# Patient Record
Sex: MALE | Race: WHITE | NOT HISPANIC OR LATINO | Employment: OTHER | URBAN - METROPOLITAN AREA
[De-identification: names, ages, dates, MRNs, and addresses within clinical notes are randomized per-mention and may not be internally consistent; named-entity substitution may affect disease eponyms.]

---

## 2017-01-16 ENCOUNTER — OFFICE VISIT (OUTPATIENT)
Dept: FAMILY MEDICINE CLINIC | Facility: CLINIC | Age: 77
End: 2017-01-16

## 2017-01-16 VITALS
RESPIRATION RATE: 20 BRPM | DIASTOLIC BLOOD PRESSURE: 70 MMHG | SYSTOLIC BLOOD PRESSURE: 130 MMHG | WEIGHT: 193 LBS | HEIGHT: 72 IN | TEMPERATURE: 97.1 F | HEART RATE: 72 BPM | BODY MASS INDEX: 26.14 KG/M2

## 2017-01-16 DIAGNOSIS — G89.29 CHRONIC MIDLINE LOW BACK PAIN WITH SCIATICA, SCIATICA LATERALITY UNSPECIFIED: Primary | ICD-10-CM

## 2017-01-16 DIAGNOSIS — D64.9 NORMOCYTIC ANEMIA: ICD-10-CM

## 2017-01-16 DIAGNOSIS — D69.6 THROMBOCYTOPENIA (HCC): Chronic | ICD-10-CM

## 2017-01-16 DIAGNOSIS — M15.9 PRIMARY OSTEOARTHRITIS INVOLVING MULTIPLE JOINTS: ICD-10-CM

## 2017-01-16 DIAGNOSIS — M54.40 CHRONIC MIDLINE LOW BACK PAIN WITH SCIATICA, SCIATICA LATERALITY UNSPECIFIED: Primary | ICD-10-CM

## 2017-01-16 DIAGNOSIS — R79.89 ELEVATED LFTS: ICD-10-CM

## 2017-01-16 LAB
ALBUMIN SERPL-MCNC: 4.4 G/DL (ref 3.2–4.8)
ALBUMIN/GLOB SERPL: 1.8 G/DL (ref 1.5–2.5)
ALP SERPL-CCNC: 77 U/L (ref 25–100)
ALT SERPL-CCNC: 27 U/L (ref 7–40)
AST SERPL-CCNC: 30 U/L (ref 0–33)
BASOPHILS # BLD AUTO: 0 10*3/MM3 (ref 0–0.2)
BASOPHILS NFR BLD AUTO: 0 % (ref 0–1)
BILIRUB SERPL-MCNC: 1.3 MG/DL (ref 0.3–1.2)
BUN SERPL-MCNC: 17 MG/DL (ref 9–23)
BUN/CREAT SERPL: 21.3 (ref 7–25)
CALCIUM SERPL-MCNC: 9.8 MG/DL (ref 8.7–10.4)
CHLORIDE SERPL-SCNC: 105 MMOL/L (ref 99–109)
CO2 SERPL-SCNC: 31 MMOL/L (ref 20–31)
CREAT SERPL-MCNC: 0.8 MG/DL (ref 0.6–1.3)
EOSINOPHIL # BLD AUTO: 0.13 10*3/MM3 (ref 0.1–0.3)
EOSINOPHIL NFR BLD AUTO: 2.2 % (ref 0–3)
ERYTHROCYTE [DISTWIDTH] IN BLOOD BY AUTOMATED COUNT: 14.2 % (ref 11.3–14.5)
GLOBULIN SER CALC-MCNC: 2.5 GM/DL
GLUCOSE SERPL-MCNC: 79 MG/DL (ref 70–100)
HCT VFR BLD AUTO: 42.4 % (ref 38.9–50.9)
HGB BLD-MCNC: 14.2 G/DL (ref 13.1–17.5)
IMM GRANULOCYTES # BLD: 0.01 10*3/MM3 (ref 0–0.03)
IMM GRANULOCYTES NFR BLD: 0.2 % (ref 0–0.6)
LYMPHOCYTES # BLD AUTO: 1.09 10*3/MM3 (ref 0.6–4.8)
LYMPHOCYTES NFR BLD AUTO: 18.9 % (ref 24–44)
MCH RBC QN AUTO: 30.3 PG (ref 27–31)
MCHC RBC AUTO-ENTMCNC: 33.5 G/DL (ref 32–36)
MCV RBC AUTO: 90.4 FL (ref 80–99)
MONOCYTES # BLD AUTO: 0.53 10*3/MM3 (ref 0–1)
MONOCYTES NFR BLD AUTO: 9.2 % (ref 0–12)
NEUTROPHILS # BLD AUTO: 4.02 10*3/MM3 (ref 1.5–8.3)
NEUTROPHILS NFR BLD AUTO: 69.5 % (ref 41–71)
PLATELET # BLD AUTO: 129 10*3/MM3 (ref 150–450)
POTASSIUM SERPL-SCNC: 4.3 MMOL/L (ref 3.5–5.5)
PROT SERPL-MCNC: 6.9 G/DL (ref 5.7–8.2)
RBC # BLD AUTO: 4.69 10*6/MM3 (ref 4.2–5.76)
SODIUM SERPL-SCNC: 146 MMOL/L (ref 132–146)
WBC # BLD AUTO: 5.78 10*3/MM3 (ref 3.5–10.8)

## 2017-01-16 PROCEDURE — 99214 OFFICE O/P EST MOD 30 MIN: CPT | Performed by: FAMILY MEDICINE

## 2017-01-16 RX ORDER — CELECOXIB 200 MG/1
CAPSULE ORAL
Refills: 0 | COMMUNITY
Start: 2016-12-07 | End: 2021-10-06 | Stop reason: SDUPTHER

## 2017-01-16 NOTE — MR AVS SNAPSHOT
Albin Andino   1/16/2017 11:30 AM   Office Visit    Dept Phone:  [phone]   Encounter #:  [id number]    Provider:  Matthew Dunaway MD   Department:  [facility]                Your Full Care Plan              Your Updated Medication List          This list is accurate as of: 1/16/17 12:16 PM.  Always use your most recent med list.                alfuzosin 10 MG 24 hr tablet   Commonly known as:  UROXATRAL       aspirin 81 MG chewable tablet       atorvastatin 20 MG tablet   Commonly known as:  LIPITOR       benazepril 10 MG tablet   Commonly known as:  LOTENSIN       celecoxib 200 MG capsule   Commonly known as:  CeleBREX       cyanocobalamin 500 MCG tablet   Commonly known as:  VITAMIN B-12       finasteride 5 MG tablet   Commonly known as:  PROSCAR       folic acid 400 MCG tablet   Commonly known as:  FOLVITE       gabapentin 600 MG tablet   Commonly known as:  NEURONTIN   1 po q am and 2 po q pm       ICAPS AREDS 2 capsule       isosorbide mononitrate 30 MG 24 hr tablet   Commonly known as:  IMDUR       ondansetron 4 MG tablet   Commonly known as:  ZOFRAN   Take 2 tablets by mouth every 8 (eight) hours as needed for nausea or vomiting.       vitamin B-6 100 MG tablet   Commonly known as:  PYRIDOXINE       Vitamin C 500 MG capsule       Vitamin D 1000 UNITS tablet               We Performed the Following     CBC & Differential     Comprehensive Metabolic Panel       You Were Diagnosed With        Codes Comments    Chronic midline low back pain with sciatica, sciatica laterality unspecified    -  Primary ICD-10-CM: M54.40, G89.29  ICD-9-CM: 724.2, 724.3, 338.29     Primary osteoarthritis involving multiple joints     ICD-10-CM: M15.0  ICD-9-CM: 715.09     Normocytic anemia     ICD-10-CM: D64.9  ICD-9-CM: 285.9     Thrombocytopenia     ICD-10-CM: D69.6  ICD-9-CM: 287.5     Elevated LFTs     ICD-10-CM: R79.89  ICD-9-CM: 790.6       Instructions     None    "   Patient Instructions History      Upcoming Appointments     Visit Type Date Time Department    FOLLOW UP 2017 11:30 AM [facility]    FOLLOW UP 7/10/2017 11:30 AM [facility]      [employer] Signup     [facility] allows you to send messages to your doctor, view your test results, renew your prescriptions, schedule appointments, and more. To sign up, go to [employer] and click on the Sign Up Now link in the New User? box. Enter your [employer] [facility] Activation Code exactly as it appears below along with the last four digits of your Social Security Number and your Date of Birth () to complete the sign-up process. If you do not sign up before the expiration date, you must request a new code.    [employer] [facility] Activation Code: [email or web address]  Expires: 2017 12:14 PM    If you have questions, you can email [email or web address] or call [phone] to talk to our [employer] [facility] staff. Remember, [employer] [facility] is NOT to be used for urgent needs. For medical emergencies, dial 911.               Other Info from Your Visit           Your Appointments     Jul 10, 2017 11:30 AM EDT   Follow Up with Matthew Dunaway MD   [facility] (--)    [address]  [place] [address] [phone]           Arrive 15 minutes prior to appointment.              Allergies     Hydrocodone Intolerance     Pt reports that it \"makes him go crazy\"    Lortab [Hydrocodone-acetaminophen]      Plavix [Clopidogrel Bisulfate]      thrombocytopenia      Reason for Visit     Follow-up     Hypertension     Hyperlipidemia     thrombocytopenia     Osteoarthritis     Back Pain     Restless Legs Syndrome           Vital Signs     Blood Pressure Pulse Temperature Respirations Height Weight    130/70 72 97.1 °F (36.2 °C) 20 72\" (182.9 cm) 193 lb (87.5 kg)    Body Mass Index Smoking Status                26.18 kg/m2 Former Smoker          Problems and Diagnoses Noted     Decreased " platelet count    Elevated LFTs    High blood pressure    Low back pain    Mixed hyperlipidemia    Normocytic anemia    Osteoarthritis (arthritis due to wear and tear of joints)

## 2017-01-16 NOTE — PROGRESS NOTES
Subjective    FU HTN, HLP, thrombocytopenia, OA, RLS & low back pain.   NOTE: pt is NOT fasting today.  Pt does not need any RF's today.    Albin Andino is a 76 y.o. male.     History of Present Illness     Cardiology - Dr. Mullins gave Celebrex and has been helpful.    DJD - generally stable.  Has trouble with knees, low back, hands; of note patient had been seeing Dr. Livingston for his back and knees.  Unfortunately Dr. Livingston no longer takes his insurance and will need to find another orthopedic provider in the future.    No CP-no shortness of breath.  Cardiovascularly has been stable now for a number of years.  Blood pressure has been more regulated.  He had some hypotensive episodes over the last year but this has not been a problem for the last several months.    [facility] work done.  Last labs look like he had done in September and had an elevation in his transaminases.  There is no notation on the labs in terms of follow-up that they requested/desired.  He is unaware that his liver function tests were abnormal and was not informed of such.    He also has a history of a normocytic anemia that needs follow-up.    The following portions of the patient's history were reviewed and updated as appropriate: allergies, current medications, past medical history, past social history and problem list.    Review of Systems   Constitutional: Negative.  Negative for unexpected weight change.   HENT: Negative.    Respiratory: Negative.  Negative for cough and shortness of breath.    Cardiovascular: Negative.  Negative for chest pain and leg swelling.   Gastrointestinal: Negative.    Musculoskeletal: Positive for arthralgias (Generally stable to improved with the Celebrex) and back pain. Negative for joint swelling and myalgias.   Skin: Negative.  Negative for rash.   Neurological: Negative.  Negative for dizziness and headaches.   Psychiatric/Behavioral:  Negative.        Objective   Physical Exam   Constitutional: He is oriented to person, place, and time. He appears well-developed and well-nourished.   HENT:   Head: Normocephalic.   Eyes: Pupils are equal, round, and reactive to light.   Neck: No JVD present. Carotid bruit is not present.   Cardiovascular: Normal rate, regular rhythm and normal heart sounds.    Pulmonary/Chest: Effort normal and breath sounds normal. He has no wheezes.   Musculoskeletal: He exhibits no edema.   Lymphadenopathy:     He has no cervical adenopathy.   Neurological: He is alert and oriented to person, place, and time.   Skin: Skin is warm and dry.   Psychiatric: He has a normal mood and affect. His behavior is normal.   Nursing note and vitals reviewed.      Assessment/Plan   Albin was seen today for follow-up, hypertension, hyperlipidemia, thrombocytopenia, osteoarthritis, back pain and restless legs syndrome.    Diagnoses and all orders for this visit:    Chronic midline low back pain with sciatica, sciatica laterality unspecified    Primary osteoarthritis involving multiple joints    Normocytic anemia  -     CBC & Differential    Acute on Chronic Thrombocytopenia  -     CBC & Differential    Elevated LFTs, with hyperbilirubinemia, due to above   -     Comprehensive Metabolic Panel    Generally doing well.  Recommended repeating his CBC and CMP with particular attention to his liver function tests.  Had normal LFTs status post cholecystectomy this past summer.  Further evaluation dependent upon lab findings.    Continue current medications as before.  Keep routine follow-up with [facility] and [facility].  Call with questions in the interim.    Pneumovax/Prevnar at next visit.

## 2017-07-10 ENCOUNTER — OFFICE VISIT (OUTPATIENT)
Dept: FAMILY MEDICINE CLINIC | Facility: CLINIC | Age: 77
End: 2017-07-10

## 2017-07-10 VITALS
HEIGHT: 72 IN | SYSTOLIC BLOOD PRESSURE: 110 MMHG | TEMPERATURE: 97.7 F | HEART RATE: 58 BPM | DIASTOLIC BLOOD PRESSURE: 62 MMHG | WEIGHT: 192 LBS | RESPIRATION RATE: 20 BRPM | BODY MASS INDEX: 26.01 KG/M2

## 2017-07-10 DIAGNOSIS — I25.10 CORONARY ARTERY DISEASE INVOLVING NATIVE CORONARY ARTERY OF NATIVE HEART WITHOUT ANGINA PECTORIS: ICD-10-CM

## 2017-07-10 DIAGNOSIS — I10 ESSENTIAL HYPERTENSION: Primary | ICD-10-CM

## 2017-07-10 DIAGNOSIS — M15.9 PRIMARY OSTEOARTHRITIS INVOLVING MULTIPLE JOINTS: ICD-10-CM

## 2017-07-10 DIAGNOSIS — D69.6 THROMBOCYTOPENIA (HCC): ICD-10-CM

## 2017-07-10 DIAGNOSIS — E78.2 MIXED HYPERLIPIDEMIA: ICD-10-CM

## 2017-07-10 DIAGNOSIS — D64.9 NORMOCYTIC ANEMIA: ICD-10-CM

## 2017-07-10 DIAGNOSIS — G25.81 RESTLESS LEGS SYNDROME: ICD-10-CM

## 2017-07-10 LAB
ALBUMIN SERPL-MCNC: 4.2 G/DL (ref 3.2–4.8)
ALBUMIN/GLOB SERPL: 1.9 G/DL (ref 1.5–2.5)
ALP SERPL-CCNC: 79 U/L (ref 25–100)
ALT SERPL-CCNC: 22 U/L (ref 7–40)
AST SERPL-CCNC: 26 U/L (ref 0–33)
BASOPHILS # BLD AUTO: 0 10*3/MM3 (ref 0–0.2)
BASOPHILS NFR BLD AUTO: 0 % (ref 0–1)
BILIRUB SERPL-MCNC: 0.9 MG/DL (ref 0.3–1.2)
BUN SERPL-MCNC: 19 MG/DL (ref 9–23)
BUN/CREAT SERPL: 23.8 (ref 7–25)
CALCIUM SERPL-MCNC: 9.9 MG/DL (ref 8.7–10.4)
CHLORIDE SERPL-SCNC: 106 MMOL/L (ref 99–109)
CHOLEST SERPL-MCNC: 127 MG/DL (ref 0–200)
CO2 SERPL-SCNC: 28 MMOL/L (ref 20–31)
CREAT SERPL-MCNC: 0.8 MG/DL (ref 0.6–1.3)
EOSINOPHIL # BLD AUTO: 0.09 10*3/MM3 (ref 0–0.3)
EOSINOPHIL NFR BLD AUTO: 1.8 % (ref 0–3)
ERYTHROCYTE [DISTWIDTH] IN BLOOD BY AUTOMATED COUNT: 13.6 % (ref 11.3–14.5)
GLOBULIN SER CALC-MCNC: 2.2 GM/DL
GLUCOSE SERPL-MCNC: 84 MG/DL (ref 70–100)
HCT VFR BLD AUTO: 41.4 % (ref 38.9–50.9)
HDLC SERPL-MCNC: 34 MG/DL (ref 40–60)
HGB BLD-MCNC: 14 G/DL (ref 13.1–17.5)
IMM GRANULOCYTES # BLD: 0 10*3/MM3 (ref 0–0.03)
IMM GRANULOCYTES NFR BLD: 0 % (ref 0–0.6)
IRON SERPL-MCNC: 125 MCG/DL (ref 50–175)
LDLC SERPL CALC-MCNC: 68 MG/DL (ref 0–100)
LDLC/HDLC SERPL: 1.99 {RATIO}
LYMPHOCYTES # BLD AUTO: 0.99 10*3/MM3 (ref 0.6–4.8)
LYMPHOCYTES NFR BLD AUTO: 19.4 % (ref 24–44)
MCH RBC QN AUTO: 30.8 PG (ref 27–31)
MCHC RBC AUTO-ENTMCNC: 33.8 G/DL (ref 32–36)
MCV RBC AUTO: 91 FL (ref 80–99)
MONOCYTES # BLD AUTO: 0.42 10*3/MM3 (ref 0–1)
MONOCYTES NFR BLD AUTO: 8.3 % (ref 0–12)
NEUTROPHILS # BLD AUTO: 3.59 10*3/MM3 (ref 1.5–8.3)
NEUTROPHILS NFR BLD AUTO: 70.5 % (ref 41–71)
PLATELET # BLD AUTO: 130 10*3/MM3 (ref 150–450)
POTASSIUM SERPL-SCNC: 4.3 MMOL/L (ref 3.5–5.5)
PROT SERPL-MCNC: 6.4 G/DL (ref 5.7–8.2)
RBC # BLD AUTO: 4.55 10*6/MM3 (ref 4.2–5.76)
SODIUM SERPL-SCNC: 142 MMOL/L (ref 132–146)
TRIGL SERPL-MCNC: 126 MG/DL (ref 0–150)
VIT B12 SERPL-MCNC: 699 PG/ML (ref 211–911)
VLDLC SERPL CALC-MCNC: 25.2 MG/DL
WBC # BLD AUTO: 5.09 10*3/MM3 (ref 3.5–10.8)

## 2017-07-10 PROCEDURE — 99214 OFFICE O/P EST MOD 30 MIN: CPT | Performed by: FAMILY MEDICINE

## 2017-07-10 RX ORDER — GABAPENTIN 600 MG/1
TABLET ORAL
Qty: 270 TABLET | Refills: 3 | Status: SHIPPED | OUTPATIENT
Start: 2017-07-10 | End: 2017-07-10 | Stop reason: SDUPTHER

## 2017-07-10 RX ORDER — GABAPENTIN 600 MG/1
TABLET ORAL
Qty: 270 TABLET | Refills: 3 | Status: SHIPPED | OUTPATIENT
Start: 2017-07-10 | End: 2018-01-08 | Stop reason: SDUPTHER

## 2017-07-10 NOTE — PROGRESS NOTES
Subjective   Albin Andino is a 77 y.o. male.     History of Present Illness     Patient is here today for routine follow-up of hypertension, hyperlipidemia and CAD and chronic thrombus cytopenia.  Also has had history of some normocytic anemia and restless leg syndrome.    Dr. Mullins - is his cardiologist.  Sees in the next week or 2.    Bothered by chronic restless leg syndrome which she uses gabapentin 4 periods been quite helpful over the years.    History of chronic thrombus cytopenia.  Generally not had a platelet count of less than 100,000.    Chest pain or shortness of breath.  Primarily limited by some osteoarthritis pain and on bad days he uses Celebrex that his cardiologist gave him to use on a when necessary basis.    Due for some lab work today.    Replaced knee on the right last year.  Doing well.  No scheduled follow-up with orthopedics for this.    The following portions of the patient's history were reviewed and updated as appropriate: allergies, current medications, past medical history, past social history and problem list.    Review of Systems   Constitutional: Negative.  Negative for unexpected weight change.   HENT: Negative.    Respiratory: Negative.  Negative for cough and shortness of breath.    Cardiovascular: Negative.  Negative for chest pain and leg swelling.   Gastrointestinal: Negative.    Musculoskeletal: Positive for arthralgias (Generally stable - prn Celebrex usage) and back pain. Negative for joint swelling and myalgias.   Skin: Negative.  Negative for rash.   Neurological: Negative.  Negative for dizziness and headaches.   Psychiatric/Behavioral: Negative.        Objective   Physical Exam   Constitutional: He is oriented to person, place, and time. He appears well-developed and well-nourished.   HENT:   Head: Normocephalic.   Eyes: Pupils are equal, round, and reactive to light.   Neck: No JVD present. Carotid bruit is not present.   Cardiovascular: Normal rate, regular rhythm  and normal heart sounds.    Pulmonary/Chest: Effort normal and breath sounds normal. He has no wheezes.   Musculoskeletal: He exhibits no edema.   Lymphadenopathy:     He has no cervical adenopathy.   Neurological: He is alert and oriented to person, place, and time.   Skin: Skin is warm and dry.   Psychiatric: He has a normal mood and affect. His behavior is normal.   Nursing note and vitals reviewed.      Assessment/Plan   Albin was seen today for hypertension.    Diagnoses and all orders for this visit:    Essential hypertension  -     Comprehensive Metabolic Panel    Mixed hyperlipidemia  -     Comprehensive Metabolic Panel  -     Lipid Panel With LDL / HDL Ratio    Coronary artery disease involving native coronary artery of native heart without angina pectoris  -     Lipid Panel With LDL / HDL Ratio    Primary osteoarthritis involving multiple joints    Thrombocytopenia  -     CBC & Differential    Restless legs syndrome  Comments:  We'll check iron level along with continuing his gabapentin.  Orders:  -     Iron  -     Discontinue: gabapentin (NEURONTIN) 600 MG tablet; 1 po q am and 2 po q pm  -     gabapentin (NEURONTIN) 600 MG tablet; 1 po q am and 2 po q pm    Normocytic anemia  -     CBC & Differential  -     Iron  -     Vitamin B12    Labs to Dr. Mullins will see him next week; [facility]  I'll continue to prescribe the gabapentin and refilled today.  Recheck with me in 6 months or when necessary.

## 2017-07-11 NOTE — PROGRESS NOTES
Spoke with pt and went over results/instructions. Verbalized understanding. Labs faxed to Dr Mullins and informed pt of same.

## 2018-01-08 DIAGNOSIS — G25.81 RESTLESS LEGS SYNDROME: ICD-10-CM

## 2018-01-08 RX ORDER — GABAPENTIN 600 MG/1
TABLET ORAL
Qty: 270 TABLET | Refills: 1 | OUTPATIENT
Start: 2018-01-08 | End: 2018-08-24 | Stop reason: SDUPTHER

## 2018-01-08 RX ORDER — GABAPENTIN 600 MG/1
TABLET ORAL
Qty: 270 TABLET | Refills: 3 | Status: SHIPPED | OUTPATIENT
Start: 2018-01-08 | End: 2018-08-24

## 2018-02-04 DIAGNOSIS — K05.00 GINGIVITIS, ACUTE: Primary | ICD-10-CM

## 2018-02-04 RX ORDER — AMOXICILLIN 500 MG/1
1000 CAPSULE ORAL 2 TIMES DAILY
Qty: 28 CAPSULE | Refills: 0 | Status: SHIPPED | OUTPATIENT
Start: 2018-02-04 | End: 2018-10-01

## 2018-02-04 NOTE — PROGRESS NOTES
Patient called on call with recurrent acute gingivitis. Has had before. Called dentist on Friday - they are out until this next week. He normally starts patient on Amoxicillin until he can be seen. I sent in Amox 1000 mg bid for 7 days and urged him to follow through with seeing the dentist.

## 2018-04-16 RX ORDER — OSELTAMIVIR PHOSPHATE 75 MG/1
75 CAPSULE ORAL DAILY
Qty: 10 CAPSULE | Refills: 0 | Status: SHIPPED | OUTPATIENT
Start: 2018-04-16 | End: 2018-10-01

## 2018-08-24 DIAGNOSIS — G25.81 RESTLESS LEGS SYNDROME: ICD-10-CM

## 2018-08-24 RX ORDER — GABAPENTIN 600 MG/1
TABLET ORAL
Qty: 270 TABLET | Refills: 1 | Status: SHIPPED | OUTPATIENT
Start: 2018-08-24 | End: 2019-05-20 | Stop reason: SDUPTHER

## 2018-10-01 ENCOUNTER — OFFICE VISIT (OUTPATIENT)
Dept: FAMILY MEDICINE CLINIC | Facility: CLINIC | Age: 78
End: 2018-10-01

## 2018-10-01 VITALS
RESPIRATION RATE: 16 BRPM | BODY MASS INDEX: 25.52 KG/M2 | HEIGHT: 72 IN | DIASTOLIC BLOOD PRESSURE: 56 MMHG | HEART RATE: 56 BPM | SYSTOLIC BLOOD PRESSURE: 95 MMHG | WEIGHT: 188.4 LBS | TEMPERATURE: 97.6 F

## 2018-10-01 DIAGNOSIS — G25.81 RESTLESS LEGS SYNDROME: ICD-10-CM

## 2018-10-01 DIAGNOSIS — D64.9 NORMOCYTIC ANEMIA: ICD-10-CM

## 2018-10-01 DIAGNOSIS — D69.6 THROMBOCYTOPENIA (HCC): ICD-10-CM

## 2018-10-01 DIAGNOSIS — E78.2 MIXED HYPERLIPIDEMIA: ICD-10-CM

## 2018-10-01 DIAGNOSIS — I10 ESSENTIAL HYPERTENSION: Primary | ICD-10-CM

## 2018-10-01 DIAGNOSIS — M15.9 PRIMARY OSTEOARTHRITIS INVOLVING MULTIPLE JOINTS: ICD-10-CM

## 2018-10-01 DIAGNOSIS — I48.0 PAROXYSMAL ATRIAL FIBRILLATION (HCC): ICD-10-CM

## 2018-10-01 DIAGNOSIS — Z23 ENCOUNTER FOR IMMUNIZATION: ICD-10-CM

## 2018-10-01 PROCEDURE — 99214 OFFICE O/P EST MOD 30 MIN: CPT | Performed by: FAMILY MEDICINE

## 2018-10-01 PROCEDURE — G0009 ADMIN PNEUMOCOCCAL VACCINE: HCPCS | Performed by: FAMILY MEDICINE

## 2018-10-01 PROCEDURE — 90670 PCV13 VACCINE IM: CPT | Performed by: FAMILY MEDICINE

## 2018-10-01 RX ORDER — LORATADINE 10 MG/1
10 TABLET ORAL DAILY
Refills: 0 | COMMUNITY
Start: 2018-09-14

## 2018-10-01 RX ORDER — IPRATROPIUM BROMIDE 21 UG/1
SPRAY, METERED NASAL
Refills: 0 | COMMUNITY
Start: 2018-09-14

## 2018-10-06 ENCOUNTER — DOCUMENTATION (OUTPATIENT)
Dept: FAMILY MEDICINE CLINIC | Facility: CLINIC | Age: 78
End: 2018-10-06

## 2018-10-06 RX ORDER — BENAZEPRIL HYDROCHLORIDE 5 MG/1
5 TABLET, FILM COATED ORAL DAILY
Qty: 30 TABLET | Refills: 5 | Status: SHIPPED | OUTPATIENT
Start: 2018-10-06 | End: 2019-04-04

## 2019-04-08 DIAGNOSIS — N41.0 ACUTE PROSTATITIS: Primary | ICD-10-CM

## 2019-04-08 DIAGNOSIS — N40.0 PROSTATIC ENLARGEMENT: ICD-10-CM

## 2019-05-20 DIAGNOSIS — G25.81 RESTLESS LEGS SYNDROME: ICD-10-CM

## 2019-05-20 RX ORDER — GABAPENTIN 600 MG/1
TABLET ORAL
Qty: 270 TABLET | Refills: 1 | Status: SHIPPED | OUTPATIENT
Start: 2019-05-20 | End: 2019-10-14 | Stop reason: SDUPTHER

## 2019-10-14 DIAGNOSIS — G25.81 RESTLESS LEGS SYNDROME: ICD-10-CM

## 2019-10-14 RX ORDER — GABAPENTIN 600 MG/1
TABLET ORAL
Qty: 270 TABLET | Refills: 1 | Status: SHIPPED | OUTPATIENT
Start: 2019-10-14 | End: 2020-03-02 | Stop reason: SDUPTHER

## 2020-03-02 DIAGNOSIS — G25.81 RESTLESS LEGS SYNDROME: ICD-10-CM

## 2020-03-02 RX ORDER — GABAPENTIN 600 MG/1
TABLET ORAL
Qty: 270 TABLET | Refills: 1 | Status: SHIPPED | OUTPATIENT
Start: 2020-03-02 | End: 2020-11-29 | Stop reason: SDUPTHER

## 2020-06-05 DIAGNOSIS — M54.40 CHRONIC MIDLINE LOW BACK PAIN WITH SCIATICA, SCIATICA LATERALITY UNSPECIFIED: Primary | ICD-10-CM

## 2020-06-05 DIAGNOSIS — G89.29 CHRONIC MIDLINE LOW BACK PAIN WITH SCIATICA, SCIATICA LATERALITY UNSPECIFIED: Primary | ICD-10-CM

## 2020-06-05 RX ORDER — TRAMADOL HYDROCHLORIDE 50 MG/1
50 TABLET ORAL EVERY 6 HOURS PRN
Qty: 60 TABLET | Refills: 0 | Status: SHIPPED | OUTPATIENT
Start: 2020-06-05

## 2020-06-05 NOTE — PROGRESS NOTES
Patient with a long history of low back pain.  Acute flare over the last couple of days. Severe pain and leg symptoms.No trauma or fall. No B/B dysfunction. Has seen Ortho in the past and had surgery. Called Dr. Livingston's office today (ortho) and they will see him Monday but refused to call him in any medication until he is seen. Has done well with tramadol in the past and I sent that in to his pharmacy in the interim. Has an appt with me 6/15. Ice, rest and to the ED if worsens or B/B problems

## 2020-06-12 NOTE — PROGRESS NOTES
Subjective   Albin Andino is a 79 y.o. male.   Chief Complaint   Patient presents with   • Follow-up     NOTE: pt is NOT fasting today    • Hypertension   • Atrial Fibrillation   • Osteoarthritis   • thrombocytopenia   • Hyperlipidemia   • Restless Legs Syndrome     History of Present Illness     Patient returns today for follow-up of chronic medical conditions as noted above.      It is been about a year and a half since I last saw Albin in the office.  [facility].    He has been battling some acute on chronic back pain recently.  Actually had an MRI scheduled for today.  Has seen Dr. Livingston's office.  Hoping that they can do some kind of injection therapy to help. Appt this Thursday to recheck.    He has had right leg numbness with this.  No change in his bowel or bladder function.    [facility] as well.  Blood work that I have a copy of his from about 18 months ago.  LDL was 68 mg/dL.  Creatinine was 0.8 with a GFR estimated at greater than 60.  Normal LFTs.    He gets essentially all of [facility] now with the exception of gabapentin.  I continue to fill that for him.  I also gave him some tramadol recently with a flare of his back.  He has had trouble tolerating narcotics in the past but does okay with tramadol. Helps some.    In general, over the last several weeks besides his back he has been feeling - worse. See above.     Cardiology has been following him for his atrial fibrillation.  Next visit with them is next week.    The following portions of the patient's history were reviewed and updated as appropriate: allergies, current medications, past medical history, past social history and problem list.    Review of Systems   Constitutional: Negative for unexpected weight loss.   HENT: Negative.    Eyes: Negative for blurred vision and visual disturbance.   Respiratory: Negative.     Cardiovascular: Negative.  Negative for chest pain and leg swelling.   Gastrointestinal: Negative.    Endocrine: Negative for polyuria.   Genitourinary: Positive for nocturia. Negative for hematuria.   Musculoskeletal: Positive for arthralgias (knees) and back pain (stable).   Skin: Negative.    Neurological: Positive for numbness (right leg). Negative for dizziness and headache.   Psychiatric/Behavioral: Negative.  Negative for sleep disturbance.       Objective   Physical Exam   Constitutional: He is oriented to person, place, and time. He appears well-developed and well-nourished. No distress.   Eyes: No scleral icterus.   Neck: Neck supple. Carotid bruit is not present.   Cardiovascular: Normal rate and regular rhythm.   Pulmonary/Chest: Effort normal and breath sounds normal.   Musculoskeletal: He exhibits no edema.   Lymphadenopathy:     He has no cervical adenopathy.   Neurological: He is alert and oriented to person, place, and time.   Skin: Skin is warm and dry.   Psychiatric: He has a normal mood and affect. Thought content normal.   Nursing note and vitals reviewed.        Assessment/Plan   Albin was seen today for follow-up, hypertension, atrial fibrillation, osteoarthritis, thrombocytopenia, hyperlipidemia and restless legs syndrome.    Diagnoses and all orders for this visit:    Essential hypertension  Comments:  Able-no changes continue [facility]  Orders:  -     Comprehensive Metabolic Panel  -     CBC & Differential    Mixed hyperlipidemia  -     Lipid Panel With LDL / HDL Ratio    Paroxysmal atrial fibrillation (CMS/HCC)  Comments:  Cardiology following    Thrombocytopenia (CMS/HCC)  Comments:  Long history of.  Continue to monitor annually  Orders:  -     CBC & Differential    Restless legs syndrome  -     Iron    Chronic midline low back pain with sciatica, sciatica laterality unspecified  Comments:  In the midst of a subacute flare.  Follow Dr. Livingston's office direction and  hope the MRI will show us and potential treatment options for his pain    Advance care planning  -     Ambulatory Referral to Advance Care Planning    Abnormal finding of blood chemistry, unspecified   -     Iron    Advance Care Planning - referral made  Copy to Dr. Susanna Dunaway MD  06/15/2020

## 2020-06-15 ENCOUNTER — OFFICE VISIT (OUTPATIENT)
Dept: FAMILY MEDICINE CLINIC | Facility: CLINIC | Age: 80
End: 2020-06-15

## 2020-06-15 VITALS
DIASTOLIC BLOOD PRESSURE: 65 MMHG | SYSTOLIC BLOOD PRESSURE: 118 MMHG | BODY MASS INDEX: 24.79 KG/M2 | HEART RATE: 84 BPM | HEIGHT: 72 IN | WEIGHT: 183 LBS | RESPIRATION RATE: 20 BRPM | TEMPERATURE: 98.2 F

## 2020-06-15 DIAGNOSIS — G89.29 CHRONIC MIDLINE LOW BACK PAIN WITH SCIATICA, SCIATICA LATERALITY UNSPECIFIED: ICD-10-CM

## 2020-06-15 DIAGNOSIS — I48.0 PAROXYSMAL ATRIAL FIBRILLATION (HCC): ICD-10-CM

## 2020-06-15 DIAGNOSIS — D69.6 THROMBOCYTOPENIA (HCC): ICD-10-CM

## 2020-06-15 DIAGNOSIS — E78.2 MIXED HYPERLIPIDEMIA: ICD-10-CM

## 2020-06-15 DIAGNOSIS — Z71.89 ADVANCE CARE PLANNING: ICD-10-CM

## 2020-06-15 DIAGNOSIS — R79.9 ABNORMAL FINDING OF BLOOD CHEMISTRY, UNSPECIFIED: ICD-10-CM

## 2020-06-15 DIAGNOSIS — M54.40 CHRONIC MIDLINE LOW BACK PAIN WITH SCIATICA, SCIATICA LATERALITY UNSPECIFIED: ICD-10-CM

## 2020-06-15 DIAGNOSIS — G25.81 RESTLESS LEGS SYNDROME: ICD-10-CM

## 2020-06-15 DIAGNOSIS — I10 ESSENTIAL HYPERTENSION: Primary | ICD-10-CM

## 2020-06-15 LAB
ALBUMIN SERPL-MCNC: 4.8 G/DL (ref 3.5–5.2)
ALBUMIN/GLOB SERPL: 2.7 G/DL
ALP SERPL-CCNC: 69 U/L (ref 39–117)
ALT SERPL-CCNC: 19 U/L (ref 1–41)
AST SERPL-CCNC: 18 U/L (ref 1–40)
BASOPHILS # BLD AUTO: 0.01 10*3/MM3 (ref 0–0.2)
BASOPHILS NFR BLD AUTO: 0.2 % (ref 0–1.5)
BILIRUB SERPL-MCNC: 0.8 MG/DL (ref 0.2–1.2)
BUN SERPL-MCNC: 16 MG/DL (ref 8–23)
BUN/CREAT SERPL: 16.2 (ref 7–25)
CALCIUM SERPL-MCNC: 9.6 MG/DL (ref 8.6–10.5)
CHLORIDE SERPL-SCNC: 103 MMOL/L (ref 98–107)
CHOLEST SERPL-MCNC: 138 MG/DL (ref 0–200)
CO2 SERPL-SCNC: 29.7 MMOL/L (ref 22–29)
CREAT SERPL-MCNC: 0.99 MG/DL (ref 0.76–1.27)
EOSINOPHIL # BLD AUTO: 0.07 10*3/MM3 (ref 0–0.4)
EOSINOPHIL NFR BLD AUTO: 1.1 % (ref 0.3–6.2)
ERYTHROCYTE [DISTWIDTH] IN BLOOD BY AUTOMATED COUNT: 12.8 % (ref 12.3–15.4)
GLOBULIN SER CALC-MCNC: 1.8 GM/DL
GLUCOSE SERPL-MCNC: 124 MG/DL (ref 65–99)
HCT VFR BLD AUTO: 42.4 % (ref 37.5–51)
HDLC SERPL-MCNC: 41 MG/DL (ref 40–60)
HGB BLD-MCNC: 14.9 G/DL (ref 13–17.7)
IMM GRANULOCYTES # BLD AUTO: 0.02 10*3/MM3 (ref 0–0.05)
IMM GRANULOCYTES NFR BLD AUTO: 0.3 % (ref 0–0.5)
IRON SERPL-MCNC: 87 MCG/DL (ref 59–158)
LDLC SERPL CALC-MCNC: 73 MG/DL (ref 0–100)
LDLC/HDLC SERPL: 1.78 {RATIO}
LYMPHOCYTES # BLD AUTO: 0.8 10*3/MM3 (ref 0.7–3.1)
LYMPHOCYTES NFR BLD AUTO: 12.3 % (ref 19.6–45.3)
MCH RBC QN AUTO: 32.3 PG (ref 26.6–33)
MCHC RBC AUTO-ENTMCNC: 35.1 G/DL (ref 31.5–35.7)
MCV RBC AUTO: 92 FL (ref 79–97)
MONOCYTES # BLD AUTO: 0.4 10*3/MM3 (ref 0.1–0.9)
MONOCYTES NFR BLD AUTO: 6.2 % (ref 5–12)
NEUTROPHILS # BLD AUTO: 5.19 10*3/MM3 (ref 1.7–7)
NEUTROPHILS NFR BLD AUTO: 79.9 % (ref 42.7–76)
NRBC BLD AUTO-RTO: 0 /100 WBC (ref 0–0.2)
PLATELET # BLD AUTO: 129 10*3/MM3 (ref 140–450)
POTASSIUM SERPL-SCNC: 3.9 MMOL/L (ref 3.5–5.2)
PROT SERPL-MCNC: 6.6 G/DL (ref 6–8.5)
RBC # BLD AUTO: 4.61 10*6/MM3 (ref 4.14–5.8)
SODIUM SERPL-SCNC: 141 MMOL/L (ref 136–145)
TRIGL SERPL-MCNC: 121 MG/DL (ref 0–150)
VLDLC SERPL CALC-MCNC: 24.2 MG/DL
WBC # BLD AUTO: 6.49 10*3/MM3 (ref 3.4–10.8)

## 2020-06-15 PROCEDURE — 99214 OFFICE O/P EST MOD 30 MIN: CPT | Performed by: FAMILY MEDICINE

## 2020-06-15 NOTE — PATIENT INSTRUCTIONS
Advance Care Planning and Advance Directives     You make decisions on a daily basis - decisions about where you want to live, your career, your home, your life. Perhaps one of the most important decisions you face is your choice for future medical care. Take time to talk with your family and your healthcare team and start planning today.  Advance Care Planning is a process that can help you:  · Understand possible future healthcare decisions in light of your own experiences  · Reflect on those decision in light of your goals and values  · Discuss your decisions with those closest to you and the healthcare professionals that care for you  · Make a plan by creating a document that reflects your wishes    Surrogate Decision Maker  In the event of a medical emergency, which has left you unable to communicate or to make your own decisions, you would need someone to make decisions for you.  It is important to discuss your preferences for medical treatment with this person while you are in good health.     Qualities of a surrogate decision maker:  • Willing to take on this role and responsibility  • Knows what you want for future medical care  • Willing to follow your wishes even if they don't agree with them  • Able to make difficult medical decisions under stressful circumstances    Advance Directives  These are legal documents you can create that will guide your healthcare team and decision maker(s) when needed. These documents can be stored in the electronic medical record.    · Living Will - a legal document to guide your care if you have a terminal condition or a serious illness and are unable to communicate. States vary by statute in document names/types, but most forms may include one or more of the following:        -  Directions regarding life-prolonging treatments        -  Directions regarding artificially provided nutrition/hydration        -  Choosing a healthcare decision maker        -  Direction  regarding organ/tissue donation    · Durable Power of  for Healthcare - this document names an -in-fact to make medical decisions for you, but it may also allow this person to make personal and financial decisions for you. Please seek the advice of an  if you need this type of document.    **Advance Directives are not required and no one may discriminate against you if you do not sign one.    Medical Orders  Many states allow specific forms/orders signed by your physician to record your wishes for medical treatment in your current state of health. This form, signed in personal communication with your physician, addresses resuscitation and other medical interventions that you may or may not want.      For more information or to schedule a time with a [facility] Advance Care Planning Facilitator contact: [email or web address] or call [phone] and someone will contact you directly.

## 2020-11-29 DIAGNOSIS — G25.81 RESTLESS LEGS SYNDROME: ICD-10-CM

## 2020-11-29 RX ORDER — GABAPENTIN 600 MG/1
TABLET ORAL
Qty: 270 TABLET | Refills: 1 | Status: SHIPPED | OUTPATIENT
Start: 2020-11-29 | End: 2021-08-19 | Stop reason: SDUPTHER

## 2021-08-16 NOTE — ASSESSMENT & PLAN NOTE
[facility]ues [facility].  We will check his renal function today.  No stomach symptoms.

## 2021-08-16 NOTE — PROGRESS NOTES
Chief Complaint  Follow-up, Hypertension, Hyperlipidemia, Atrial Fibrillation, Thrombocytopenia, Osteoarthritis, and chronic low back pain  Patient returns today for follow-up of chronic medical conditions as noted above.       Subjective          History of Present Illness  Albin Andino presents to [facility] for Follow-up, Hypertension, Hyperlipidemia, Atrial Fibrillation, Thrombocytopenia, Osteoarthritis, and chronic low back pain    I last saw Albin over a year ago.  [facility] and they monitor some of his lab work.  We do not have good information exchange with the [facility].  I have received no notes or lab work over the last year from the [facility].    His last lab work with me in June 2020 showed that his kidney and liver function looked okay.  His blood counts were okay and his generally lower platelets were stable.  His iron was normal and his overall cholesterol was good.  His HDL even showed a positive change from the previous visit.    He is continued to be bothered by arthritis which limits some of his mobility particularly in his knees.  He is status post TJR in the past.    The patient reports that his benazepril has been decreased to 5 mg. He reports that he is scheduled to [facility]21. He states that he has been having difficulty remembering to take his gabapentin in the morning; however, it has been working well and he is rarely waking up during the night. He reports that he has been having some difficulty remembering things.     He denies having any dizziness with the heat last week. He states that he will become dizzy and lightheaded if he does not drink a significant amount of water. He denies having difficulty falling asleep at night or having a living will.    Objective   Vital Signs:   /60   Pulse 67   Temp 97.5 °F (36.4 °C)   Resp 20   Ht 182.9 cm  "(72\")   Wt 84.4 kg (186 lb)   SpO2 97%   BMI 25.23 kg/m²     Physical Exam  Vitals and nursing note reviewed.   Constitutional:       General: He is not in acute distress.     Appearance: He is well-developed.   Eyes:      General: No scleral icterus.  Neck:      Vascular: No carotid bruit.   Cardiovascular:      Rate and Rhythm: Normal rate and regular rhythm.   Pulmonary:      Effort: Pulmonary effort is normal.      Breath sounds: Normal breath sounds.   Musculoskeletal:      Cervical back: Neck supple.      Right lower leg: No edema.      Left lower leg: No edema.      Comments: A bit stooped in his gait but ambulates without assistance or problem.   Lymphadenopathy:      Cervical: No cervical adenopathy.   Skin:     General: Skin is warm and dry.   Neurological:      Mental Status: He is alert.      Sensory: No sensory deficit.   Psychiatric:         Mood and Affect: Mood normal.         Thought Content: Thought content normal.        Result Review :                 Assessment and Plan    Diagnoses and all orders for this visit:    1. Essential hypertension (Primary)  Assessment & Plan:  Adequate control.  No change in his benazepril 2.5 mg a day.    Orders:  -     Comprehensive Metabolic Panel    2. Mixed hyperlipidemia  Assessment & Plan:  Check an NMR and apolipoprotein B be given his previous coronary disease to further risk stratify him.  Currently on 20 mg of atorvastatin.    Orders:  -     NMR LipoProfile  -     Apolipoprotein B  -     Comprehensive Metabolic Panel    3. Paroxysmal atrial fibrillation (CMS/HCC)  Comments:  Continues to see cardiology on a regular basis.  Encouraged that follow-up.    4. Primary osteoarthritis involving multiple joints  Assessment & Plan:  Continues [facility] Cele[facility].  We will check his renal function today.  No stomach symptoms.      5. Restless legs syndrome  Assessment & Plan:  Check CMP and continue his gabapentin as before.  Has been quite " helpful.    Orders:  -     Comprehensive Metabolic Panel  -     gabapentin (NEURONTIN) 600 MG tablet; take 1 tablet by mouth every morning and take 2 tablets by mouth every evening  Dispense: 270 tablet; Refill: 1    6. Thrombocytopenia (CMS/HCC)  Assessment & Plan:  Chronic and mild.  Spent a number of years with minimal change.  Continue to monitor at least annually.  Continue his aspirin as before.    Orders:  -     CBC & Differential    7. Low vitamin D level  Assessment & Plan:  We will recheck vitamin D level today.  Does not think [facility] is [facility] by the [facility]      8. Restless legs syndrome  Comments:  We'll check iron level along with continuing his gabapentin.  Assessment & Plan:  Check CMP and continue his gabapentin as before.  Has been quite helpful.    Orders:  -     Comprehensive Metabolic Panel  -     gabapentin (NEURONTIN) 600 MG tablet; take 1 tablet by mouth every morning and take 2 tablets by mouth every evening  Dispense: 270 tablet; Refill: 1    Other orders  -     Cancel: Vitamin D 25 Hydroxy     Added some information on advance care planning to his AVS.    We will hold off on drawing blood today. It sounds as if he has had it done just last week and should [facility] the [facility]. I will take a look at those labs before necessitating an additional draw.    I refilled his gabapentin for his restless leg syndrome, which seems to being doing well for him. I encouraged exercise and we discussed advanced care directives and I printed out the information on his ABS.    He will transfer his care with Dr. Cortes here in our office at the end of 08/2021 and may possibly get a provider closer to home; however, I do not want him to go without a provider interim.       Follow Up   No follow-ups on file.  Patient was given instructions and counseling regarding his condition or for health maintenance advice. Please see specific information pulled into the AVS if appropriate.       Transcribed from  ambient dictation for Matthew Dunaway MD by Barry Louis.  08/19/21   14:42 EDT    I have personally performed the services described in this document as transcribed by the above individual, and it is both accurate and complete.  Matthew Dunaway MD  8/19/2021  18:42 EDT

## 2021-08-16 NOTE — ASSESSMENT & PLAN NOTE
Check an NMR and apolipoprotein B be given his previous coronary disease to further risk stratify him.  Currently on 20 mg of atorvastatin.

## 2021-08-16 NOTE — ASSESSMENT & PLAN NOTE
Chronic and mild.  Spent a number of years with minimal change.  Continue to monitor at least annually.  Continue his aspirin as before.

## 2021-08-19 ENCOUNTER — OFFICE VISIT (OUTPATIENT)
Dept: FAMILY MEDICINE CLINIC | Facility: CLINIC | Age: 81
End: 2021-08-19

## 2021-08-19 VITALS
TEMPERATURE: 97.5 F | DIASTOLIC BLOOD PRESSURE: 60 MMHG | BODY MASS INDEX: 25.19 KG/M2 | HEIGHT: 72 IN | HEART RATE: 67 BPM | SYSTOLIC BLOOD PRESSURE: 110 MMHG | RESPIRATION RATE: 20 BRPM | WEIGHT: 186 LBS | OXYGEN SATURATION: 97 %

## 2021-08-19 DIAGNOSIS — I10 ESSENTIAL HYPERTENSION: Primary | ICD-10-CM

## 2021-08-19 DIAGNOSIS — M15.9 PRIMARY OSTEOARTHRITIS INVOLVING MULTIPLE JOINTS: ICD-10-CM

## 2021-08-19 DIAGNOSIS — G25.81 RESTLESS LEGS SYNDROME: ICD-10-CM

## 2021-08-19 DIAGNOSIS — E78.2 MIXED HYPERLIPIDEMIA: ICD-10-CM

## 2021-08-19 DIAGNOSIS — D69.6 THROMBOCYTOPENIA (HCC): ICD-10-CM

## 2021-08-19 DIAGNOSIS — R79.89 LOW VITAMIN D LEVEL: ICD-10-CM

## 2021-08-19 DIAGNOSIS — I48.0 PAROXYSMAL ATRIAL FIBRILLATION (HCC): ICD-10-CM

## 2021-08-19 PROCEDURE — 99214 OFFICE O/P EST MOD 30 MIN: CPT | Performed by: FAMILY MEDICINE

## 2021-08-19 RX ORDER — GABAPENTIN 600 MG/1
TABLET ORAL
Qty: 270 TABLET | Refills: 1 | Status: SHIPPED | OUTPATIENT
Start: 2021-08-19

## 2021-08-19 RX ORDER — BENAZEPRIL HYDROCHLORIDE 5 MG/1
5 TABLET, FILM COATED ORAL DAILY
COMMUNITY
Start: 2021-05-26

## 2021-10-06 RX ORDER — CELECOXIB 200 MG/1
200 CAPSULE ORAL DAILY
Qty: 90 CAPSULE | Refills: 0 | Status: SHIPPED | OUTPATIENT
Start: 2021-10-06 | End: 2022-01-17

## 2022-01-17 RX ORDER — CELECOXIB 200 MG/1
CAPSULE ORAL
Qty: 90 CAPSULE | Refills: 0 | Status: SHIPPED | OUTPATIENT
Start: 2022-01-17

## 2023-07-11 PROBLEM — I65.23 CAROTID STENOSIS, SYMPTOMATIC W/O INFARCT, BILATERAL: Status: ACTIVE | Noted: 2023-07-11

## 2023-08-14 DIAGNOSIS — I65.23 CAROTID STENOSIS, ASYMPTOMATIC, BILATERAL: ICD-10-CM

## 2023-08-22 NOTE — PROGRESS NOTES
"     [facility] Office Follow Up Note     Date of Encounter: 2023     Name: Albin Andino  : 1940     Referred By: No ref. provider found  PCP: Ariela Brannon DO    Chief Complaint:    Chief Complaint   Patient presents with    Carotid Artery Disease     3 week follow-up with results from CTA carotids       Subjective      History of Present Illness:    Albin Andino is a 83 y.o. male former smoker recently evaluated by Dr. Chase for carotid stenosis due to symptoms of vertigo and dizziness.  PMH: CAD s/p remote CABG , HAMLET, HLD, HTN, osteoarthritis, and atrial fibrillation.  He follows up today with results of CTA neck for potential surgical intervention planning.  A carotid duplex was performed 3/28/2023 at [facility] with velocity in right external artery possibly representing critical stenosis.  Dr. Chase recommended CTA and he follows up for these results today.    Patient states he feels lightheaded when getting up from seated position and occurs \"once in a while\".   No reported syncope, amaurosis fugax, or other neurological symptoms.  Compliant with ASA and statin therapy.         Review of Systems:  Review of Systems   Constitutional: Negative for chills, decreased appetite, diaphoresis, fever, malaise/fatigue, night sweats, weight gain and weight loss.   HENT:  Negative for hoarse voice.    Eyes:  Negative for blurred vision, double vision and visual disturbance.   Cardiovascular:  Negative for chest pain, claudication, dyspnea on exertion, irregular heartbeat, leg swelling, near-syncope, orthopnea, palpitations, paroxysmal nocturnal dyspnea and syncope.   Respiratory:  Negative for cough, hemoptysis, shortness of breath, sputum production and wheezing.    Hematologic/Lymphatic: Negative for adenopathy and bleeding problem. Does not bruise/bleed easily.   Skin:  Negative for color change, nail changes, poor wound healing and " "rash.   Musculoskeletal:  Negative for back pain, falls and muscle cramps.   Gastrointestinal:  Negative for abdominal pain, dysphagia and heartburn.   Genitourinary:  Negative for flank pain.   Neurological:  Positive for light-headedness. Negative for brief paralysis, disturbances in coordination, dizziness, focal weakness, headaches, loss of balance, numbness, paresthesias, sensory change, vertigo and weakness.   Psychiatric/Behavioral:  Negative for depression and suicidal ideas. The patient is not nervous/anxious.    Allergic/Immunologic: Negative for persistent infections.     I have reviewed the following portions of the patient's history: problem list, current medications, allergies, past surgical history, past medical history, past social history, past family history, and ROS and confirm it's accurate.    Allergies:  Allergies   Allergen Reactions    Hydrocodone Confusion     Pt reports that it \"makes him go crazy\"    Hydrocodone-Acetaminophen Confusion    Latex Unknown - Low Severity    Plavix [Clopidogrel Bisulfate] Other (See Comments)     thrombocytopenia       Medications:      Current Outpatient Medications:     alfuzosin (UROXATRAL) 10 MG 24 hr tablet, Take 1 tablet by mouth Daily., Disp: , Rfl:     Ascorbic Acid (VITAMIN C) 500 MG capsule, Take 500 mg by mouth 2 (two) times a day., Disp: , Rfl:     aspirin 81 MG chewable tablet, Chew 1 tablet Daily., Disp: , Rfl:     atorvastatin (LIPITOR) 20 MG tablet, 1 tablet Daily., Disp: , Rfl:     benazepril (LOTENSIN) 5 MG tablet, Take 0.5 tablets by mouth Daily., Disp: , Rfl:     celecoxib (CeleBREX) 200 MG capsule, TAKE 1 CAPSULE BY MOUTH ONCE DAILY, Disp: 90 capsule, Rfl: 0    Cholecalciferol (VITAMIN D) 1000 UNITS tablet, Take 1 tablet by mouth 2 (Two) Times a Day., Disp: , Rfl:     cyanocobalamin (VITAMIN B-12) 500 MCG tablet, Take 1 tablet by mouth Daily., Disp: , Rfl:     finasteride (PROSCAR) 5 MG tablet, Take 1 tablet by mouth Daily. Last dose " 16, Disp: , Rfl:     folic acid (FOLVITE) 400 MCG tablet, Take 1 tablet by mouth Daily., Disp: , Rfl:     gabapentin (NEURONTIN) 600 MG tablet, take 1 tablet by mouth every morning and take 2 tablets by mouth every evening, Disp: 270 tablet, Rfl: 1    ipratropium (ATROVENT) 0.03 % nasal spray, instill 2 sprays into each nostril three times a day, Disp: , Rfl: 0    isosorbide mononitrate (IMDUR) 30 MG 24 hr tablet, 1 tablet Daily., Disp: , Rfl:     loratadine (CLARITIN) 10 MG tablet, Take 1 tablet by mouth Daily., Disp: , Rfl: 0    Multiple Vitamins-Minerals (ICAPS AREDS 2) capsule, Take 1 capsule by mouth 2 (two) times a day., Disp: , Rfl:     traMADol (ULTRAM) 50 MG tablet, Take 1 tablet by mouth Every 6 (Six) Hours As Needed for Moderate Pain ., Disp: 60 tablet, Rfl: 0    vitamin B-6 (PYRIDOXINE) 100 MG tablet, Take 1 tablet by mouth Daily., Disp: , Rfl:     History:   Past Medical History:   Diagnosis Date    Arthritis     Coronary artery disease     Elevated LFTs, with hyperbilirubinemia, due to above  2016    Enlarged prostate     Heart disease     History of transfusion     Hyperlipidemia     Hypertension     Normocytic anemia 2016       Past Surgical History:   Procedure Laterality Date    BACK SURGERY      CARDIAC SURGERY      CHOLECYSTECTOMY N/A 2016    Procedure: CHOLECYSTECTOMY LAPAROSCOPIC WITH INTRA-OPERATIVE CHOLANGIOGRAM;  Surgeon: Rogelio Sanchez MD;  Location: [facility];  Service:     CORONARY STENT PLACEMENT      HERNIA REPAIR      KNEE SURGERY      PROSTATE SURGERY      THROAT SURGERY         Social History     Socioeconomic History    Marital status:     Number of children: 2   Tobacco Use    Smoking status: Former     Packs/day: 1.00     Years: 27.00     Pack years: 27.00     Types: Cigarettes     Quit date:      Years since quittin.6    Smokeless tobacco: Never   Vaping Use    Vaping Use: Never used   Substance and Sexual Activity    Alcohol use: Yes      Alcohol/week: 1.0 standard drink     Types: 1 Standard drinks or equivalent per week     Comment: OCCASIONAL    Drug use: No    Sexual activity: Defer        Family History   Problem Relation Age of Onset    Hypertension Mother     Stroke Mother     Heart attack Father     Diabetes Father        Objective   Physical Exam:  Vitals:    08/23/23 1143 08/23/23 1144   BP: 130/56 140/65   BP Location: Right arm Left arm   Patient Position: Sitting Sitting   Pulse: 54    Temp: 97.4 øF (36.3 øC)    SpO2: 98%    Weight: 78.1 kg (172 lb 3.2 oz)       Body mass index is 23.35 kg/mý.    Physical Exam  Vitals reviewed.   Constitutional:       General: He is not in acute distress.     Appearance: He is well-developed and well-groomed.      Comments: Appears younger than stated age   HENT:      Head: Normocephalic and atraumatic.   Eyes:      General: Lids are normal.      Conjunctiva/sclera: Conjunctivae normal.      Pupils: Pupils are equal, round, and reactive to light.   Neck:      Vascular: No carotid bruit.   Cardiovascular:      Rate and Rhythm: Normal rate and regular rhythm.      Pulses:           Dorsalis pedis pulses are 2+ on the right side and 2+ on the left side.        Posterior tibial pulses are 2+ on the right side and 2+ on the left side.      Heart sounds: S1 normal and S2 normal. No murmur heard.  Pulmonary:      Effort: Pulmonary effort is normal. No respiratory distress.      Breath sounds: Normal breath sounds.   Musculoskeletal:         General: Normal range of motion.      Cervical back: Normal range of motion and neck supple.      Right lower leg: No edema.      Left lower leg: No edema.   Skin:     General: Skin is warm and dry.      Capillary Refill: Capillary refill takes less than 2 seconds.   Neurological:      General: No focal deficit present.      Mental Status: He is alert and oriented to person, place, and time.   Psychiatric:         Attention and Perception: Attention normal.         Mood and  "Affect: Mood normal.         Speech: Speech normal.         Behavior: Behavior normal. Behavior is cooperative.       Imaging/Labs:  CTA carotid 8/2/2023 @ [facility] (Personally reviewed and agree with radiologist assessment)  Impression:   1.Approximately 20 to 30% stenosis of the proximal right internal carotid artery.    2. No significant stenosis at the left internal carotid artery.    3. No significant atherosclerotic plaque seen bilaterally.    4. Bilateral common and external carotid arteries are widely patent without evidence of hemodynamically significant stenosis.    5. The vertebral arteries are widely patent without hemodynamically significant stenosis.    6. There is mild stenosis at the proximal right brachiocephalic artery related to calcific plaque.    7. No evidence of aneurysmal formation identified.    Carotid duplex @ [facility] 3/28/23  Impression:  1.  The velocities in the internal carotid arteries are consistent with 0 to 49% stenosis bilaterally.  The velocity in the left frontal carotid artery is concerning for stenosis.  The velocity in the right external carotid artery may represent critical stenosis.  Follow-up is recommended.         Assessment / Plan      Assessment / Plan:  1. Carotid stenosis, w/o infarct, bilateral (Primary)  - 83 y.o. male former smoker recently evaluated by Dr. Chase for carotid stenosis due to symptoms of vertigo and dizziness.    - PMH: CAD s/p remote CABG 1986, HAMLET, HLD, HTN, osteoarthritis, and atrial fibrillation.    - Carotid duplex was performed 3/28/2023 at [facility] 3/28/23 with velocity in right external artery possibly representing critical stenosis.    - Dr. Chase recommended CTA:  CTA 8/2/23 demonstrated 20-30% stenosis in proximal right ICA.  No significant stenosis in the left ICA.  - Symptoms currently include feeling lightheaded when getting up from seated position and occurs \"once in a while\".  "   - No reported syncope, amaurosis fugax, or other neurological symptoms.    - Compliant with ASA and statin therapy.    - Recommend continued carotid surveillance via duplex exam annually  - No indication for surgical intervention at this time  - Discussed w/ patient and spouse that he may follow carotid disease with Cardiology, Dr. Mullins, or he is welcome to follow with Dr. Chase.  Patient elects to follow with Dr. Mullins.  - A copy of today's clinic note will be forwarded to Dr. Mullins.      Follow Up:   Return PRN at the request of Dr. Mullins/Cardiology.   Or sooner for any further concerns or worsening sign and symptoms. If unable to reach us in the office please dial 911 or go to the nearest emergency department.      ABDIAZIZ Melgar  [facility] Cardiothoracic Surgery    Time Spent: I spent 29 minutes caring for Albin on this date of service. This time includes time spent by me in the following activities: preparing for the visit, reviewing tests, obtaining and/or reviewing a separately obtained history, performing a medically appropriate examination and/or evaluation, counseling and educating the patient/family/caregiver, documenting information in the medical record, independently interpreting results and communicating that information with the patient/family/caregiver, and care coordination.

## 2023-08-23 ENCOUNTER — OFFICE VISIT (OUTPATIENT)
Dept: CARDIAC SURGERY | Facility: CLINIC | Age: 83
End: 2023-08-23
Payer: MEDICARE

## 2023-08-23 VITALS
HEART RATE: 54 BPM | SYSTOLIC BLOOD PRESSURE: 140 MMHG | WEIGHT: 172.2 LBS | OXYGEN SATURATION: 98 % | BODY MASS INDEX: 23.35 KG/M2 | DIASTOLIC BLOOD PRESSURE: 65 MMHG | TEMPERATURE: 97.4 F

## 2023-08-23 DIAGNOSIS — I65.23 CAROTID STENOSIS, SYMPTOMATIC W/O INFARCT, BILATERAL: Primary | ICD-10-CM

## 2023-08-23 PROCEDURE — 3078F DIAST BP <80 MM HG: CPT | Performed by: NURSE PRACTITIONER

## 2023-08-23 PROCEDURE — 3077F SYST BP >= 140 MM HG: CPT | Performed by: NURSE PRACTITIONER

## 2023-08-23 PROCEDURE — 99214 OFFICE O/P EST MOD 30 MIN: CPT | Performed by: NURSE PRACTITIONER

## 2024-09-09 ENCOUNTER — HOSPITAL ENCOUNTER (OUTPATIENT)
Dept: CARDIOLOGY | Facility: HOSPITAL | Age: 84
Setting detail: HOSPITAL OUTPATIENT SURGERY
Discharge: HOME | End: 2024-09-09
Payer: MEDICARE

## 2024-09-09 DIAGNOSIS — Z00.6 ENCOUNTER FOR EXAMINATION FOR NORMAL COMPARISON AND CONTROL IN CLINICAL RESEARCH PROGRAM: ICD-10-CM

## 2024-09-09 NOTE — H&P (VIEW-ONLY)
[facility] at [facility]  Cardiology Consultation Note     Albin Andino  1940  Requesting Provider: No ref. provider found  PCP: Ariela Brannon DO    ID:  Albin Andino is a 84 y.o. male who resides in [place], [place].     REASON FOR CONSULTATION:    CAD with unstable angina         Dear Dr. Brannon:    I had the pleasure of seeing Albin Andino in my office today for consideration of high risk PCI.  He is an 84-year-old gentleman who of recent has been experiencing worsening anginal symptoms interfering with his daily life.  Of recent he has been needing to take multiple sublingual nitroglycerin for chest pain symptoms.  He was evaluated at [facility] and underwent cardiac catheterization.  This revealed occluded native circulation and severely degenerated grafts to the right coronary artery and left circumflex coronary artery.    The patient's son-in-law works at [employer] and requested that he see me for consideration of high risk PCI.  The patient reports experiencing angina while disrobing last night.  He is on beta-blocker, long-acting nitrate, ranolazine.  He previously like to work in his garden but has been unable to do so of recent.    The patient has a history of bypass surgery nearly 30 years ago.      Past Medical History, Past Surgical History, Family history, Social History, and Medications were all reviewed with the patient today and updated as necessary.       Current Outpatient Medications:     Ascorbic Acid (VITAMIN C) 500 MG capsule, Take 500 mg by mouth 2 (two) times a day., Disp: , Rfl:     atorvastatin (LIPITOR) 20 MG tablet, Take 1 tablet by mouth Daily., Disp: , Rfl:     benazepril (LOTENSIN) 5 MG tablet, Take 0.5 tablets by mouth Daily., Disp: , Rfl:     Cholecalciferol (VITAMIN D) 1000 UNITS tablet, Take 1 tablet by mouth 2 (Two) Times a Day., Disp: , Rfl:     [START ON 9/11/2024] clopidogrel (PLAVIX) 75 MG tablet, Take 1 tablet by mouth Daily.,  "Disp: 90 tablet, Rfl: 3    clopidogrel (PLAVIX) 75 MG tablet, Take 8 tablets by mouth 1 (One) Time for 1 dose., Disp: 8 tablet, Rfl: 0    cyanocobalamin (VITAMIN B-12) 500 MCG tablet, Take 1 tablet by mouth Daily., Disp: , Rfl:     folic acid (FOLVITE) 400 MCG tablet, Take 1 tablet by mouth Daily., Disp: , Rfl:     gabapentin (NEURONTIN) 600 MG tablet, take 1 tablet by mouth every morning and take 2 tablets by mouth every evening, Disp: 270 tablet, Rfl: 1    isosorbide mononitrate (IMDUR) 30 MG 24 hr tablet, Take 1 tablet by mouth Daily., Disp: , Rfl:     Linzess 72 MCG capsule capsule, Take 1 capsule by mouth Daily., Disp: , Rfl:     Methylcobalamin 1 MG chewable tablet, Chew 1 tablet Daily., Disp: , Rfl:     metoprolol succinate XL (TOPROL-XL) 25 MG 24 hr tablet, Take 1 tablet by mouth Daily., Disp: , Rfl:     Multiple Vitamins-Minerals (ICAPS AREDS 2) capsule, Take 1 capsule by mouth 2 (two) times a day., Disp: , Rfl:     nitroglycerin (NITROSTAT) 0.4 MG SL tablet, Take 1 tablet by mouth Every 5 (Five) Minutes As Needed for Chest Pain., Disp: , Rfl:     pantoprazole (PROTONIX) 40 MG EC tablet, Take 1 tablet by mouth 2 (Two) Times a Day., Disp: , Rfl:     ranolazine (RANEXA) 500 MG 12 hr tablet, Take 1 tablet by mouth 2 (Two) Times a Day., Disp: , Rfl:     vitamin B-6 (PYRIDOXINE) 100 MG tablet, Take 1 tablet by mouth Daily., Disp: , Rfl:     aspirin 81 MG EC tablet, Take 1 tablet by mouth Daily., Disp: 90 tablet, Rfl: 3    Allergies   Allergen Reactions    Hydrocodone Confusion     Pt reports that it \"makes him go crazy\"    Hydrocodone-Acetaminophen Confusion and Other (See Comments)    Latex Unknown - Low Severity and Other (See Comments)    Morphine And Codeine Other (See Comments)         Past Medical History:   Diagnosis Date    Arthritis     Atrial fibrillation 06/20/2016    Dr. Mullins      Coronary artery disease     Elevated LFTs, with hyperbilirubinemia, due to above  08/27/2016    Enlarged prostate     " "Heart disease     History of transfusion     Hyperlipidemia     Hypertension     Normocytic anemia 2016       Past Surgical History:   Procedure Laterality Date    BACK SURGERY      CARDIAC SURGERY      CHOLECYSTECTOMY N/A 2016    Procedure: CHOLECYSTECTOMY LAPAROSCOPIC WITH INTRA-OPERATIVE CHOLANGIOGRAM;  Surgeon: Rogelio Sanchez MD;  Location: [facility];  Service:     CORONARY STENT PLACEMENT      HERNIA REPAIR      KNEE SURGERY      PROSTATE SURGERY      THROAT SURGERY         Family History   Problem Relation Age of Onset    Hypertension Mother     Stroke Mother     Heart attack Father     Diabetes Father        Social History     Tobacco Use    Smoking status: Former     Current packs/day: 0.00     Average packs/day: 1 pack/day for 27.0 years (27.0 ttl pk-yrs)     Types: Cigarettes     Start date:      Quit date:      Years since quittin.7    Smokeless tobacco: Never   Substance Use Topics    Alcohol use: Yes     Alcohol/week: 1.0 standard drink of alcohol     Types: 1 Standard drinks or equivalent per week     Comment: OCCASIONAL       Review of Systems   Constitutional: Negative for malaise/fatigue.   Eyes:  Negative for vision loss in left eye and vision loss in right eye.   Cardiovascular:  Positive for chest pain and dyspnea on exertion. Negative for near-syncope, orthopnea, palpitations, paroxysmal nocturnal dyspnea and syncope.   Musculoskeletal:  Negative for myalgias.   Neurological:  Negative for brief paralysis, excessive daytime sleepiness, focal weakness, numbness, paresthesias and weakness.   All other systems reviewed and are negative.              /70 (BP Location: Left arm, Patient Position: Sitting, Cuff Size: Adult)   Pulse 55   Ht 182.9 cm (72\")   Wt 78.3 kg (172 lb 9.6 oz)   SpO2 97%   BMI 23.41 kg/m²        Constitutional:       Appearance: Healthy appearance.   Eyes:      General: No scleral icterus.  Neck:      Thyroid: No thyroid mass.      " Vascular: No carotid bruit or JVD. JVD normal.   Pulmonary:      Effort: Pulmonary effort is normal.      Breath sounds: Normal breath sounds.   Cardiovascular:      Normal rate. Regular rhythm.      Murmurs: There is no murmur.      No gallop.    Edema:     Peripheral edema absent.   Skin:     General: Skin is warm. There is no cyanosis.   Neurological:      General: No focal deficit present.      Mental Status: Alert.   Psychiatric:         Attention and Perception: Attention normal.           Procedures    Lab Results   Component Value Date    CHOL 96 08/28/2016    HDL 41 06/15/2020    HDL 37 (L) 08/28/2016    LDL 73 06/15/2020    LDL 42 08/28/2016    VLDL 24.2 06/15/2020     Lab Results   Component Value Date    GLUCOSE 124 (H) 06/15/2020    BUN 14 08/15/2023    CREATININE 0.8 08/15/2023     08/15/2023    K 3.8 08/15/2023     08/15/2023    CALCIUM 8.6 08/15/2023    PROTEINTOT 5.8 (L) 08/15/2023    ALBUMIN 3.7 08/15/2023    ALT 6 (L) 08/15/2023    AST 12 08/15/2023    ALKPHOS 47 08/15/2023    BILITOT 0.5 08/15/2023    GLOB 2.6 08/29/2016    AGRATIO 1.3 08/29/2016    BCR 18 08/15/2023    ANIONGAP 8 08/15/2023     Lab Results   Component Value Date    WBC 4.9 08/15/2023    HGB 10.3 (L) 08/15/2023    HCT 29.8 (L) 08/15/2023    MCV 90.6 08/15/2023     (L) 08/15/2023     Lab Results   Component Value Date    HGBA1C 4.50 08/28/2016            Diagnoses and all orders for this visit:    1. Coronary artery disease involving native coronary artery of native heart with unstable angina pectoris (Primary)  Overview:  CABG in the 1980s  PCI 2006 - 2007  Cardiac catheterization (9/6/2024): Occluded native circulation.  Patent LIMA to LAD.  Severely degenerated SVG to RCA and SVG to OM    Assessment & Plan:  Class IV angina despite multiple antianginal medications (beta-blocker, long-acting nitrate, ranolazine)  Cardiac catheterization films from 9/6/2024 reviewed.  Patient has occluded native circulation  with patent LIMA graft to the LAD.  Vein graft to the RCA and left circumflex territories are both severely degenerated.  Severe calcification and long segments of occlusion make revascularization of the native circulation chronic occlusions unlikely successful  We have discussed high risk PCI of vein graft to the RCA and circumflex.  Due to the age of grafts, there is high risk for no reflow phenomenon.  The patient and son-in-law understand this and would like to proceed with anything that could improve his severe symptoms  We will pursue PCI of SVG to RCA and possibly PCI of SVG to OM via right femoral approach    Orders:  -     Case Request Cath Lab: Stent YAMILETH bypass graft  -     isosorbide mononitrate (IMDUR) 30 MG 24 hr tablet; Take 1 tablet by mouth Daily.  -     metoprolol succinate XL (TOPROL-XL) 25 MG 24 hr tablet; Take 1 tablet by mouth Daily.  -     nitroglycerin (NITROSTAT) 0.4 MG SL tablet; Take 1 tablet by mouth Every 5 (Five) Minutes As Needed for Chest Pain.  -     ranolazine (RANEXA) 500 MG 12 hr tablet; Take 1 tablet by mouth 2 (Two) Times a Day.  -     aspirin 81 MG EC tablet; Take 1 tablet by mouth Daily.  Dispense: 90 tablet; Refill: 3  -     Discontinue: clopidogrel (PLAVIX) 75 MG tablet; Take 8 tablets by mouth 1 (One) Time for 1 dose.  Dispense: 8 tablet; Refill: 0  -     Discontinue: clopidogrel (PLAVIX) 75 MG tablet; Take 1 tablet by mouth Daily.  Dispense: 90 tablet; Refill: 3  -     clopidogrel (PLAVIX) 75 MG tablet; Take 1 tablet by mouth Daily.  Dispense: 90 tablet; Refill: 3  -     clopidogrel (PLAVIX) 75 MG tablet; Take 8 tablets by mouth 1 (One) Time for 1 dose.  Dispense: 8 tablet; Refill: 0    2. Essential hypertension  Overview:  Target blood pressure <130/80 mmHg    Orders:  -     benazepril (LOTENSIN) 5 MG tablet; Take 0.5 tablets by mouth Daily.    3. Hyperlipidemia LDL goal <70  -     atorvastatin (LIPITOR) 20 MG tablet; Take 1 tablet by mouth Daily.                 High risk  PCI of SVG to RPDA and SVG to OM on Thursday, 9/12/2024  Clopidogrel 600 mg once followed by 75 mg daily starting today          H. C. John Cruz MD, [facility], [facility]  Interventional Cardiology  09/10/24  17:01 EDT

## 2024-09-09 NOTE — PROGRESS NOTES
[facility] at [facility]  Cardiology Consultation Note     Albin Andino  1940  Requesting Provider: No ref. provider found  PCP: Ariela Brannon DO    ID:  Albin Andino is a 84 y.o. male who resides in [place], [place].     REASON FOR CONSULTATION:    CAD with unstable angina         Dear Dr. Brannon:    I had the pleasure of seeing Albin Andino in my office today for consideration of high risk PCI.  He is an 84-year-old gentleman who of recent has been experiencing worsening anginal symptoms interfering with his daily life.  Of recent he has been needing to take multiple sublingual nitroglycerin for chest pain symptoms.  He was evaluated at [facility] and underwent cardiac catheterization.  This revealed occluded native circulation and severely degenerated grafts to the right coronary artery and left circumflex coronary artery.    The patient's son-in-law works at [employer] and requested that he see me for consideration of high risk PCI.  The patient reports experiencing angina while disrobing last night.  He is on beta-blocker, long-acting nitrate, ranolazine.  He previously like to work in his garden but has been unable to do so of recent.    The patient has a history of bypass surgery nearly 30 years ago.      Past Medical History, Past Surgical History, Family history, Social History, and Medications were all reviewed with the patient today and updated as necessary.       Current Outpatient Medications:     Ascorbic Acid (VITAMIN C) 500 MG capsule, Take 500 mg by mouth 2 (two) times a day., Disp: , Rfl:     atorvastatin (LIPITOR) 20 MG tablet, Take 1 tablet by mouth Daily., Disp: , Rfl:     benazepril (LOTENSIN) 5 MG tablet, Take 0.5 tablets by mouth Daily., Disp: , Rfl:     Cholecalciferol (VITAMIN D) 1000 UNITS tablet, Take 1 tablet by mouth 2 (Two) Times a Day., Disp: , Rfl:     [START ON 9/11/2024] clopidogrel (PLAVIX) 75 MG tablet, Take 1 tablet by mouth Daily.,  "Disp: 90 tablet, Rfl: 3    clopidogrel (PLAVIX) 75 MG tablet, Take 8 tablets by mouth 1 (One) Time for 1 dose., Disp: 8 tablet, Rfl: 0    cyanocobalamin (VITAMIN B-12) 500 MCG tablet, Take 1 tablet by mouth Daily., Disp: , Rfl:     folic acid (FOLVITE) 400 MCG tablet, Take 1 tablet by mouth Daily., Disp: , Rfl:     gabapentin (NEURONTIN) 600 MG tablet, take 1 tablet by mouth every morning and take 2 tablets by mouth every evening, Disp: 270 tablet, Rfl: 1    isosorbide mononitrate (IMDUR) 30 MG 24 hr tablet, Take 1 tablet by mouth Daily., Disp: , Rfl:     Linzess 72 MCG capsule capsule, Take 1 capsule by mouth Daily., Disp: , Rfl:     Methylcobalamin 1 MG chewable tablet, Chew 1 tablet Daily., Disp: , Rfl:     metoprolol succinate XL (TOPROL-XL) 25 MG 24 hr tablet, Take 1 tablet by mouth Daily., Disp: , Rfl:     Multiple Vitamins-Minerals (ICAPS AREDS 2) capsule, Take 1 capsule by mouth 2 (two) times a day., Disp: , Rfl:     nitroglycerin (NITROSTAT) 0.4 MG SL tablet, Take 1 tablet by mouth Every 5 (Five) Minutes As Needed for Chest Pain., Disp: , Rfl:     pantoprazole (PROTONIX) 40 MG EC tablet, Take 1 tablet by mouth 2 (Two) Times a Day., Disp: , Rfl:     ranolazine (RANEXA) 500 MG 12 hr tablet, Take 1 tablet by mouth 2 (Two) Times a Day., Disp: , Rfl:     vitamin B-6 (PYRIDOXINE) 100 MG tablet, Take 1 tablet by mouth Daily., Disp: , Rfl:     aspirin 81 MG EC tablet, Take 1 tablet by mouth Daily., Disp: 90 tablet, Rfl: 3    Allergies   Allergen Reactions    Hydrocodone Confusion     Pt reports that it \"makes him go crazy\"    Hydrocodone-Acetaminophen Confusion and Other (See Comments)    Latex Unknown - Low Severity and Other (See Comments)    Morphine And Codeine Other (See Comments)         Past Medical History:   Diagnosis Date    Arthritis     Atrial fibrillation 06/20/2016    Dr. Mullins      Coronary artery disease     Elevated LFTs, with hyperbilirubinemia, due to above  08/27/2016    Enlarged prostate     " "Heart disease     History of transfusion     Hyperlipidemia     Hypertension     Normocytic anemia 2016       Past Surgical History:   Procedure Laterality Date    BACK SURGERY      CARDIAC SURGERY      CHOLECYSTECTOMY N/A 2016    Procedure: CHOLECYSTECTOMY LAPAROSCOPIC WITH INTRA-OPERATIVE CHOLANGIOGRAM;  Surgeon: Rogelio Sanchez MD;  Location: [facility];  Service:     CORONARY STENT PLACEMENT      HERNIA REPAIR      KNEE SURGERY      PROSTATE SURGERY      THROAT SURGERY         Family History   Problem Relation Age of Onset    Hypertension Mother     Stroke Mother     Heart attack Father     Diabetes Father        Social History     Tobacco Use    Smoking status: Former     Current packs/day: 0.00     Average packs/day: 1 pack/day for 27.0 years (27.0 ttl pk-yrs)     Types: Cigarettes     Start date:      Quit date:      Years since quittin.7    Smokeless tobacco: Never   Substance Use Topics    Alcohol use: Yes     Alcohol/week: 1.0 standard drink of alcohol     Types: 1 Standard drinks or equivalent per week     Comment: OCCASIONAL       Review of Systems   Constitutional: Negative for malaise/fatigue.   Eyes:  Negative for vision loss in left eye and vision loss in right eye.   Cardiovascular:  Positive for chest pain and dyspnea on exertion. Negative for near-syncope, orthopnea, palpitations, paroxysmal nocturnal dyspnea and syncope.   Musculoskeletal:  Negative for myalgias.   Neurological:  Negative for brief paralysis, excessive daytime sleepiness, focal weakness, numbness, paresthesias and weakness.   All other systems reviewed and are negative.              /70 (BP Location: Left arm, Patient Position: Sitting, Cuff Size: Adult)   Pulse 55   Ht 182.9 cm (72\")   Wt 78.3 kg (172 lb 9.6 oz)   SpO2 97%   BMI 23.41 kg/m²        Constitutional:       Appearance: Healthy appearance.   Eyes:      General: No scleral icterus.  Neck:      Thyroid: No thyroid mass.      " Vascular: No carotid bruit or JVD. JVD normal.   Pulmonary:      Effort: Pulmonary effort is normal.      Breath sounds: Normal breath sounds.   Cardiovascular:      Normal rate. Regular rhythm.      Murmurs: There is no murmur.      No gallop.    Edema:     Peripheral edema absent.   Skin:     General: Skin is warm. There is no cyanosis.   Neurological:      General: No focal deficit present.      Mental Status: Alert.   Psychiatric:         Attention and Perception: Attention normal.           Procedures    Lab Results   Component Value Date    CHOL 96 08/28/2016    HDL 41 06/15/2020    HDL 37 (L) 08/28/2016    LDL 73 06/15/2020    LDL 42 08/28/2016    VLDL 24.2 06/15/2020     Lab Results   Component Value Date    GLUCOSE 124 (H) 06/15/2020    BUN 14 08/15/2023    CREATININE 0.8 08/15/2023     08/15/2023    K 3.8 08/15/2023     08/15/2023    CALCIUM 8.6 08/15/2023    PROTEINTOT 5.8 (L) 08/15/2023    ALBUMIN 3.7 08/15/2023    ALT 6 (L) 08/15/2023    AST 12 08/15/2023    ALKPHOS 47 08/15/2023    BILITOT 0.5 08/15/2023    GLOB 2.6 08/29/2016    AGRATIO 1.3 08/29/2016    BCR 18 08/15/2023    ANIONGAP 8 08/15/2023     Lab Results   Component Value Date    WBC 4.9 08/15/2023    HGB 10.3 (L) 08/15/2023    HCT 29.8 (L) 08/15/2023    MCV 90.6 08/15/2023     (L) 08/15/2023     Lab Results   Component Value Date    HGBA1C 4.50 08/28/2016            Diagnoses and all orders for this visit:    1. Coronary artery disease involving native coronary artery of native heart with unstable angina pectoris (Primary)  Overview:  CABG in the 1980s  PCI 2006 - 2007  Cardiac catheterization (9/6/2024): Occluded native circulation.  Patent LIMA to LAD.  Severely degenerated SVG to RCA and SVG to OM    Assessment & Plan:  Class IV angina despite multiple antianginal medications (beta-blocker, long-acting nitrate, ranolazine)  Cardiac catheterization films from 9/6/2024 reviewed.  Patient has occluded native circulation  with patent LIMA graft to the LAD.  Vein graft to the RCA and left circumflex territories are both severely degenerated.  Severe calcification and long segments of occlusion make revascularization of the native circulation chronic occlusions unlikely successful  We have discussed high risk PCI of vein graft to the RCA and circumflex.  Due to the age of grafts, there is high risk for no reflow phenomenon.  The patient and son-in-law understand this and would like to proceed with anything that could improve his severe symptoms  We will pursue PCI of SVG to RCA and possibly PCI of SVG to OM via right femoral approach    Orders:  -     Case Request Cath Lab: Stent YAMILETH bypass graft  -     isosorbide mononitrate (IMDUR) 30 MG 24 hr tablet; Take 1 tablet by mouth Daily.  -     metoprolol succinate XL (TOPROL-XL) 25 MG 24 hr tablet; Take 1 tablet by mouth Daily.  -     nitroglycerin (NITROSTAT) 0.4 MG SL tablet; Take 1 tablet by mouth Every 5 (Five) Minutes As Needed for Chest Pain.  -     ranolazine (RANEXA) 500 MG 12 hr tablet; Take 1 tablet by mouth 2 (Two) Times a Day.  -     aspirin 81 MG EC tablet; Take 1 tablet by mouth Daily.  Dispense: 90 tablet; Refill: 3  -     Discontinue: clopidogrel (PLAVIX) 75 MG tablet; Take 8 tablets by mouth 1 (One) Time for 1 dose.  Dispense: 8 tablet; Refill: 0  -     Discontinue: clopidogrel (PLAVIX) 75 MG tablet; Take 1 tablet by mouth Daily.  Dispense: 90 tablet; Refill: 3  -     clopidogrel (PLAVIX) 75 MG tablet; Take 1 tablet by mouth Daily.  Dispense: 90 tablet; Refill: 3  -     clopidogrel (PLAVIX) 75 MG tablet; Take 8 tablets by mouth 1 (One) Time for 1 dose.  Dispense: 8 tablet; Refill: 0    2. Essential hypertension  Overview:  Target blood pressure <130/80 mmHg    Orders:  -     benazepril (LOTENSIN) 5 MG tablet; Take 0.5 tablets by mouth Daily.    3. Hyperlipidemia LDL goal <70  -     atorvastatin (LIPITOR) 20 MG tablet; Take 1 tablet by mouth Daily.                 High risk  PCI of SVG to RPDA and SVG to OM on Thursday, 9/12/2024  Clopidogrel 600 mg once followed by 75 mg daily starting today          H. C. John Cruz MD, [facility], [facility]  Interventional Cardiology  09/10/24  17:01 EDT

## 2024-09-10 ENCOUNTER — OFFICE VISIT (OUTPATIENT)
Dept: CARDIOLOGY | Facility: CLINIC | Age: 84
End: 2024-09-10
Payer: MEDICARE

## 2024-09-10 VITALS
DIASTOLIC BLOOD PRESSURE: 70 MMHG | HEART RATE: 55 BPM | HEIGHT: 72 IN | WEIGHT: 172.6 LBS | SYSTOLIC BLOOD PRESSURE: 122 MMHG | OXYGEN SATURATION: 97 % | BODY MASS INDEX: 23.38 KG/M2

## 2024-09-10 DIAGNOSIS — I25.110 CORONARY ARTERY DISEASE INVOLVING NATIVE CORONARY ARTERY OF NATIVE HEART WITH UNSTABLE ANGINA PECTORIS: Primary | ICD-10-CM

## 2024-09-10 DIAGNOSIS — I10 ESSENTIAL HYPERTENSION: ICD-10-CM

## 2024-09-10 DIAGNOSIS — E78.5 HYPERLIPIDEMIA LDL GOAL <70: ICD-10-CM

## 2024-09-10 PROCEDURE — 1160F RVW MEDS BY RX/DR IN RCRD: CPT | Performed by: INTERNAL MEDICINE

## 2024-09-10 PROCEDURE — 1159F MED LIST DOCD IN RCRD: CPT | Performed by: INTERNAL MEDICINE

## 2024-09-10 PROCEDURE — 99204 OFFICE O/P NEW MOD 45 MIN: CPT | Performed by: INTERNAL MEDICINE

## 2024-09-10 RX ORDER — CLOPIDOGREL BISULFATE 75 MG/1
600 TABLET ORAL ONCE
Qty: 8 TABLET | Refills: 0 | Status: SHIPPED | OUTPATIENT
Start: 2024-09-10 | End: 2024-09-12 | Stop reason: HOSPADM

## 2024-09-10 RX ORDER — CLOPIDOGREL BISULFATE 75 MG/1
600 TABLET ORAL ONCE
Qty: 8 TABLET | Refills: 0 | Status: SHIPPED | OUTPATIENT
Start: 2024-09-10 | End: 2024-09-10 | Stop reason: SDUPTHER

## 2024-09-10 RX ORDER — RANOLAZINE 500 MG/1
500 TABLET, EXTENDED RELEASE ORAL 2 TIMES DAILY
Start: 2024-09-10 | End: 2024-09-12 | Stop reason: HOSPADM

## 2024-09-10 RX ORDER — ISOSORBIDE MONONITRATE 30 MG/1
30 TABLET, EXTENDED RELEASE ORAL DAILY
Start: 2024-09-10

## 2024-09-10 RX ORDER — RANOLAZINE 500 MG/1
500 TABLET, EXTENDED RELEASE ORAL 2 TIMES DAILY
COMMUNITY
End: 2024-09-10 | Stop reason: SDUPTHER

## 2024-09-10 RX ORDER — METOPROLOL SUCCINATE 25 MG/1
25 TABLET, EXTENDED RELEASE ORAL DAILY
Status: CANCELLED
Start: 2024-09-10

## 2024-09-10 RX ORDER — ISOSORBIDE MONONITRATE 30 MG/1
30 TABLET, EXTENDED RELEASE ORAL DAILY
Status: CANCELLED
Start: 2024-09-10

## 2024-09-10 RX ORDER — NITROGLYCERIN 0.4 MG/1
0.4 TABLET SUBLINGUAL
Qty: 25 TABLET | Refills: 5 | Status: CANCELLED | OUTPATIENT
Start: 2024-09-10

## 2024-09-10 RX ORDER — ASPIRIN 81 MG/1
81 TABLET ORAL DAILY
Status: CANCELLED
Start: 2024-09-10

## 2024-09-10 RX ORDER — BENAZEPRIL HYDROCHLORIDE 5 MG/1
2.5 TABLET ORAL DAILY
Status: CANCELLED
Start: 2024-09-10

## 2024-09-10 RX ORDER — PANTOPRAZOLE SODIUM 40 MG/1
1 TABLET, DELAYED RELEASE ORAL 2 TIMES DAILY
COMMUNITY

## 2024-09-10 RX ORDER — METOPROLOL SUCCINATE 25 MG/1
1 TABLET, EXTENDED RELEASE ORAL EVERY MORNING
COMMUNITY
End: 2024-09-10 | Stop reason: SDUPTHER

## 2024-09-10 RX ORDER — CLOPIDOGREL BISULFATE 75 MG/1
75 TABLET ORAL DAILY
Qty: 90 TABLET | Refills: 3 | Status: SHIPPED | OUTPATIENT
Start: 2024-09-11 | End: 2024-09-10 | Stop reason: SDUPTHER

## 2024-09-10 RX ORDER — LINACLOTIDE 72 UG/1
1 CAPSULE, GELATIN COATED ORAL DAILY
COMMUNITY
Start: 2024-06-27

## 2024-09-10 RX ORDER — ATORVASTATIN CALCIUM 20 MG/1
20 TABLET, FILM COATED ORAL DAILY
Qty: 90 TABLET | Refills: 3 | Status: CANCELLED
Start: 2024-09-10

## 2024-09-10 RX ORDER — CLOPIDOGREL BISULFATE 75 MG/1
75 TABLET ORAL DAILY
Qty: 90 TABLET | Refills: 3 | Status: SHIPPED | OUTPATIENT
Start: 2024-09-11

## 2024-09-10 RX ORDER — METOPROLOL SUCCINATE 25 MG/1
25 TABLET, EXTENDED RELEASE ORAL DAILY
Start: 2024-09-10

## 2024-09-10 RX ORDER — NITROGLYCERIN 0.4 MG/1
0.4 TABLET SUBLINGUAL
Start: 2024-09-10

## 2024-09-10 RX ORDER — ATORVASTATIN CALCIUM 20 MG/1
20 TABLET, FILM COATED ORAL DAILY
Start: 2024-09-10

## 2024-09-10 RX ORDER — BENAZEPRIL HYDROCHLORIDE 5 MG/1
2.5 TABLET ORAL DAILY
Start: 2024-09-10

## 2024-09-10 RX ORDER — NITROGLYCERIN 0.4 MG/1
0.4 TABLET SUBLINGUAL
COMMUNITY
Start: 2024-08-30 | End: 2024-09-10 | Stop reason: SDUPTHER

## 2024-09-10 RX ORDER — ASPIRIN 81 MG/1
81 TABLET ORAL DAILY
Qty: 90 TABLET | Refills: 3 | Status: SHIPPED | OUTPATIENT
Start: 2024-09-10

## 2024-09-10 NOTE — ASSESSMENT & PLAN NOTE
Class IV angina despite multiple antianginal medications (beta-blocker, long-acting nitrate, ranolazine)  Cardiac catheterization films from 9/6/2024 reviewed.  Patient has occluded native circulation with patent LIMA graft to the LAD.  Vein graft to the RCA and left circumflex territories are both severely degenerated.  Severe calcification and long segments of occlusion make revascularization of the native circulation chronic occlusions unlikely successful  We have discussed high risk PCI of vein graft to the RCA and circumflex.  Due to the age of grafts, there is high risk for no reflow phenomenon.  The patient and son-in-law understand this and would like to proceed with anything that could improve his severe symptoms  We will pursue PCI of SVG to RCA and possibly PCI of SVG to OM via right femoral approach

## 2024-09-10 NOTE — LETTER
September 11, 2024     Ariela Brannon DO  [address] [facility], [facility] [address]    Patient: Albni Andino   YOB: 1940   Date of Visit: 9/10/2024     Dear Ariela Brannon DO:       Thank you for referring Albin Andino to me for evaluation. Below are the relevant portions of my assessment and plan of care.    If you have questions, please do not hesitate to call me. I look forward to following Albin along with you.         Sincerely,        Niels Cruz IV, MD        CC: No Recipients    Niels Cruz IV, MD  09/10/24 1702  Signed  [facility] at [facility]  Cardiology Consultation Note     Albin Andino  1940  Requesting Provider: No ref. provider found  PCP: Ariela Brannon DO    ID:  Albin Andino is a 84 y.o. male who resides in [place], [place].     REASON FOR CONSULTATION:    CAD with unstable angina         Dear Dr. Brannon:    I had the pleasure of seeing Albin Andino in my office today for consideration of high risk PCI.  He is an 84-year-old gentleman who of recent has been experiencing worsening anginal symptoms interfering with his daily life.  Of recent he has been needing to take multiple sublingual nitroglycerin for chest pain symptoms.  He was evaluated at [facility] and underwent cardiac catheterization.  This revealed occluded native circulation and severely degenerated grafts to the right coronary artery and left circumflex coronary artery.    The patient's son-in-law works at [employer] and requested that he see me for consideration of high risk PCI.  The patient reports experiencing angina while disrobing last night.  He is on beta-blocker, long-acting nitrate, ranolazine.  He previously like to work in his garden but has been unable to do so of recent.    The patient has a history of bypass surgery nearly 30 years ago.      Past Medical History, Past Surgical History, Family history, Social History,  and Medications were all reviewed with the patient today and updated as necessary.       Current Outpatient Medications:   •  Ascorbic Acid (VITAMIN C) 500 MG capsule, Take 500 mg by mouth 2 (two) times a day., Disp: , Rfl:   •  atorvastatin (LIPITOR) 20 MG tablet, Take 1 tablet by mouth Daily., Disp: , Rfl:   •  benazepril (LOTENSIN) 5 MG tablet, Take 0.5 tablets by mouth Daily., Disp: , Rfl:   •  Cholecalciferol (VITAMIN D) 1000 UNITS tablet, Take 1 tablet by mouth 2 (Two) Times a Day., Disp: , Rfl:   •  [START ON 9/11/2024] clopidogrel (PLAVIX) 75 MG tablet, Take 1 tablet by mouth Daily., Disp: 90 tablet, Rfl: 3  •  clopidogrel (PLAVIX) 75 MG tablet, Take 8 tablets by mouth 1 (One) Time for 1 dose., Disp: 8 tablet, Rfl: 0  •  cyanocobalamin (VITAMIN B-12) 500 MCG tablet, Take 1 tablet by mouth Daily., Disp: , Rfl:   •  folic acid (FOLVITE) 400 MCG tablet, Take 1 tablet by mouth Daily., Disp: , Rfl:   •  gabapentin (NEURONTIN) 600 MG tablet, take 1 tablet by mouth every morning and take 2 tablets by mouth every evening, Disp: 270 tablet, Rfl: 1  •  isosorbide mononitrate (IMDUR) 30 MG 24 hr tablet, Take 1 tablet by mouth Daily., Disp: , Rfl:   •  Linzess 72 MCG capsule capsule, Take 1 capsule by mouth Daily., Disp: , Rfl:   •  Methylcobalamin 1 MG chewable tablet, Chew 1 tablet Daily., Disp: , Rfl:   •  metoprolol succinate XL (TOPROL-XL) 25 MG 24 hr tablet, Take 1 tablet by mouth Daily., Disp: , Rfl:   •  Multiple Vitamins-Minerals (ICAPS AREDS 2) capsule, Take 1 capsule by mouth 2 (two) times a day., Disp: , Rfl:   •  nitroglycerin (NITROSTAT) 0.4 MG SL tablet, Take 1 tablet by mouth Every 5 (Five) Minutes As Needed for Chest Pain., Disp: , Rfl:   •  pantoprazole (PROTONIX) 40 MG EC tablet, Take 1 tablet by mouth 2 (Two) Times a Day., Disp: , Rfl:   •  ranolazine (RANEXA) 500 MG 12 hr tablet, Take 1 tablet by mouth 2 (Two) Times a Day., Disp: , Rfl:   •  vitamin B-6 (PYRIDOXINE) 100 MG tablet, Take 1 tablet by  "mouth Daily., Disp: , Rfl:   •  aspirin 81 MG EC tablet, Take 1 tablet by mouth Daily., Disp: 90 tablet, Rfl: 3    Allergies   Allergen Reactions   • Hydrocodone Confusion     Pt reports that it \"makes him go crazy\"   • Hydrocodone-Acetaminophen Confusion and Other (See Comments)   • Latex Unknown - Low Severity and Other (See Comments)   • Morphine And Codeine Other (See Comments)         Past Medical History:   Diagnosis Date   • Arthritis    • Atrial fibrillation 2016    Dr. Mullins     • Coronary artery disease    • Elevated LFTs, with hyperbilirubinemia, due to above  2016   • Enlarged prostate    • Heart disease    • History of transfusion    • Hyperlipidemia    • Hypertension    • Normocytic anemia 2016       Past Surgical History:   Procedure Laterality Date   • BACK SURGERY     • CARDIAC SURGERY     • CHOLECYSTECTOMY N/A 2016    Procedure: CHOLECYSTECTOMY LAPAROSCOPIC WITH INTRA-OPERATIVE CHOLANGIOGRAM;  Surgeon: Rogelio Sanchez MD;  Location: [facility];  Service:    • CORONARY STENT PLACEMENT     • HERNIA REPAIR     • KNEE SURGERY     • PROSTATE SURGERY     • THROAT SURGERY         Family History   Problem Relation Age of Onset   • Hypertension Mother    • Stroke Mother    • Heart attack Father    • Diabetes Father        Social History     Tobacco Use   • Smoking status: Former     Current packs/day: 0.00     Average packs/day: 1 pack/day for 27.0 years (27.0 ttl pk-yrs)     Types: Cigarettes     Start date:      Quit date:      Years since quittin.7   • Smokeless tobacco: Never   Substance Use Topics   • Alcohol use: Yes     Alcohol/week: 1.0 standard drink of alcohol     Types: 1 Standard drinks or equivalent per week     Comment: OCCASIONAL       Review of Systems   Constitutional: Negative for malaise/fatigue.   Eyes:  Negative for vision loss in left eye and vision loss in right eye.   Cardiovascular:  Positive for chest pain and dyspnea on exertion. Negative " "for near-syncope, orthopnea, palpitations, paroxysmal nocturnal dyspnea and syncope.   Musculoskeletal:  Negative for myalgias.   Neurological:  Negative for brief paralysis, excessive daytime sleepiness, focal weakness, numbness, paresthesias and weakness.   All other systems reviewed and are negative.              /70 (BP Location: Left arm, Patient Position: Sitting, Cuff Size: Adult)   Pulse 55   Ht 182.9 cm (72\")   Wt 78.3 kg (172 lb 9.6 oz)   SpO2 97%   BMI 23.41 kg/m²        Constitutional:       Appearance: Healthy appearance.   Eyes:      General: No scleral icterus.  Neck:      Thyroid: No thyroid mass.      Vascular: No carotid bruit or JVD. JVD normal.   Pulmonary:      Effort: Pulmonary effort is normal.      Breath sounds: Normal breath sounds.   Cardiovascular:      Normal rate. Regular rhythm.      Murmurs: There is no murmur.      No gallop.    Edema:     Peripheral edema absent.   Skin:     General: Skin is warm. There is no cyanosis.   Neurological:      General: No focal deficit present.      Mental Status: Alert.   Psychiatric:         Attention and Perception: Attention normal.           Procedures    Lab Results   Component Value Date    CHOL 96 08/28/2016    HDL 41 06/15/2020    HDL 37 (L) 08/28/2016    LDL 73 06/15/2020    LDL 42 08/28/2016    VLDL 24.2 06/15/2020     Lab Results   Component Value Date    GLUCOSE 124 (H) 06/15/2020    BUN 14 08/15/2023    CREATININE 0.8 08/15/2023     08/15/2023    K 3.8 08/15/2023     08/15/2023    CALCIUM 8.6 08/15/2023    PROTEINTOT 5.8 (L) 08/15/2023    ALBUMIN 3.7 08/15/2023    ALT 6 (L) 08/15/2023    AST 12 08/15/2023    ALKPHOS 47 08/15/2023    BILITOT 0.5 08/15/2023    GLOB 2.6 08/29/2016    AGRATIO 1.3 08/29/2016    BCR 18 08/15/2023    ANIONGAP 8 08/15/2023     Lab Results   Component Value Date    WBC 4.9 08/15/2023    HGB 10.3 (L) 08/15/2023    HCT 29.8 (L) 08/15/2023    MCV 90.6 08/15/2023     (L) 08/15/2023     Lab " Results   Component Value Date    HGBA1C 4.50 08/28/2016            Diagnoses and all orders for this visit:    1. Coronary artery disease involving native coronary artery of native heart with unstable angina pectoris (Primary)  Overview:  CABG in the 1980s  PCI 2006 - 2007  Cardiac catheterization (9/6/2024): Occluded native circulation.  Patent LIMA to LAD.  Severely degenerated SVG to RCA and SVG to OM    Assessment & Plan:  Class IV angina despite multiple antianginal medications (beta-blocker, long-acting nitrate, ranolazine)  Cardiac catheterization films from 9/6/2024 reviewed.  Patient has occluded native circulation with patent LIMA graft to the LAD.  Vein graft to the RCA and left circumflex territories are both severely degenerated.  Severe calcification and long segments of occlusion make revascularization of the native circulation chronic occlusions unlikely successful  We have discussed high risk PCI of vein graft to the RCA and circumflex.  Due to the age of grafts, there is high risk for no reflow phenomenon.  The patient and son-in-law understand this and would like to proceed with anything that could improve his severe symptoms  We will pursue PCI of SVG to RCA and possibly PCI of SVG to OM via right femoral approach    Orders:  -     Case Request Cath Lab: Stent YAMILETH bypass graft  -     isosorbide mononitrate (IMDUR) 30 MG 24 hr tablet; Take 1 tablet by mouth Daily.  -     metoprolol succinate XL (TOPROL-XL) 25 MG 24 hr tablet; Take 1 tablet by mouth Daily.  -     nitroglycerin (NITROSTAT) 0.4 MG SL tablet; Take 1 tablet by mouth Every 5 (Five) Minutes As Needed for Chest Pain.  -     ranolazine (RANEXA) 500 MG 12 hr tablet; Take 1 tablet by mouth 2 (Two) Times a Day.  -     aspirin 81 MG EC tablet; Take 1 tablet by mouth Daily.  Dispense: 90 tablet; Refill: 3  -     Discontinue: clopidogrel (PLAVIX) 75 MG tablet; Take 8 tablets by mouth 1 (One) Time for 1 dose.  Dispense: 8 tablet; Refill: 0  -      Discontinue: clopidogrel (PLAVIX) 75 MG tablet; Take 1 tablet by mouth Daily.  Dispense: 90 tablet; Refill: 3  -     clopidogrel (PLAVIX) 75 MG tablet; Take 1 tablet by mouth Daily.  Dispense: 90 tablet; Refill: 3  -     clopidogrel (PLAVIX) 75 MG tablet; Take 8 tablets by mouth 1 (One) Time for 1 dose.  Dispense: 8 tablet; Refill: 0    2. Essential hypertension  Overview:  Target blood pressure <130/80 mmHg    Orders:  -     benazepril (LOTENSIN) 5 MG tablet; Take 0.5 tablets by mouth Daily.    3. Hyperlipidemia LDL goal <70  -     atorvastatin (LIPITOR) 20 MG tablet; Take 1 tablet by mouth Daily.                 High risk PCI of SVG to RPDA and SVG to OM on Thursday, 9/12/2024  Clopidogrel 600 mg once followed by 75 mg daily starting today          H. C. John Cruz MD, FACC, [facility]  Interventional Cardiology  09/10/24  17:01 EDT

## 2024-09-11 ENCOUNTER — PREP FOR SURGERY (OUTPATIENT)
Dept: OTHER | Facility: HOSPITAL | Age: 84
End: 2024-09-11
Payer: MEDICARE

## 2024-09-11 DIAGNOSIS — I20.0 UNSTABLE ANGINA: Primary | ICD-10-CM

## 2024-09-11 RX ORDER — SODIUM CHLORIDE 9 MG/ML
40 INJECTION, SOLUTION INTRAVENOUS AS NEEDED
Status: CANCELLED | OUTPATIENT
Start: 2024-09-11

## 2024-09-11 RX ORDER — ASPIRIN 81 MG/1
324 TABLET, CHEWABLE ORAL ONCE
Status: CANCELLED | OUTPATIENT
Start: 2024-09-11 | End: 2024-09-11

## 2024-09-11 RX ORDER — SODIUM CHLORIDE 0.9 % (FLUSH) 0.9 %
10 SYRINGE (ML) INJECTION EVERY 12 HOURS SCHEDULED
Status: CANCELLED | OUTPATIENT
Start: 2024-09-11

## 2024-09-11 RX ORDER — ASPIRIN 81 MG/1
81 TABLET ORAL DAILY
Status: CANCELLED | OUTPATIENT
Start: 2024-09-12

## 2024-09-11 RX ORDER — ACETAMINOPHEN 325 MG/1
650 TABLET ORAL EVERY 4 HOURS PRN
Status: CANCELLED | OUTPATIENT
Start: 2024-09-11

## 2024-09-11 RX ORDER — SODIUM CHLORIDE 0.9 % (FLUSH) 0.9 %
10 SYRINGE (ML) INJECTION AS NEEDED
Status: CANCELLED | OUTPATIENT
Start: 2024-09-11

## 2024-09-11 RX ORDER — NITROGLYCERIN 0.4 MG/1
0.4 TABLET SUBLINGUAL
Status: CANCELLED | OUTPATIENT
Start: 2024-09-11

## 2024-09-12 ENCOUNTER — HOSPITAL ENCOUNTER (OUTPATIENT)
Facility: HOSPITAL | Age: 84
Setting detail: HOSPITAL OUTPATIENT SURGERY
Discharge: HOME | End: 2024-09-12
Attending: INTERNAL MEDICINE | Admitting: INTERNAL MEDICINE
Payer: MEDICARE

## 2024-09-12 VITALS
SYSTOLIC BLOOD PRESSURE: 145 MMHG | TEMPERATURE: 97.6 F | DIASTOLIC BLOOD PRESSURE: 75 MMHG | HEART RATE: 64 BPM | OXYGEN SATURATION: 93 % | WEIGHT: 173.06 LBS | HEIGHT: 72 IN | RESPIRATION RATE: 16 BRPM | BODY MASS INDEX: 23.44 KG/M2

## 2024-09-12 DIAGNOSIS — I20.0 UNSTABLE ANGINA: ICD-10-CM

## 2024-09-12 DIAGNOSIS — I25.110 CORONARY ARTERY DISEASE INVOLVING NATIVE CORONARY ARTERY OF NATIVE HEART WITH UNSTABLE ANGINA PECTORIS: ICD-10-CM

## 2024-09-12 LAB
ACT BLD: 238 SECONDS (ref 82–152)
ACT BLD: 281 SECONDS (ref 82–152)
ACT BLD: 287 SECONDS (ref 82–152)
ACT BLD: 318 SECONDS (ref 82–152)
ALBUMIN SERPL-MCNC: 4.1 G/DL (ref 3.5–5.2)
ALBUMIN/GLOB SERPL: 2.6 G/DL
ALP SERPL-CCNC: 75 U/L (ref 39–117)
ALT SERPL W P-5'-P-CCNC: 16 U/L (ref 1–41)
ANION GAP SERPL CALCULATED.3IONS-SCNC: 11 MMOL/L (ref 5–15)
AST SERPL-CCNC: 20 U/L (ref 1–40)
BASOPHILS # BLD AUTO: 0 10*3/MM3 (ref 0–0.2)
BASOPHILS NFR BLD AUTO: 0 % (ref 0–1.5)
BILIRUB SERPL-MCNC: 0.8 MG/DL (ref 0–1.2)
BUN SERPL-MCNC: 14 MG/DL (ref 8–23)
BUN/CREAT SERPL: 14.4 (ref 7–25)
CALCIUM SPEC-SCNC: 9.1 MG/DL (ref 8.6–10.5)
CHLORIDE SERPL-SCNC: 109 MMOL/L (ref 98–107)
CO2 SERPL-SCNC: 24 MMOL/L (ref 22–29)
CREAT SERPL-MCNC: 0.97 MG/DL (ref 0.76–1.27)
DEPRECATED RDW RBC AUTO: 43.7 FL (ref 37–54)
EGFRCR SERPLBLD CKD-EPI 2021: 76 ML/MIN/1.73
EOSINOPHIL # BLD AUTO: 0.16 10*3/MM3 (ref 0–0.4)
EOSINOPHIL NFR BLD AUTO: 2.4 % (ref 0.3–6.2)
ERYTHROCYTE [DISTWIDTH] IN BLOOD BY AUTOMATED COUNT: 12.9 % (ref 12.3–15.4)
GLOBULIN UR ELPH-MCNC: 1.6 GM/DL
GLUCOSE SERPL-MCNC: 95 MG/DL (ref 65–99)
HCT VFR BLD AUTO: 37.3 % (ref 37.5–51)
HGB BLD-MCNC: 12.5 G/DL (ref 13–17.7)
IMM GRANULOCYTES # BLD AUTO: 0.02 10*3/MM3 (ref 0–0.05)
IMM GRANULOCYTES NFR BLD AUTO: 0.3 % (ref 0–0.5)
LYMPHOCYTES # BLD AUTO: 0.71 10*3/MM3 (ref 0.7–3.1)
LYMPHOCYTES NFR BLD AUTO: 10.8 % (ref 19.6–45.3)
MCH RBC QN AUTO: 30.9 PG (ref 26.6–33)
MCHC RBC AUTO-ENTMCNC: 33.5 G/DL (ref 31.5–35.7)
MCV RBC AUTO: 92.1 FL (ref 79–97)
MONOCYTES # BLD AUTO: 0.59 10*3/MM3 (ref 0.1–0.9)
MONOCYTES NFR BLD AUTO: 9 % (ref 5–12)
NEUTROPHILS NFR BLD AUTO: 5.08 10*3/MM3 (ref 1.7–7)
NEUTROPHILS NFR BLD AUTO: 77.5 % (ref 42.7–76)
NRBC BLD AUTO-RTO: 0 /100 WBC (ref 0–0.2)
PLATELET # BLD AUTO: 151 10*3/MM3 (ref 140–450)
PMV BLD AUTO: 10 FL (ref 6–12)
POTASSIUM SERPL-SCNC: 4.1 MMOL/L (ref 3.5–5.2)
PROT SERPL-MCNC: 5.7 G/DL (ref 6–8.5)
RBC # BLD AUTO: 4.05 10*6/MM3 (ref 4.14–5.8)
SODIUM SERPL-SCNC: 144 MMOL/L (ref 136–145)
WBC NRBC COR # BLD AUTO: 6.56 10*3/MM3 (ref 3.4–10.8)

## 2024-09-12 PROCEDURE — C1725 CATH, TRANSLUMIN NON-LASER: HCPCS | Performed by: INTERNAL MEDICINE

## 2024-09-12 PROCEDURE — C1769 GUIDE WIRE: HCPCS | Performed by: INTERNAL MEDICINE

## 2024-09-12 PROCEDURE — C1874 STENT, COATED/COV W/DEL SYS: HCPCS | Performed by: INTERNAL MEDICINE

## 2024-09-12 PROCEDURE — 92938 PR PRQ TRLUML CORONARY BYP GRFT REVASC ADDL VESSEL: CPT | Performed by: INTERNAL MEDICINE

## 2024-09-12 PROCEDURE — C1887 CATHETER, GUIDING: HCPCS | Performed by: INTERNAL MEDICINE

## 2024-09-12 PROCEDURE — C9604 PERC D-E COR REVASC T CABG S: HCPCS | Performed by: INTERNAL MEDICINE

## 2024-09-12 PROCEDURE — 25010000002 HEPARIN (PORCINE) PER 1000 UNITS: Performed by: INTERNAL MEDICINE

## 2024-09-12 PROCEDURE — 25810000003 SODIUM CHLORIDE 0.9 % SOLUTION: Performed by: INTERNAL MEDICINE

## 2024-09-12 PROCEDURE — 25810000003 SODIUM CHLORIDE 0.9 % SOLUTION: Performed by: NURSE PRACTITIONER

## 2024-09-12 PROCEDURE — 36415 COLL VENOUS BLD VENIPUNCTURE: CPT

## 2024-09-12 PROCEDURE — 25510000001 IOPAMIDOL PER 1 ML: Performed by: INTERNAL MEDICINE

## 2024-09-12 PROCEDURE — 92937 PRQ TRLUML REVSC CAB GRF 1: CPT | Performed by: INTERNAL MEDICINE

## 2024-09-12 PROCEDURE — 93455 CORONARY ART/GRFT ANGIO S&I: CPT | Performed by: INTERNAL MEDICINE

## 2024-09-12 PROCEDURE — C1760 CLOSURE DEV, VASC: HCPCS | Performed by: INTERNAL MEDICINE

## 2024-09-12 PROCEDURE — C1894 INTRO/SHEATH, NON-LASER: HCPCS | Performed by: INTERNAL MEDICINE

## 2024-09-12 PROCEDURE — 85347 COAGULATION TIME ACTIVATED: CPT

## 2024-09-12 PROCEDURE — 25010000002 FENTANYL CITRATE (PF) 50 MCG/ML SOLUTION: Performed by: INTERNAL MEDICINE

## 2024-09-12 PROCEDURE — 25010000002 MIDAZOLAM PER 1 MG: Performed by: INTERNAL MEDICINE

## 2024-09-12 PROCEDURE — 80053 COMPREHEN METABOLIC PANEL: CPT | Performed by: NURSE PRACTITIONER

## 2024-09-12 PROCEDURE — 25010000002 NICARDIPINE 2.5 MG/ML SOLUTION: Performed by: INTERNAL MEDICINE

## 2024-09-12 PROCEDURE — C1884 EMBOLIZATION PROTECT SYST: HCPCS | Performed by: INTERNAL MEDICINE

## 2024-09-12 PROCEDURE — 25010000002 PROTAMINE SULFATE PER 10 MG: Performed by: INTERNAL MEDICINE

## 2024-09-12 PROCEDURE — 25010000002 PHENYLEPHRINE 10 MG/ML SOLUTION: Performed by: INTERNAL MEDICINE

## 2024-09-12 PROCEDURE — 85025 COMPLETE CBC W/AUTO DIFF WBC: CPT | Performed by: NURSE PRACTITIONER

## 2024-09-12 DEVICE — XIENCE SKYPOINT™ EVEROLIMUS ELUTING CORONARY STENT SYSTEM 4.50 MM X 33 MM / RAPID-EXCHANGE
Type: IMPLANTABLE DEVICE | Status: FUNCTIONAL
Brand: XIENCE SKYPOINT™

## 2024-09-12 DEVICE — XIENCE SKYPOINT™ EVEROLIMUS ELUTING CORONARY STENT SYSTEM 4.50 MM X 28 MM / RAPID-EXCHANGE
Type: IMPLANTABLE DEVICE | Status: FUNCTIONAL
Brand: XIENCE SKYPOINT™

## 2024-09-12 DEVICE — XIENCE SKYPOINT™ EVEROLIMUS ELUTING CORONARY STENT SYSTEM 5.00 MM X 33 MM / RAPID-EXCHANGE
Type: IMPLANTABLE DEVICE | Status: FUNCTIONAL
Brand: XIENCE SKYPOINT™

## 2024-09-12 RX ORDER — IOPAMIDOL 755 MG/ML
INJECTION, SOLUTION INTRAVASCULAR
Status: DISCONTINUED | OUTPATIENT
Start: 2024-09-12 | End: 2024-09-12 | Stop reason: HOSPADM

## 2024-09-12 RX ORDER — PROTAMINE SULFATE 10 MG/ML
INJECTION, SOLUTION INTRAVENOUS
Status: DISCONTINUED | OUTPATIENT
Start: 2024-09-12 | End: 2024-09-12 | Stop reason: HOSPADM

## 2024-09-12 RX ORDER — NICARDIPINE HCL-0.9% SOD CHLOR 1 MG/10 ML
SYRINGE (ML) INTRAVENOUS
Status: DISCONTINUED | OUTPATIENT
Start: 2024-09-12 | End: 2024-09-12 | Stop reason: HOSPADM

## 2024-09-12 RX ORDER — ACETAMINOPHEN 325 MG/1
650 TABLET ORAL EVERY 4 HOURS PRN
Status: DISCONTINUED | OUTPATIENT
Start: 2024-09-12 | End: 2024-09-12 | Stop reason: HOSPADM

## 2024-09-12 RX ORDER — LIDOCAINE HYDROCHLORIDE 10 MG/ML
INJECTION, SOLUTION EPIDURAL; INFILTRATION; INTRACAUDAL; PERINEURAL
Status: DISCONTINUED | OUTPATIENT
Start: 2024-09-12 | End: 2024-09-12 | Stop reason: HOSPADM

## 2024-09-12 RX ORDER — SODIUM CHLORIDE 0.9 % (FLUSH) 0.9 %
10 SYRINGE (ML) INJECTION AS NEEDED
Status: DISCONTINUED | OUTPATIENT
Start: 2024-09-12 | End: 2024-09-12 | Stop reason: HOSPADM

## 2024-09-12 RX ORDER — MIDAZOLAM HYDROCHLORIDE 1 MG/ML
INJECTION INTRAMUSCULAR; INTRAVENOUS
Status: DISCONTINUED | OUTPATIENT
Start: 2024-09-12 | End: 2024-09-12 | Stop reason: HOSPADM

## 2024-09-12 RX ORDER — SODIUM CHLORIDE 9 MG/ML
5 INJECTION, SOLUTION INTRAVENOUS CONTINUOUS
Status: ACTIVE | OUTPATIENT
Start: 2024-09-12 | End: 2024-09-12

## 2024-09-12 RX ORDER — HEPARIN SODIUM 1000 [USP'U]/ML
INJECTION, SOLUTION INTRAVENOUS; SUBCUTANEOUS
Status: DISCONTINUED | OUTPATIENT
Start: 2024-09-12 | End: 2024-09-12 | Stop reason: HOSPADM

## 2024-09-12 RX ORDER — FENTANYL CITRATE 50 UG/ML
INJECTION, SOLUTION INTRAMUSCULAR; INTRAVENOUS
Status: DISCONTINUED | OUTPATIENT
Start: 2024-09-12 | End: 2024-09-12 | Stop reason: HOSPADM

## 2024-09-12 RX ORDER — SODIUM CHLORIDE 9 MG/ML
40 INJECTION, SOLUTION INTRAVENOUS AS NEEDED
Status: DISCONTINUED | OUTPATIENT
Start: 2024-09-12 | End: 2024-09-12 | Stop reason: HOSPADM

## 2024-09-12 RX ORDER — ASPIRIN 81 MG/1
81 TABLET ORAL DAILY
Status: DISCONTINUED | OUTPATIENT
Start: 2024-09-13 | End: 2024-09-12 | Stop reason: HOSPADM

## 2024-09-12 RX ORDER — PHENYLEPHRINE HYDROCHLORIDE 10 MG/ML
INJECTION INTRAVENOUS
Status: DISCONTINUED | OUTPATIENT
Start: 2024-09-12 | End: 2024-09-12 | Stop reason: HOSPADM

## 2024-09-12 RX ORDER — SODIUM CHLORIDE 0.9 % (FLUSH) 0.9 %
10 SYRINGE (ML) INJECTION EVERY 12 HOURS SCHEDULED
Status: DISCONTINUED | OUTPATIENT
Start: 2024-09-12 | End: 2024-09-12 | Stop reason: HOSPADM

## 2024-09-12 RX ORDER — ASPIRIN 81 MG/1
324 TABLET, CHEWABLE ORAL ONCE
Status: COMPLETED | OUTPATIENT
Start: 2024-09-12 | End: 2024-09-12

## 2024-09-12 RX ORDER — SODIUM NITROPRUSSIDE 25 MG/ML
INJECTION INTRAVENOUS
Status: DISCONTINUED | OUTPATIENT
Start: 2024-09-12 | End: 2024-09-12 | Stop reason: HOSPADM

## 2024-09-12 RX ORDER — NITROGLYCERIN 0.4 MG/1
0.4 TABLET SUBLINGUAL
Status: DISCONTINUED | OUTPATIENT
Start: 2024-09-12 | End: 2024-09-12 | Stop reason: HOSPADM

## 2024-09-12 RX ADMIN — Medication 10 ML: at 09:38

## 2024-09-12 RX ADMIN — SODIUM CHLORIDE 234.9 ML: 9 INJECTION, SOLUTION INTRAVENOUS at 09:38

## 2024-09-12 RX ADMIN — ASPIRIN 81 MG 324 MG: 81 TABLET ORAL at 09:37

## 2024-09-12 NOTE — Clinical Note
Hemostasis started on the right femoral artery. Perclose was used in achieving hemostasis. Closure device deployed in the vessel. Hemostasis was not achieved successfully.

## 2024-09-12 NOTE — Clinical Note
A 5 fr sheath was successfully inserted using micropuncture technique with ultrasound guidance into the right femoral vein. Sheath insertion not delayed.

## 2024-09-12 NOTE — CONSULTS
Diabetes Education    Patient Name:  Albin Andino  YOB: 1940  MRN: [id number]  Admit Date:  9/12/2024        Consult received for diabetes education. Chart reviewed. No history of diabetes. Current A1c not available. Last A1c noted was 4.5% in 2016. Please reconsult as needed.       Electronically signed by:  Niya Kingsley RN  09/12/24 16:48 EDT

## 2024-09-12 NOTE — Clinical Note
First balloon inflation max pressure = 18 pako. First balloon inflation duration = 60 seconds. Second inflation of balloon - Max pressure = 18 pako. 2nd Inflation of balloon - Duration = 10 seconds. Third inflation of balloon - Max pressure = 18 pako. 3rd Inflation of balloon - Duration = 15 seconds.

## 2024-09-12 NOTE — Clinical Note
Hemostasis started on the right femoral vein. Vascade MVP was used in achieving hemostasis. Closure device deployed in the vessel. Hemostasis was not achieved successfully.

## 2024-09-12 NOTE — INTERVAL H&P NOTE
H&P reviewed. The patient was examined and there are no changes to the H&P.        No changes physical exam      Active Hospital Problems    Diagnosis     **Coronary artery disease involving native coronary artery of native heart with unstable angina pectoris      CABG in the 1980s  PCI 2006 - 2007  Cardiac catheterization (9/6/2024): Occluded native circulation.  Patent LIMA to LAD.  Severely degenerated SVG to RCA and SVG to OM      Essential hypertension      Target blood pressure <130/80 mmHg      Hyperlipidemia LDL goal <70    Patient presents today to undergo high risk PCI of the SVG to the RCA and possibly PCI of the SVG to an OM branch.  The risks and benefits of the procedure were discussed and the patient is agreeable to proceed.  When seen in the office patient was prescribed 600 mg of Plavix x 1 followed by 75 mg daily thereafter.  He has been compliant.  He has also been taking aspirin 81 mg daily.  He has been premedicated 324 mg aspirin today.    Plan:  Proceed with high risk PCI  Further recommendations to follow      ABDIAZIZ Stover

## 2024-09-13 ENCOUNTER — CALL CENTER PROGRAMS (OUTPATIENT)
Dept: CALL CENTER | Facility: HOSPITAL | Age: 84
End: 2024-09-13
Payer: MEDICARE

## 2024-09-13 ENCOUNTER — DOCUMENTATION (OUTPATIENT)
Dept: CARDIAC REHAB | Facility: HOSPITAL | Age: 84
End: 2024-09-13
Payer: MEDICARE

## 2024-09-13 NOTE — PROGRESS NOTES
Cardiac Rehab staff mailed referral letter to patient regarding Phase II Cardiac Rehab program. Instruction for patient to contact [facility] Cardiac Rehab Department for additional program information and to forward referral to closest Cardiac Rehab program.

## 2024-09-13 NOTE — OUTREACH NOTE
PCI/Device Survey      Flowsheet Row Responses   Facility patient discharged from? [place]   Procedure date 09/12/24   Procedure (if device, specify in description) PCI   PCI site Right, Groin   Performing MD Dr. Niels Cruz   Attempt successful? No   Unsuccessful attempts Attempt 1            Jessica KUMAR - Registered Nurse

## 2024-09-13 NOTE — OUTREACH NOTE
PCI/Device Survey      Flowsheet Row Responses   Facility patient discharged from? [place]   Procedure date 09/12/24   Procedure (if device, specify in description) PCI   PCI site Right, Groin   Performing MD Dr. Niels Cruz   Attempt successful? No   Unsuccessful attempts Attempt 2            Jessica KUMAR - Registered Nurse

## 2024-09-18 ENCOUNTER — TRANSCRIBE ORDERS (OUTPATIENT)
Dept: CARDIAC REHAB | Facility: HOSPITAL | Age: 84
End: 2024-09-18
Payer: MEDICARE

## 2024-09-18 DIAGNOSIS — Z95.5 STENTED CORONARY ARTERY: Primary | ICD-10-CM

## 2024-09-23 ENCOUNTER — TELEPHONE (OUTPATIENT)
Dept: CARDIOLOGY | Facility: CLINIC | Age: 84
End: 2024-09-23
Payer: MEDICARE

## 2024-10-03 ENCOUNTER — TELEPHONE (OUTPATIENT)
Dept: CARDIOLOGY | Facility: CLINIC | Age: 84
End: 2024-10-03
Payer: MEDICARE

## 2024-10-03 NOTE — TELEPHONE ENCOUNTER
Name: ThuEdelmiraLoraine    Relationship: Emergency Contact    Best Callback Number: [phone]    Incoming call to the Hub, requesting to  Reschedule their HFU appointment on 10.15.24.     Per Hub workflow, further review is needed before the task can be completed.    Result of Call: Please reach out to the patient to reschedule

## 2024-10-14 NOTE — PROGRESS NOTES
"    Cardiology Outpatient Visit      Identification: Albin Andino is a 84 y.o. male who resides in [place], [place].     Reason for visit:  Follow-up PCI      Subjective      Mr. Andino returns to the office today 1 month after undergoing high risk PCI of degenerated vein grafts.  The procedure went quite well and since his procedure he has had no longer had any anginal symptoms.  He is tolerating his medicines without side effect.    Review of Systems   Cardiovascular:  Negative for chest pain, claudication, cyanosis, dyspnea on exertion, irregular heartbeat, leg swelling, near-syncope, orthopnea, palpitations, paroxysmal nocturnal dyspnea and syncope.   Respiratory:  Negative for cough, hemoptysis, shortness of breath, sleep disturbances due to breathing, snoring, sputum production and wheezing.        Allergies   Allergen Reactions    Hydrocodone Confusion     Pt reports that it \"makes him go crazy\"    Hydrocodone-Acetaminophen Confusion and Other (See Comments)    Morphine And Codeine Other (See Comments)         Current Outpatient Medications   Medication Instructions    aspirin 81 mg, Oral, Daily    atorvastatin (LIPITOR) 20 mg, Oral, Nightly    benazepril (LOTENSIN) 2.5 mg, Oral, Daily    cholecalciferol (VITAMIN D3) 1,000 Units, 2 Times Daily    clopidogrel (PLAVIX) 75 mg, Oral, Daily    cyanocobalamin (VITAMIN B-12) 500 mcg, Daily    folic acid (FOLVITE) 400 mcg, Daily    gabapentin (NEURONTIN) 600 MG tablet take 1 tablet by mouth every morning and take 2 tablets by mouth every evening    Linzess 72 MCG capsule capsule 1 capsule, Daily    metoprolol succinate XL (TOPROL-XL) 25 mg, Oral, Daily    Multiple Vitamins-Minerals (ICAPS AREDS 2) capsule 1 capsule, 2 Times Daily    nitroglycerin (NITROSTAT) 0.4 mg, Oral, Every 5 Minutes PRN    pantoprazole (PROTONIX) 40 MG EC tablet 1 tablet, 2 Times Daily    vitamin B-6 (PYRIDOXINE) 100 mg, Daily    Vitamin C 500 mg, 2 Times Daily         Objective     /68 " "(BP Location: Left arm, Patient Position: Sitting, Cuff Size: Adult)   Pulse 55   Ht 182.9 cm (72\")   Wt 79.8 kg (176 lb)   SpO2 97%   BMI 23.87 kg/m²       Constitutional:       Appearance: Healthy appearance.   Eyes:      General: No scleral icterus.  Neck:      Thyroid: No thyroid mass.      Vascular: No carotid bruit or JVD. JVD normal.   Pulmonary:      Effort: Pulmonary effort is normal.      Breath sounds: Normal breath sounds.   Cardiovascular:      Normal rate. Regular rhythm.      Murmurs: There is no murmur.      No gallop.    Edema:     Peripheral edema absent.   Skin:     General: Skin is warm. There is no cyanosis.   Neurological:      General: No focal deficit present.      Mental Status: Alert.   Psychiatric:         Attention and Perception: Attention normal.         Result Review  (reviewed with patient):            Lab Results   Component Value Date    GLUCOSE 95 09/12/2024    BUN 14 09/12/2024    CREATININE 0.97 09/12/2024     09/12/2024    K 4.1 09/12/2024     (H) 09/12/2024    CALCIUM 9.1 09/12/2024    PROTEINTOT 5.7 (L) 09/12/2024    ALBUMIN 4.1 09/12/2024    ALT 16 09/12/2024    AST 20 09/12/2024    ALKPHOS 75 09/12/2024    BILITOT 0.8 09/12/2024    GLOB 1.6 09/12/2024    AGRATIO 2.6 09/12/2024    BCR 14.4 09/12/2024    ANIONGAP 11.0 09/12/2024    EGFR 76.0 09/12/2024     Lab Results   Component Value Date    WBC 6.56 09/12/2024    HGB 12.5 (L) 09/12/2024    HCT 37.3 (L) 09/12/2024    MCV 92.1 09/12/2024     09/12/2024     Lab Results   Component Value Date    CHOL 96 08/28/2016    CHLPL 138 06/15/2020    TRIG 121 06/15/2020    HDL 41 06/15/2020    LDL 73 06/15/2020     Lab Results   Component Value Date    HGBA1C 4.50 08/28/2016           Assessment     Diagnoses and all orders for this visit:    1. Coronary artery disease involving native coronary artery of native heart with angina pectoris (Primary)  Overview:  CABG in the 1980s  PCI 2006 - 2007  Cardiac " catheterization (9/6/2024): Occluded native circulation.  Patent LIMA to LAD.  Severely degenerated SVG to RCA and SVG to OM  Cardiac catheterization (9/16/2024): Successful high risk PCI of degenerated sequential SVG to RPDA/RPL--proximal and distal segments--using large-caliber drug-eluting stents.  Residual stenosis of vein graft supplying RPL not treated. Successful high risk PCI of degenerated SVG to OM with large caliber YAMILETH    Assessment & Plan:  Complete resolution of chest pain following PCI  Continue DAPT, beta-blocker, statin  Patient may try with holding isosorbide mononitrate and resume only if anginal symptoms are present    Orders:  -     aspirin 81 MG EC tablet; Take 1 tablet by mouth Daily.  Dispense: 90 tablet; Refill: 3  -     clopidogrel (PLAVIX) 75 MG tablet; Take 1 tablet by mouth Daily.  Dispense: 90 tablet; Refill: 3  -     metoprolol succinate XL (TOPROL-XL) 25 MG 24 hr tablet; Take 1 tablet by mouth Daily.  Dispense: 90 tablet; Refill: 3  -     nitroglycerin (NITROSTAT) 0.4 MG SL tablet; Take 1 tablet by mouth Every 5 (Five) Minutes As Needed for Chest Pain.    2. Carotid stenosis, symptomatic w/o infarct, bilateral    3. Hyperlipidemia LDL goal <70  Assessment & Plan:  Obtain direct LDL  Uptitrate atorvastatin if LDL >70    Orders:  -     atorvastatin (LIPITOR) 20 MG tablet; Take 1 tablet by mouth Every Night.  Dispense: 90 tablet; Refill: 3          Plan   Trial of with holding isosorbide mononitrate.  Resume if anginal symptoms recur  Otherwise continue present medical therapy      Follow-up   Return in about 6 months (around 4/15/2025) for Follow-up with [facility] only.        John Cruz MD, FACC, [facility]  10/15/2024

## 2024-10-15 ENCOUNTER — OFFICE VISIT (OUTPATIENT)
Dept: CARDIOLOGY | Facility: CLINIC | Age: 84
End: 2024-10-15
Payer: MEDICARE

## 2024-10-15 VITALS
SYSTOLIC BLOOD PRESSURE: 134 MMHG | DIASTOLIC BLOOD PRESSURE: 68 MMHG | BODY MASS INDEX: 23.84 KG/M2 | HEIGHT: 72 IN | WEIGHT: 176 LBS | HEART RATE: 55 BPM | OXYGEN SATURATION: 97 %

## 2024-10-15 DIAGNOSIS — I25.119 CORONARY ARTERY DISEASE INVOLVING NATIVE CORONARY ARTERY OF NATIVE HEART WITH ANGINA PECTORIS: Primary | ICD-10-CM

## 2024-10-15 DIAGNOSIS — I65.23 CAROTID STENOSIS, SYMPTOMATIC W/O INFARCT, BILATERAL: ICD-10-CM

## 2024-10-15 DIAGNOSIS — E78.5 HYPERLIPIDEMIA LDL GOAL <70: ICD-10-CM

## 2024-10-15 PROCEDURE — 1159F MED LIST DOCD IN RCRD: CPT | Performed by: INTERNAL MEDICINE

## 2024-10-15 PROCEDURE — 99214 OFFICE O/P EST MOD 30 MIN: CPT | Performed by: INTERNAL MEDICINE

## 2024-10-15 PROCEDURE — 1160F RVW MEDS BY RX/DR IN RCRD: CPT | Performed by: INTERNAL MEDICINE

## 2024-10-15 RX ORDER — METOPROLOL SUCCINATE 25 MG/1
25 TABLET, EXTENDED RELEASE ORAL DAILY
Qty: 90 TABLET | Refills: 3 | Status: SHIPPED | OUTPATIENT
Start: 2024-10-15

## 2024-10-15 RX ORDER — ATORVASTATIN CALCIUM 20 MG/1
20 TABLET, FILM COATED ORAL NIGHTLY
Qty: 90 TABLET | Refills: 3 | Status: SHIPPED | OUTPATIENT
Start: 2024-10-15

## 2024-10-15 RX ORDER — NITROGLYCERIN 0.4 MG/1
0.4 TABLET SUBLINGUAL
Start: 2024-10-15

## 2024-10-15 RX ORDER — ASPIRIN 81 MG/1
81 TABLET ORAL DAILY
Qty: 90 TABLET | Refills: 3 | Status: SHIPPED | OUTPATIENT
Start: 2024-10-15

## 2024-10-15 RX ORDER — CLOPIDOGREL BISULFATE 75 MG/1
75 TABLET ORAL DAILY
Qty: 90 TABLET | Refills: 3 | Status: SHIPPED | OUTPATIENT
Start: 2024-10-15

## 2024-10-15 NOTE — ASSESSMENT & PLAN NOTE
Complete resolution of chest pain following PCI  Continue DAPT, beta-blocker, statin  Patient may try with holding isosorbide mononitrate and resume only if anginal symptoms are present

## 2024-10-15 NOTE — LETTER
"October 15, 2024     Ariela Brannon DO  [address] [facility] [address]    Patient: Albin Andino   YOB: 1940   Date of Visit: 10/15/2024     Dear Ariela Brannon DO:       Thank you for referring Albin Andino to me for evaluation. Below are the relevant portions of my assessment and plan of care.    If you have questions, please do not hesitate to call me. I look forward to following Albin along with you.         Sincerely,        Niels Cruz IV, MD        CC: No Recipients    Niels Cruz IV, MD  10/15/24 1102  Signed      Cardiology Outpatient Visit      Identification: Albin Andino is a 84 y.o. male who resides in [place], [place].     Reason for visit:  Follow-up PCI      Subjective     Mr. Andino returns to the office today 1 month after undergoing high risk PCI of degenerated vein grafts.  The procedure went quite well and since his procedure he has had no longer had any anginal symptoms.  He is tolerating his medicines without side effect.    Review of Systems   Cardiovascular:  Negative for chest pain, claudication, cyanosis, dyspnea on exertion, irregular heartbeat, leg swelling, near-syncope, orthopnea, palpitations, paroxysmal nocturnal dyspnea and syncope.   Respiratory:  Negative for cough, hemoptysis, shortness of breath, sleep disturbances due to breathing, snoring, sputum production and wheezing.        Allergies   Allergen Reactions   • Hydrocodone Confusion     Pt reports that it \"makes him go crazy\"   • Hydrocodone-Acetaminophen Confusion and Other (See Comments)   • Morphine And Codeine Other (See Comments)         Current Outpatient Medications   Medication Instructions   • aspirin 81 mg, Oral, Daily   • atorvastatin (LIPITOR) 20 mg, Oral, Nightly   • benazepril (LOTENSIN) 2.5 mg, Oral, Daily   • cholecalciferol (VITAMIN D3) 1,000 Units, 2 Times Daily   • clopidogrel (PLAVIX) 75 mg, Oral, Daily   • cyanocobalamin (VITAMIN " "B-12) 500 mcg, Daily   • folic acid (FOLVITE) 400 mcg, Daily   • gabapentin (NEURONTIN) 600 MG tablet take 1 tablet by mouth every morning and take 2 tablets by mouth every evening   • Linzess 72 MCG capsule capsule 1 capsule, Daily   • metoprolol succinate XL (TOPROL-XL) 25 mg, Oral, Daily   • Multiple Vitamins-Minerals (ICAPS AREDS 2) capsule 1 capsule, 2 Times Daily   • nitroglycerin (NITROSTAT) 0.4 mg, Oral, Every 5 Minutes PRN   • pantoprazole (PROTONIX) 40 MG EC tablet 1 tablet, 2 Times Daily   • vitamin B-6 (PYRIDOXINE) 100 mg, Daily   • Vitamin C 500 mg, 2 Times Daily         Objective    /68 (BP Location: Left arm, Patient Position: Sitting, Cuff Size: Adult)   Pulse 55   Ht 182.9 cm (72\")   Wt 79.8 kg (176 lb)   SpO2 97%   BMI 23.87 kg/m²       Constitutional:       Appearance: Healthy appearance.   Eyes:      General: No scleral icterus.  Neck:      Thyroid: No thyroid mass.      Vascular: No carotid bruit or JVD. JVD normal.   Pulmonary:      Effort: Pulmonary effort is normal.      Breath sounds: Normal breath sounds.   Cardiovascular:      Normal rate. Regular rhythm.      Murmurs: There is no murmur.      No gallop.    Edema:     Peripheral edema absent.   Skin:     General: Skin is warm. There is no cyanosis.   Neurological:      General: No focal deficit present.      Mental Status: Alert.   Psychiatric:         Attention and Perception: Attention normal.         Result Review (reviewed with patient):            Lab Results   Component Value Date    GLUCOSE 95 09/12/2024    BUN 14 09/12/2024    CREATININE 0.97 09/12/2024     09/12/2024    K 4.1 09/12/2024     (H) 09/12/2024    CALCIUM 9.1 09/12/2024    PROTEINTOT 5.7 (L) 09/12/2024    ALBUMIN 4.1 09/12/2024    ALT 16 09/12/2024    AST 20 09/12/2024    ALKPHOS 75 09/12/2024    BILITOT 0.8 09/12/2024    GLOB 1.6 09/12/2024    AGRATIO 2.6 09/12/2024    BCR 14.4 09/12/2024    ANIONGAP 11.0 09/12/2024    EGFR 76.0 09/12/2024 "     Lab Results   Component Value Date    WBC 6.56 09/12/2024    HGB 12.5 (L) 09/12/2024    HCT 37.3 (L) 09/12/2024    MCV 92.1 09/12/2024     09/12/2024     Lab Results   Component Value Date    CHOL 96 08/28/2016    CHLPL 138 06/15/2020    TRIG 121 06/15/2020    HDL 41 06/15/2020    LDL 73 06/15/2020     Lab Results   Component Value Date    HGBA1C 4.50 08/28/2016           Assessment    Diagnoses and all orders for this visit:    1. Coronary artery disease involving native coronary artery of native heart with angina pectoris (Primary)  Overview:  CABG in the 1980s  PCI 2006 - 2007  Cardiac catheterization (9/6/2024): Occluded native circulation.  Patent LIMA to LAD.  Severely degenerated SVG to RCA and SVG to OM  Cardiac catheterization (9/16/2024): Successful high risk PCI of degenerated sequential SVG to RPDA/RPL--proximal and distal segments--using large-caliber drug-eluting stents.  Residual stenosis of vein graft supplying RPL not treated. Successful high risk PCI of degenerated SVG to OM with large caliber YAMILETH    Assessment & Plan:  Complete resolution of chest pain following PCI  Continue DAPT, beta-blocker, statin  Patient may try with holding isosorbide mononitrate and resume only if anginal symptoms are present    Orders:  -     aspirin 81 MG EC tablet; Take 1 tablet by mouth Daily.  Dispense: 90 tablet; Refill: 3  -     clopidogrel (PLAVIX) 75 MG tablet; Take 1 tablet by mouth Daily.  Dispense: 90 tablet; Refill: 3  -     metoprolol succinate XL (TOPROL-XL) 25 MG 24 hr tablet; Take 1 tablet by mouth Daily.  Dispense: 90 tablet; Refill: 3  -     nitroglycerin (NITROSTAT) 0.4 MG SL tablet; Take 1 tablet by mouth Every 5 (Five) Minutes As Needed for Chest Pain.    2. Carotid stenosis, symptomatic w/o infarct, bilateral    3. Hyperlipidemia LDL goal <70  Assessment & Plan:  Obtain direct LDL  Uptitrate atorvastatin if LDL >70    Orders:  -     atorvastatin (LIPITOR) 20 MG tablet; Take 1 tablet by  mouth Every Night.  Dispense: 90 tablet; Refill: 3          Plan  Trial of with holding isosorbide mononitrate.  Resume if anginal symptoms recur  Otherwise continue present medical therapy      Follow-up   Return in about 6 months (around 4/15/2025) for Follow-up with [facility] only.        John Cruz MD, FACC, [facility]  10/15/2024

## 2024-12-21 ENCOUNTER — APPOINTMENT (OUTPATIENT)
Dept: OTHER | Facility: HOSPITAL | Age: 84
End: 2024-12-21
Payer: MEDICARE

## 2024-12-21 ENCOUNTER — HOSPITAL ENCOUNTER (INPATIENT)
Facility: HOSPITAL | Age: 84
LOS: 2 days | Discharge: HOME | End: 2024-12-23
Attending: STUDENT IN AN ORGANIZED HEALTH CARE EDUCATION/TRAINING PROGRAM | Admitting: INTERNAL MEDICINE
Payer: MEDICARE

## 2024-12-21 DIAGNOSIS — I50.22 CHRONIC HFREF (HEART FAILURE WITH REDUCED EJECTION FRACTION): Primary | ICD-10-CM

## 2024-12-21 DIAGNOSIS — I21.4 NSTEMI (NON-ST ELEVATED MYOCARDIAL INFARCTION): ICD-10-CM

## 2024-12-21 DIAGNOSIS — D69.6 THROMBOCYTOPENIA: ICD-10-CM

## 2024-12-21 DIAGNOSIS — K22.2 ESOPHAGEAL STRICTURE: ICD-10-CM

## 2024-12-21 DIAGNOSIS — J10.00 PNEUMONIA DUE TO INFLUENZA A VIRUS: ICD-10-CM

## 2024-12-21 PROBLEM — R79.89 TROPONIN LEVEL ELEVATED: Status: ACTIVE | Noted: 2024-12-21

## 2024-12-21 PROBLEM — J18.9 PNEUMONIA: Status: ACTIVE | Noted: 2024-12-21

## 2024-12-21 PROCEDURE — 84484 ASSAY OF TROPONIN QUANT: CPT | Performed by: INTERNAL MEDICINE

## 2024-12-21 PROCEDURE — 84145 PROCALCITONIN (PCT): CPT | Performed by: INTERNAL MEDICINE

## 2024-12-21 PROCEDURE — 85520 HEPARIN ASSAY: CPT | Performed by: INTERNAL MEDICINE

## 2024-12-21 PROCEDURE — 85025 COMPLETE CBC W/AUTO DIFF WBC: CPT | Performed by: INTERNAL MEDICINE

## 2024-12-21 PROCEDURE — 25010000002 CEFTRIAXONE PER 250 MG: Performed by: INTERNAL MEDICINE

## 2024-12-21 PROCEDURE — 82533 TOTAL CORTISOL: CPT | Performed by: INTERNAL MEDICINE

## 2024-12-21 PROCEDURE — 80053 COMPREHEN METABOLIC PANEL: CPT | Performed by: INTERNAL MEDICINE

## 2024-12-21 PROCEDURE — 99223 1ST HOSP IP/OBS HIGH 75: CPT | Performed by: INTERNAL MEDICINE

## 2024-12-21 PROCEDURE — 83605 ASSAY OF LACTIC ACID: CPT | Performed by: INTERNAL MEDICINE

## 2024-12-21 RX ORDER — ASCORBIC ACID 500 MG
500 TABLET ORAL DAILY
Status: DISCONTINUED | OUTPATIENT
Start: 2024-12-22 | End: 2024-12-23 | Stop reason: HOSPADM

## 2024-12-21 RX ORDER — CHOLECALCIFEROL (VITAMIN D3) 25 MCG
1000 TABLET ORAL DAILY
Status: DISCONTINUED | OUTPATIENT
Start: 2024-12-22 | End: 2024-12-23 | Stop reason: HOSPADM

## 2024-12-21 RX ORDER — CLOPIDOGREL BISULFATE 75 MG/1
75 TABLET ORAL DAILY
Status: DISCONTINUED | OUTPATIENT
Start: 2024-12-22 | End: 2024-12-23 | Stop reason: HOSPADM

## 2024-12-21 RX ORDER — PANTOPRAZOLE SODIUM 40 MG/1
40 TABLET, DELAYED RELEASE ORAL 2 TIMES DAILY
Status: DISCONTINUED | OUTPATIENT
Start: 2024-12-22 | End: 2024-12-23 | Stop reason: HOSPADM

## 2024-12-21 RX ORDER — LISINOPRIL 2.5 MG/1
2.5 TABLET ORAL
Status: DISCONTINUED | OUTPATIENT
Start: 2024-12-22 | End: 2024-12-23 | Stop reason: HOSPADM

## 2024-12-21 RX ORDER — MULTIVIT AND MINERALS-FERROUS GLUCONATE 9 MG IRON/15 ML ORAL LIQUID 9 MG/15 ML
15 LIQUID (ML) ORAL DAILY
Status: DISCONTINUED | OUTPATIENT
Start: 2024-12-22 | End: 2024-12-23 | Stop reason: HOSPADM

## 2024-12-21 RX ORDER — IPRATROPIUM BROMIDE AND ALBUTEROL SULFATE 2.5; .5 MG/3ML; MG/3ML
3 SOLUTION RESPIRATORY (INHALATION)
Status: DISCONTINUED | OUTPATIENT
Start: 2024-12-22 | End: 2024-12-23 | Stop reason: HOSPADM

## 2024-12-21 RX ORDER — METHYLPREDNISOLONE SODIUM SUCCINATE 40 MG/ML
40 INJECTION, POWDER, LYOPHILIZED, FOR SOLUTION INTRAMUSCULAR; INTRAVENOUS ONCE
Status: COMPLETED | OUTPATIENT
Start: 2024-12-22 | End: 2024-12-22

## 2024-12-21 RX ORDER — LANOLIN ALCOHOL/MO/W.PET/CERES
500 CREAM (GRAM) TOPICAL DAILY
Status: DISCONTINUED | OUTPATIENT
Start: 2024-12-22 | End: 2024-12-23 | Stop reason: HOSPADM

## 2024-12-21 RX ORDER — ASPIRIN 81 MG/1
81 TABLET ORAL DAILY
Status: DISCONTINUED | OUTPATIENT
Start: 2024-12-22 | End: 2024-12-23 | Stop reason: HOSPADM

## 2024-12-21 RX ORDER — ATORVASTATIN CALCIUM 20 MG/1
20 TABLET, FILM COATED ORAL NIGHTLY
Status: DISCONTINUED | OUTPATIENT
Start: 2024-12-22 | End: 2024-12-23 | Stop reason: HOSPADM

## 2024-12-21 RX ORDER — LANOLIN ALCOHOL/MO/W.PET/CERES
100 CREAM (GRAM) TOPICAL DAILY
Status: DISCONTINUED | OUTPATIENT
Start: 2024-12-22 | End: 2024-12-23 | Stop reason: HOSPADM

## 2024-12-21 RX ORDER — METOPROLOL SUCCINATE 25 MG/1
25 TABLET, EXTENDED RELEASE ORAL DAILY
Status: DISCONTINUED | OUTPATIENT
Start: 2024-12-22 | End: 2024-12-23 | Stop reason: HOSPADM

## 2024-12-21 RX ORDER — LUBIPROSTONE 24 UG/1
24 CAPSULE ORAL 2 TIMES DAILY
Status: DISCONTINUED | OUTPATIENT
Start: 2024-12-22 | End: 2024-12-23 | Stop reason: HOSPADM

## 2024-12-21 RX ORDER — ACETAMINOPHEN 325 MG/1
650 TABLET ORAL EVERY 6 HOURS PRN
Status: DISCONTINUED | OUTPATIENT
Start: 2024-12-21 | End: 2024-12-23 | Stop reason: HOSPADM

## 2024-12-21 RX ORDER — FOLIC ACID 0.4 MG
400 TABLET ORAL DAILY
Status: DISCONTINUED | OUTPATIENT
Start: 2024-12-22 | End: 2024-12-23 | Stop reason: HOSPADM

## 2024-12-21 RX ORDER — GABAPENTIN 400 MG/1
400 CAPSULE ORAL EVERY 8 HOURS SCHEDULED
Status: DISCONTINUED | OUTPATIENT
Start: 2024-12-22 | End: 2024-12-22

## 2024-12-21 RX ORDER — NITROGLYCERIN 0.4 MG/1
0.4 TABLET SUBLINGUAL
Status: DISCONTINUED | OUTPATIENT
Start: 2024-12-21 | End: 2024-12-23 | Stop reason: HOSPADM

## 2024-12-21 RX ORDER — GUAIFENESIN 200 MG/10ML
200 LIQUID ORAL EVERY 4 HOURS PRN
Status: DISCONTINUED | OUTPATIENT
Start: 2024-12-21 | End: 2024-12-23 | Stop reason: HOSPADM

## 2024-12-21 RX ADMIN — PANTOPRAZOLE SODIUM 40 MG: 40 TABLET, DELAYED RELEASE ORAL at 23:46

## 2024-12-21 RX ADMIN — SODIUM CHLORIDE 1000 MG: 900 INJECTION INTRAVENOUS at 23:37

## 2024-12-21 RX ADMIN — ATORVASTATIN CALCIUM 20 MG: 20 TABLET, FILM COATED ORAL at 23:46

## 2024-12-21 RX ADMIN — GABAPENTIN 400 MG: 400 CAPSULE ORAL at 23:46

## 2024-12-22 ENCOUNTER — APPOINTMENT (OUTPATIENT)
Dept: CARDIOLOGY | Facility: HOSPITAL | Age: 84
End: 2024-12-22
Payer: MEDICARE

## 2024-12-22 ENCOUNTER — APPOINTMENT (OUTPATIENT)
Dept: CT IMAGING | Facility: HOSPITAL | Age: 84
End: 2024-12-22
Payer: MEDICARE

## 2024-12-22 ENCOUNTER — APPOINTMENT (OUTPATIENT)
Dept: GENERAL RADIOLOGY | Facility: HOSPITAL | Age: 84
End: 2024-12-22
Payer: MEDICARE

## 2024-12-22 LAB
ALBUMIN SERPL-MCNC: 4.1 G/DL (ref 3.5–5.2)
ALBUMIN/GLOB SERPL: 1.6 G/DL
ALP SERPL-CCNC: 96 U/L (ref 39–117)
ALT SERPL W P-5'-P-CCNC: 21 U/L (ref 1–41)
ANION GAP SERPL CALCULATED.3IONS-SCNC: 11 MMOL/L (ref 5–15)
APTT PPP: 33 SECONDS (ref 60–90)
AST SERPL-CCNC: 42 U/L (ref 1–40)
AV MEAN PRESS GRAD SYS DOP V1V2: 5 MMHG
AV VMAX SYS DOP: 160 CM/SEC
BASOPHILS # BLD AUTO: 0 10*3/MM3 (ref 0–0.2)
BASOPHILS # BLD AUTO: 0 10*3/MM3 (ref 0–0.2)
BASOPHILS NFR BLD AUTO: 0 % (ref 0–1.5)
BASOPHILS NFR BLD AUTO: 0 % (ref 0–1.5)
BH CV ECHO MEAS - AO MAX PG: 10.2 MMHG
BH CV ECHO MEAS - AO ROOT DIAM: 2.9 CM
BH CV ECHO MEAS - AO V2 VTI: 32.5 CM
BH CV ECHO MEAS - AVA(I,D): 1.85 CM2
BH CV ECHO MEAS - EDV(CUBED): 117.6 ML
BH CV ECHO MEAS - EDV(MOD-SP2): 77.6 ML
BH CV ECHO MEAS - EDV(MOD-SP4): 137 ML
BH CV ECHO MEAS - EF(MOD-SP2): 50 %
BH CV ECHO MEAS - EF(MOD-SP4): 49.5 %
BH CV ECHO MEAS - ESV(CUBED): 54.9 ML
BH CV ECHO MEAS - ESV(MOD-SP2): 38.8 ML
BH CV ECHO MEAS - ESV(MOD-SP4): 69.2 ML
BH CV ECHO MEAS - FS: 22.4 %
BH CV ECHO MEAS - IVS/LVPW: 1 CM
BH CV ECHO MEAS - IVSD: 1.2 CM
BH CV ECHO MEAS - LA DIMENSION: 4.8 CM
BH CV ECHO MEAS - LAT PEAK E' VEL: 9.3 CM/SEC
BH CV ECHO MEAS - LV DIASTOLIC VOL/BSA (35-75): 56.4 CM2
BH CV ECHO MEAS - LV MASS(C)D: 226.4 GRAMS
BH CV ECHO MEAS - LV MAX PG: 3.1 MMHG
BH CV ECHO MEAS - LV MEAN PG: 2 MMHG
BH CV ECHO MEAS - LV SYSTOLIC VOL/BSA (12-30): 28.5 CM2
BH CV ECHO MEAS - LV V1 MAX: 88 CM/SEC
BH CV ECHO MEAS - LV V1 VTI: 19.1 CM
BH CV ECHO MEAS - LVIDD: 4.9 CM
BH CV ECHO MEAS - LVIDS: 3.8 CM
BH CV ECHO MEAS - LVOT AREA: 3.1 CM2
BH CV ECHO MEAS - LVOT DIAM: 2 CM
BH CV ECHO MEAS - LVPWD: 1.2 CM
BH CV ECHO MEAS - MED PEAK E' VEL: 6 CM/SEC
BH CV ECHO MEAS - MV A MAX VEL: 67.1 CM/SEC
BH CV ECHO MEAS - MV DEC SLOPE: 384 CM/SEC2
BH CV ECHO MEAS - MV DEC TIME: 0.21 SEC
BH CV ECHO MEAS - MV E MAX VEL: 76.5 CM/SEC
BH CV ECHO MEAS - MV E/A: 1.14
BH CV ECHO MEAS - MV MAX PG: 4 MMHG
BH CV ECHO MEAS - MV MEAN PG: 2 MMHG
BH CV ECHO MEAS - MV P1/2T: 77 MSEC
BH CV ECHO MEAS - MV V2 VTI: 32 CM
BH CV ECHO MEAS - MVA(P1/2T): 2.9 CM2
BH CV ECHO MEAS - MVA(VTI): 1.88 CM2
BH CV ECHO MEAS - RAP SYSTOLE: 3 MMHG
BH CV ECHO MEAS - RVSP: 8.3 MMHG
BH CV ECHO MEAS - SV(LVOT): 60 ML
BH CV ECHO MEAS - SV(MOD-SP2): 38.8 ML
BH CV ECHO MEAS - SV(MOD-SP4): 67.8 ML
BH CV ECHO MEAS - SVI(LVOT): 24.7 ML/M2
BH CV ECHO MEAS - SVI(MOD-SP2): 16 ML/M2
BH CV ECHO MEAS - SVI(MOD-SP4): 27.9 ML/M2
BH CV ECHO MEAS - TAPSE (>1.6): 2.02 CM
BH CV ECHO MEAS - TR MAX PG: 5.3 MMHG
BH CV ECHO MEAS - TR MAX VEL: 115 CM/SEC
BH CV ECHO MEASUREMENTS AVERAGE E/E' RATIO: 10
BH CV XLRA - RV BASE: 4.3 CM
BH CV XLRA - RV LENGTH: 6.5 CM
BH CV XLRA - RV MID: 3.6 CM
BH CV XLRA - TDI S': 7.8 CM/SEC
BILIRUB SERPL-MCNC: 1 MG/DL (ref 0–1.2)
BUN SERPL-MCNC: 12 MG/DL (ref 8–23)
BUN/CREAT SERPL: 16.2 (ref 7–25)
CALCIUM SPEC-SCNC: 8.9 MG/DL (ref 8.6–10.5)
CHLORIDE SERPL-SCNC: 101 MMOL/L (ref 98–107)
CO2 SERPL-SCNC: 28 MMOL/L (ref 22–29)
CORTIS SERPL-MCNC: 10.41 MCG/DL
CREAT SERPL-MCNC: 0.74 MG/DL (ref 0.76–1.27)
D-LACTATE SERPL-SCNC: 1.5 MMOL/L (ref 0.5–2)
DEPRECATED RDW RBC AUTO: 43.3 FL (ref 37–54)
DEPRECATED RDW RBC AUTO: 44.1 FL (ref 37–54)
EGFRCR SERPLBLD CKD-EPI 2021: 88.2 ML/MIN/1.73
EOSINOPHIL # BLD AUTO: 0.01 10*3/MM3 (ref 0–0.4)
EOSINOPHIL # BLD AUTO: 0.01 10*3/MM3 (ref 0–0.4)
EOSINOPHIL NFR BLD AUTO: 0.2 % (ref 0.3–6.2)
EOSINOPHIL NFR BLD AUTO: 0.3 % (ref 0.3–6.2)
ERYTHROCYTE [DISTWIDTH] IN BLOOD BY AUTOMATED COUNT: 13.7 % (ref 12.3–15.4)
ERYTHROCYTE [DISTWIDTH] IN BLOOD BY AUTOMATED COUNT: 13.7 % (ref 12.3–15.4)
FLUAV RNA RESP QL NAA+PROBE: DETECTED
FLUBV RNA RESP QL NAA+PROBE: NOT DETECTED
GEN 5 1HR TROPONIN T REFLEX: 219 NG/L
GLOBULIN UR ELPH-MCNC: 2.5 GM/DL
GLUCOSE BLDC GLUCOMTR-MCNC: 131 MG/DL (ref 70–130)
GLUCOSE SERPL-MCNC: 100 MG/DL (ref 65–99)
HCT VFR BLD AUTO: 38.6 % (ref 37.5–51)
HCT VFR BLD AUTO: 41.3 % (ref 37.5–51)
HGB BLD-MCNC: 13.1 G/DL (ref 13–17.7)
HGB BLD-MCNC: 13.9 G/DL (ref 13–17.7)
IMM GRANULOCYTES # BLD AUTO: 0.01 10*3/MM3 (ref 0–0.05)
IMM GRANULOCYTES # BLD AUTO: 0.01 10*3/MM3 (ref 0–0.05)
IMM GRANULOCYTES NFR BLD AUTO: 0.2 % (ref 0–0.5)
IMM GRANULOCYTES NFR BLD AUTO: 0.3 % (ref 0–0.5)
INR PPP: 1.09 (ref 0.89–1.12)
L PNEUMO1 AG UR QL IA: NEGATIVE
LEFT ATRIUM VOLUME INDEX: 19.8 ML/M2
LV EF BIPLANE MOD: 50.5 %
LYMPHOCYTES # BLD AUTO: 0.57 10*3/MM3 (ref 0.7–3.1)
LYMPHOCYTES # BLD AUTO: 0.64 10*3/MM3 (ref 0.7–3.1)
LYMPHOCYTES NFR BLD AUTO: 14.3 % (ref 19.6–45.3)
LYMPHOCYTES NFR BLD AUTO: 14.9 % (ref 19.6–45.3)
MCH RBC QN AUTO: 29.4 PG (ref 26.6–33)
MCH RBC QN AUTO: 29.6 PG (ref 26.6–33)
MCHC RBC AUTO-ENTMCNC: 33.7 G/DL (ref 31.5–35.7)
MCHC RBC AUTO-ENTMCNC: 33.9 G/DL (ref 31.5–35.7)
MCV RBC AUTO: 86.7 FL (ref 79–97)
MCV RBC AUTO: 87.9 FL (ref 79–97)
MONOCYTES # BLD AUTO: 0.52 10*3/MM3 (ref 0.1–0.9)
MONOCYTES # BLD AUTO: 0.54 10*3/MM3 (ref 0.1–0.9)
MONOCYTES NFR BLD AUTO: 12.1 % (ref 5–12)
MONOCYTES NFR BLD AUTO: 13.5 % (ref 5–12)
NEUTROPHILS NFR BLD AUTO: 2.86 10*3/MM3 (ref 1.7–7)
NEUTROPHILS NFR BLD AUTO: 3.12 10*3/MM3 (ref 1.7–7)
NEUTROPHILS NFR BLD AUTO: 71.6 % (ref 42.7–76)
NEUTROPHILS NFR BLD AUTO: 72.6 % (ref 42.7–76)
NRBC BLD AUTO-RTO: 0 /100 WBC (ref 0–0.2)
NRBC BLD AUTO-RTO: 0 /100 WBC (ref 0–0.2)
PLATELET # BLD AUTO: 115 10*3/MM3 (ref 140–450)
PLATELET # BLD AUTO: 98 10*3/MM3 (ref 140–450)
PMV BLD AUTO: 10.3 FL (ref 6–12)
PMV BLD AUTO: 10.5 FL (ref 6–12)
POTASSIUM SERPL-SCNC: 4.4 MMOL/L (ref 3.5–5.2)
PROCALCITONIN SERPL-MCNC: 0.09 NG/ML (ref 0–0.25)
PROT SERPL-MCNC: 6.6 G/DL (ref 6–8.5)
PROTHROMBIN TIME: 14.2 SECONDS (ref 12.2–14.5)
RBC # BLD AUTO: 4.45 10*6/MM3 (ref 4.14–5.8)
RBC # BLD AUTO: 4.7 10*6/MM3 (ref 4.14–5.8)
RSV RNA RESP QL NAA+PROBE: NOT DETECTED
S PNEUM AG SPEC QL LA: NEGATIVE
SARS-COV-2 RNA RESP QL NAA+PROBE: NOT DETECTED
SODIUM SERPL-SCNC: 140 MMOL/L (ref 136–145)
TROPONIN T % DELTA: -25 %
TROPONIN T NUMERIC DELTA: -74 NG/L
TROPONIN T SERPL HS-MCNC: 175 NG/L
TROPONIN T SERPL HS-MCNC: 293 NG/L
UFH PPP CHRO-ACNC: 0.1 IU/ML (ref 0.3–0.7)
UFH PPP CHRO-ACNC: 0.12 IU/ML (ref 0.3–0.7)
UFH PPP CHRO-ACNC: 0.33 IU/ML (ref 0.3–0.7)
WBC NRBC COR # BLD AUTO: 3.99 10*3/MM3 (ref 3.4–10.8)
WBC NRBC COR # BLD AUTO: 4.3 10*3/MM3 (ref 3.4–10.8)

## 2024-12-22 PROCEDURE — 87637 SARSCOV2&INF A&B&RSV AMP PRB: CPT | Performed by: INTERNAL MEDICINE

## 2024-12-22 PROCEDURE — 85025 COMPLETE CBC W/AUTO DIFF WBC: CPT | Performed by: INTERNAL MEDICINE

## 2024-12-22 PROCEDURE — 87070 CULTURE OTHR SPECIMN AEROBIC: CPT | Performed by: INTERNAL MEDICINE

## 2024-12-22 PROCEDURE — 94761 N-INVAS EAR/PLS OXIMETRY MLT: CPT

## 2024-12-22 PROCEDURE — 97162 PT EVAL MOD COMPLEX 30 MIN: CPT

## 2024-12-22 PROCEDURE — 82948 REAGENT STRIP/BLOOD GLUCOSE: CPT

## 2024-12-22 PROCEDURE — 25810000003 SODIUM CHLORIDE 0.9 % SOLUTION 250 ML FLEX CONT: Performed by: INTERNAL MEDICINE

## 2024-12-22 PROCEDURE — 87449 NOS EACH ORGANISM AG IA: CPT | Performed by: INTERNAL MEDICINE

## 2024-12-22 PROCEDURE — 25010000002 METHYLPREDNISOLONE PER 40 MG: Performed by: INTERNAL MEDICINE

## 2024-12-22 PROCEDURE — 87205 SMEAR GRAM STAIN: CPT | Performed by: INTERNAL MEDICINE

## 2024-12-22 PROCEDURE — 97110 THERAPEUTIC EXERCISES: CPT

## 2024-12-22 PROCEDURE — 25010000002 AZITHROMYCIN PER 500 MG: Performed by: INTERNAL MEDICINE

## 2024-12-22 PROCEDURE — 25010000002 CEFTRIAXONE PER 250 MG: Performed by: INTERNAL MEDICINE

## 2024-12-22 PROCEDURE — 94664 DEMO&/EVAL PT USE INHALER: CPT

## 2024-12-22 PROCEDURE — 85520 HEPARIN ASSAY: CPT

## 2024-12-22 PROCEDURE — 94799 UNLISTED PULMONARY SVC/PX: CPT

## 2024-12-22 PROCEDURE — 25010000002 FUROSEMIDE PER 20 MG: Performed by: INTERNAL MEDICINE

## 2024-12-22 PROCEDURE — 97530 THERAPEUTIC ACTIVITIES: CPT

## 2024-12-22 PROCEDURE — 85610 PROTHROMBIN TIME: CPT | Performed by: INTERNAL MEDICINE

## 2024-12-22 PROCEDURE — 84484 ASSAY OF TROPONIN QUANT: CPT | Performed by: INTERNAL MEDICINE

## 2024-12-22 PROCEDURE — 99221 1ST HOSP IP/OBS SF/LOW 40: CPT | Performed by: INTERNAL MEDICINE

## 2024-12-22 PROCEDURE — 94640 AIRWAY INHALATION TREATMENT: CPT

## 2024-12-22 PROCEDURE — 97116 GAIT TRAINING THERAPY: CPT

## 2024-12-22 PROCEDURE — 71260 CT THORAX DX C+: CPT

## 2024-12-22 PROCEDURE — 93306 TTE W/DOPPLER COMPLETE: CPT

## 2024-12-22 PROCEDURE — 87899 AGENT NOS ASSAY W/OPTIC: CPT | Performed by: INTERNAL MEDICINE

## 2024-12-22 PROCEDURE — 71045 X-RAY EXAM CHEST 1 VIEW: CPT

## 2024-12-22 PROCEDURE — 97165 OT EVAL LOW COMPLEX 30 MIN: CPT

## 2024-12-22 PROCEDURE — 85730 THROMBOPLASTIN TIME PARTIAL: CPT | Performed by: INTERNAL MEDICINE

## 2024-12-22 PROCEDURE — 25010000002 HEPARIN (PORCINE) 25000-0.45 UT/250ML-% SOLUTION: Performed by: INTERNAL MEDICINE

## 2024-12-22 PROCEDURE — 25010000002 HEPARIN (PORCINE) PER 1000 UNITS: Performed by: INTERNAL MEDICINE

## 2024-12-22 PROCEDURE — 25510000001 IOPAMIDOL 61 % SOLUTION: Performed by: INTERNAL MEDICINE

## 2024-12-22 PROCEDURE — 99232 SBSQ HOSP IP/OBS MODERATE 35: CPT | Performed by: INTERNAL MEDICINE

## 2024-12-22 RX ORDER — HEPARIN SODIUM 1000 [USP'U]/ML
2000 INJECTION, SOLUTION INTRAVENOUS; SUBCUTANEOUS AS NEEDED
Status: DISCONTINUED | OUTPATIENT
Start: 2024-12-22 | End: 2024-12-22

## 2024-12-22 RX ORDER — IOPAMIDOL 612 MG/ML
100 INJECTION, SOLUTION INTRAVASCULAR
Status: COMPLETED | OUTPATIENT
Start: 2024-12-22 | End: 2024-12-22

## 2024-12-22 RX ORDER — GABAPENTIN 300 MG/1
600 CAPSULE ORAL NIGHTLY
Status: DISCONTINUED | OUTPATIENT
Start: 2024-12-22 | End: 2024-12-22

## 2024-12-22 RX ORDER — GABAPENTIN 400 MG/1
1200 CAPSULE ORAL NIGHTLY
Status: DISCONTINUED | OUTPATIENT
Start: 2024-12-22 | End: 2024-12-23 | Stop reason: HOSPADM

## 2024-12-22 RX ORDER — HEPARIN SODIUM 1000 [USP'U]/ML
4000 INJECTION, SOLUTION INTRAVENOUS; SUBCUTANEOUS AS NEEDED
Status: DISCONTINUED | OUTPATIENT
Start: 2024-12-22 | End: 2024-12-22

## 2024-12-22 RX ORDER — HEPARIN SODIUM 10000 [USP'U]/100ML
12 INJECTION, SOLUTION INTRAVENOUS
Status: DISCONTINUED | OUTPATIENT
Start: 2024-12-22 | End: 2024-12-23

## 2024-12-22 RX ORDER — FUROSEMIDE 10 MG/ML
20 INJECTION INTRAMUSCULAR; INTRAVENOUS DAILY
Status: DISCONTINUED | OUTPATIENT
Start: 2024-12-22 | End: 2024-12-23

## 2024-12-22 RX ORDER — HEPARIN SODIUM 5000 [USP'U]/ML
5000 INJECTION, SOLUTION INTRAVENOUS; SUBCUTANEOUS EVERY 12 HOURS SCHEDULED
Status: DISCONTINUED | OUTPATIENT
Start: 2024-12-22 | End: 2024-12-22

## 2024-12-22 RX ORDER — HEPARIN SODIUM 1000 [USP'U]/ML
4000 INJECTION, SOLUTION INTRAVENOUS; SUBCUTANEOUS ONCE
Status: COMPLETED | OUTPATIENT
Start: 2024-12-22 | End: 2024-12-22

## 2024-12-22 RX ORDER — AZITHROMYCIN 250 MG/1
250 TABLET, FILM COATED ORAL
Status: DISCONTINUED | OUTPATIENT
Start: 2024-12-22 | End: 2024-12-23 | Stop reason: HOSPADM

## 2024-12-22 RX ADMIN — SODIUM CHLORIDE 1000 MG: 900 INJECTION INTRAVENOUS at 20:48

## 2024-12-22 RX ADMIN — PANTOPRAZOLE SODIUM 40 MG: 40 TABLET, DELAYED RELEASE ORAL at 10:20

## 2024-12-22 RX ADMIN — Medication 1000 UNITS: at 09:56

## 2024-12-22 RX ADMIN — AZITHROMYCIN 250 MG: 250 TABLET, FILM COATED ORAL at 14:25

## 2024-12-22 RX ADMIN — IPRATROPIUM BROMIDE AND ALBUTEROL SULFATE 3 ML: 2.5; .5 SOLUTION RESPIRATORY (INHALATION) at 07:54

## 2024-12-22 RX ADMIN — LISINOPRIL 2.5 MG: 2.5 TABLET ORAL at 09:56

## 2024-12-22 RX ADMIN — ASPIRIN 81 MG: 81 TABLET, COATED ORAL at 09:58

## 2024-12-22 RX ADMIN — FUROSEMIDE 20 MG: 10 INJECTION, SOLUTION INTRAMUSCULAR; INTRAVENOUS at 15:49

## 2024-12-22 RX ADMIN — GABAPENTIN 400 MG: 400 CAPSULE ORAL at 05:10

## 2024-12-22 RX ADMIN — PYRIDOXINE HCL TAB 50 MG 100 MG: 50 TAB at 09:56

## 2024-12-22 RX ADMIN — ATORVASTATIN CALCIUM 20 MG: 20 TABLET, FILM COATED ORAL at 21:04

## 2024-12-22 RX ADMIN — GUAIFENESIN 200 MG: 200 SOLUTION ORAL at 00:11

## 2024-12-22 RX ADMIN — METOPROLOL SUCCINATE 25 MG: 25 TABLET, EXTENDED RELEASE ORAL at 09:56

## 2024-12-22 RX ADMIN — LUBIPROSTONE 24 MCG: 24 CAPSULE, GELATIN COATED ORAL at 20:48

## 2024-12-22 RX ADMIN — FOLIC ACID TAB 400 MCG 400 MCG: 400 TAB at 09:56

## 2024-12-22 RX ADMIN — HEPARIN SODIUM 12 UNITS/KG/HR: 10000 INJECTION, SOLUTION INTRAVENOUS at 05:30

## 2024-12-22 RX ADMIN — LUBIPROSTONE 24 MCG: 24 CAPSULE, GELATIN COATED ORAL at 09:56

## 2024-12-22 RX ADMIN — AZITHROMYCIN DIHYDRATE 500 MG: 500 INJECTION, POWDER, LYOPHILIZED, FOR SOLUTION INTRAVENOUS at 00:18

## 2024-12-22 RX ADMIN — IPRATROPIUM BROMIDE AND ALBUTEROL SULFATE 3 ML: 2.5; .5 SOLUTION RESPIRATORY (INHALATION) at 12:12

## 2024-12-22 RX ADMIN — IPRATROPIUM BROMIDE AND ALBUTEROL SULFATE 3 ML: 2.5; .5 SOLUTION RESPIRATORY (INHALATION) at 16:08

## 2024-12-22 RX ADMIN — IOPAMIDOL 100 ML: 612 INJECTION, SOLUTION INTRAVENOUS at 17:05

## 2024-12-22 RX ADMIN — ACETAMINOPHEN 650 MG: 325 TABLET ORAL at 00:11

## 2024-12-22 RX ADMIN — METHYLPREDNISOLONE SODIUM SUCCINATE 40 MG: 40 INJECTION, POWDER, FOR SOLUTION INTRAMUSCULAR; INTRAVENOUS at 00:11

## 2024-12-22 RX ADMIN — ASCORBIC ACID, THIAMINE, RIBOFLAVIN, NIACINAMIDE, PYRIDOXINE, FOLIC ACID, COBALAMIN, BIOTIN, PANTOTHENIC ACID 15 ML: 100; 1.5; 1.7; 20; 10; 1; 6; 300; 1 TABLET, COATED ORAL at 09:56

## 2024-12-22 RX ADMIN — PANTOPRAZOLE SODIUM 40 MG: 40 TABLET, DELAYED RELEASE ORAL at 20:48

## 2024-12-22 RX ADMIN — OXYCODONE HYDROCHLORIDE AND ACETAMINOPHEN 500 MG: 500 TABLET ORAL at 09:56

## 2024-12-22 RX ADMIN — CYANOCOBALAMIN TAB 1000 MCG 500 MCG: 1000 TAB at 09:57

## 2024-12-22 RX ADMIN — ACETAMINOPHEN 650 MG: 325 TABLET ORAL at 20:47

## 2024-12-22 RX ADMIN — IPRATROPIUM BROMIDE AND ALBUTEROL SULFATE 3 ML: 2.5; .5 SOLUTION RESPIRATORY (INHALATION) at 19:22

## 2024-12-22 RX ADMIN — NITROGLYCERIN 0.4 MG: 0.4 TABLET SUBLINGUAL at 05:17

## 2024-12-22 RX ADMIN — HEPARIN SODIUM 4000 UNITS: 1000 INJECTION INTRAVENOUS; SUBCUTANEOUS at 05:29

## 2024-12-22 RX ADMIN — GABAPENTIN 1200 MG: 400 CAPSULE ORAL at 20:47

## 2024-12-22 NOTE — PLAN OF CARE
Problem: Adult Inpatient Plan of Care  Goal: Plan of Care Review  Outcome: Progressing  Flowsheets (Taken 12/22/2024 1829)  Progress: improving  Outcome Evaluation: Pt AO x4/VSS/ 95% on RA during the day, drops to 86% when asleep, 2L NC applied. Pt had ECHO and CT with contrast today per orders. Lasix given as ordered. Pt denies pain/n/v/soa. Continue monitoring.  Plan of Care Reviewed With:   patient   family  Goal: Patient-Specific Goal (Individualized)  Outcome: Progressing  Goal: Absence of Hospital-Acquired Illness or Injury  Outcome: Progressing  Intervention: Identify and Manage Fall Risk  Description: Perform standard risk assessment on admission using a validated tool or comprehensive approach appropriate to the patient; reassess fall risk frequently, with change in status or transfer to another level of care.  Communicate risk to interprofessional healthcare team; ensure fall risk visible cue.  Determine need for increased observation, equipment and environmental modification, as well as use of supportive, nonskid footwear.  Adjust safety measures to individual needs and identified risk factors.  Reinforce the importance of active participation with fall risk prevention, safety, and physical activity with the patient and family.  Perform regular intentional rounding to assess need for position change, pain assessment and personal needs, including assistance with toileting.  Recent Flowsheet Documentation  Taken 12/22/2024 1821 by Suzy Mendoza, RN  Safety Promotion/Fall Prevention:   activity supervised   assistive device/personal items within reach   clutter free environment maintained   fall prevention program maintained   nonskid shoes/slippers when out of bed   room organization consistent   safety round/check completed   toileting scheduled  Taken 12/22/2024 1600 by Suzy Mendoza, RN  Safety Promotion/Fall Prevention:   activity supervised   assistive device/personal items within reach   clutter  free environment maintained   fall prevention program maintained   nonskid shoes/slippers when out of bed   room organization consistent   safety round/check completed   toileting scheduled  Taken 12/22/2024 1425 by Suzy Mendoza RN  Safety Promotion/Fall Prevention:   activity supervised   assistive device/personal items within reach   clutter free environment maintained   fall prevention program maintained   nonskid shoes/slippers when out of bed   room organization consistent   safety round/check completed   toileting scheduled  Taken 12/22/2024 1220 by Suzy Mendoza RN  Safety Promotion/Fall Prevention:   activity supervised   assistive device/personal items within reach   clutter free environment maintained   fall prevention program maintained   nonskid shoes/slippers when out of bed   room organization consistent   safety round/check completed   toileting scheduled  Taken 12/22/2024 1000 by Suzy Mendoza RN  Safety Promotion/Fall Prevention:   activity supervised   assistive device/personal items within reach   clutter free environment maintained   fall prevention program maintained   nonskid shoes/slippers when out of bed   room organization consistent   safety round/check completed   toileting scheduled  Taken 12/22/2024 0800 by Suzy Mendoza RN  Safety Promotion/Fall Prevention:   activity supervised   assistive device/personal items within reach   clutter free environment maintained   fall prevention program maintained   nonskid shoes/slippers when out of bed   room organization consistent   safety round/check completed   toileting scheduled  Intervention: Prevent Skin Injury  Description: Perform a screening for skin injury risk, such as pressure or moisture-associated skin damage on admission and at regular intervals throughout hospital stay.  Keep all areas of skin (especially folds) clean and dry.  Maintain adequate skin hydration.  Relieve and redistribute pressure and protect bony prominences  and skin at risk for injury; implement measures based on patient-specific risk factors.  Match turning and repositioning schedule to clinical condition.  Encourage weight shift frequently; assist with reposition if unable to complete independently.  Float heels off bed; avoid pressure on the Achilles tendon.  Keep skin free from extended contact with medical devices.  Optimize nutrition and hydration.  Encourage functional activity and mobility, as early as tolerated.  Use aids (e.g., slide boards, mechanical lift) during transfer.  Recent Flowsheet Documentation  Taken 12/22/2024 1821 by Suzy Mendoza RN  Body Position: position changed independently  Skin Protection:   incontinence pads utilized   silicone foam dressing in place  Taken 12/22/2024 1600 by Suzy Mendoza RN  Body Position: position changed independently  Skin Protection:   incontinence pads utilized   silicone foam dressing in place  Taken 12/22/2024 1425 by Suzy Mendoza RN  Body Position: position changed independently  Skin Protection:   incontinence pads utilized   silicone foam dressing in place  Taken 12/22/2024 1220 by Suzy Mendoza RN  Body Position: position changed independently  Skin Protection:   incontinence pads utilized   silicone foam dressing in place  Taken 12/22/2024 1000 by Suzy Mendoza RN  Body Position: position changed independently  Skin Protection:   incontinence pads utilized   silicone foam dressing in place  Taken 12/22/2024 0800 by Suzy Mendoza RN  Body Position: position changed independently  Skin Protection:   incontinence pads utilized   silicone foam dressing in place  Intervention: Prevent and Manage VTE (Venous Thromboembolism) Risk  Description: Assess for VTE (venous thromboembolism) risk.  Promote early mobilization; encourage both active and passive leg exercises, if unable to ambulate.  Initiate and maintain compression or other therapy, as indicated, based on identified risk in accordance with  organizational protocol and provider order.  Recognize the patient's individual risk for bleeding before initiating pharmacologic thromboprophylaxis.  Recent Flowsheet Documentation  Taken 12/22/2024 1000 by Suzy Mendoza RN  VTE Prevention/Management: (see MAR)   bilateral   compression stockings off   other (see comments)  Intervention: Prevent Infection  Description: Maintain skin and mucous membrane integrity; promote hand, oral and pulmonary hygiene.  Optimize fluid balance, nutrition, sleep and glycemic control to maximize infection resistance.  Identify potential sources of infection early to prevent or mitigate progression of infection (e.g., wound, lines, devices).  Evaluate ongoing need for invasive devices; remove promptly when no longer indicated.  Review vaccination status.  Recent Flowsheet Documentation  Taken 12/22/2024 1821 by Suzy Mendoza RN  Infection Prevention:   environmental surveillance performed   rest/sleep promoted   single patient room provided  Taken 12/22/2024 1600 by Suzy Mendoza RN  Infection Prevention:   environmental surveillance performed   rest/sleep promoted   single patient room provided  Taken 12/22/2024 1425 by Suzy Mendoza RN  Infection Prevention:   environmental surveillance performed   rest/sleep promoted   single patient room provided  Taken 12/22/2024 1220 by Suzy Mendoza RN  Infection Prevention:   environmental surveillance performed   rest/sleep promoted   single patient room provided  Taken 12/22/2024 1000 by Suzy Mendoza RN  Infection Prevention:   environmental surveillance performed   rest/sleep promoted   single patient room provided  Taken 12/22/2024 0800 by Suzy Mendoza RN  Infection Prevention:   environmental surveillance performed   rest/sleep promoted   single patient room provided  Goal: Optimal Comfort and Wellbeing  Outcome: Progressing  Intervention: Monitor Pain and Promote Comfort  Description: Assess pain level, treatment  efficacy and patient response at regular intervals using a consistent pain scale.  Consider the presence and impact of preexisting chronic pain.  Encourage patient and caregiver involvement in pain assessment, interventions and safety measures.  Promote activity; balance with sleep and rest to enhance healing.  Recent Flowsheet Documentation  Taken 12/22/2024 1821 by Suzy Mendoza RN  Pain Management Interventions:   care clustered   pillow support provided  Taken 12/22/2024 1600 by uSzy Mendoza RN  Pain Management Interventions:   care clustered   pillow support provided  Taken 12/22/2024 1425 by Suzy Mendoza RN  Pain Management Interventions:   care clustered   pillow support provided  Taken 12/22/2024 1220 by Suzy Mendoza RN  Pain Management Interventions:   care clustered   pillow support provided  Taken 12/22/2024 1000 by Suzy Mendoza RN  Pain Management Interventions:   care clustered   pillow support provided  Taken 12/22/2024 0800 by Suzy Mendoza RN  Pain Management Interventions:   care clustered   pillow support provided  Intervention: Provide Person-Centered Care  Description: Use a family-focused approach to care; encourage support system presence and participation.  Develop trust and rapport by proactively providing information, encouraging questions, addressing concerns and offering reassurance.  Acknowledge emotional response to hospitalization.  Recognize and utilize personal coping strategies and strengths; develop goals via shared decision-making.  Honor spiritual and cultural preferences.  Recent Flowsheet Documentation  Taken 12/22/2024 1821 by Suzy Mendoza RN  Trust Relationship/Rapport:   care explained   choices provided   emotional support provided   empathic listening provided   questions answered   questions encouraged   reassurance provided   thoughts/feelings acknowledged  Taken 12/22/2024 1600 by Suzy Mendoza RN  Trust Relationship/Rapport:   care explained    choices provided   emotional support provided   empathic listening provided   questions answered   questions encouraged   reassurance provided   thoughts/feelings acknowledged  Taken 12/22/2024 1425 by Suzy Mendoza RN  Trust Relationship/Rapport:   care explained   choices provided   emotional support provided   empathic listening provided   questions answered   questions encouraged   reassurance provided   thoughts/feelings acknowledged  Taken 12/22/2024 1220 by Suzy Mendoza RN  Trust Relationship/Rapport:   care explained   choices provided   emotional support provided   empathic listening provided   questions answered   questions encouraged   reassurance provided   thoughts/feelings acknowledged  Taken 12/22/2024 1000 by Suzy Mendoza RN  Trust Relationship/Rapport:   care explained   choices provided   emotional support provided   empathic listening provided   questions answered   questions encouraged   reassurance provided   thoughts/feelings acknowledged  Taken 12/22/2024 0800 by Suzy Mendoza RN  Trust Relationship/Rapport:   care explained   choices provided   emotional support provided   empathic listening provided   questions answered   questions encouraged   reassurance provided   thoughts/feelings acknowledged  Goal: Readiness for Transition of Care  Outcome: Progressing   Goal Outcome Evaluation:  Plan of Care Reviewed With: patient, family        Progress: improving  Outcome Evaluation: Pt AO x4/VSS/ 95% on RA during the day, drops to 86% when asleep, 2L NC applied. Pt had ECHO and CT with contrast today per orders. Lasix given as ordered. Pt denies pain/n/v/soa. Continue monitoring.

## 2024-12-22 NOTE — THERAPY EVALUATION
Patient Name: Albin Andino  : 1940    MRN: [id number]                              Today's Date: 2024       Admit Date: 2024    Visit Dx: No diagnosis found.  Patient Active Problem List   Diagnosis    Osteoarthritis    Enlarged prostate    Essential hypertension    Hyperlipidemia LDL goal <70    Low back pain    Restless legs syndrome    Thrombocytopenia    Low vitamin D level    Coronary artery disease involving native coronary artery of native heart with angina pectoris    Carotid stenosis, symptomatic w/o infarct, bilateral    Pneumonia    Esophageal stricture    Troponin level elevated    Chronic HFrEF     Past Medical History:   Diagnosis Date    Arthritis     Atrial fibrillation 2016    Dr. Mullins      Chronic HFrEF (heart failure with reduced ejection fraction)     Coronary artery disease     Elevated LFTs, with hyperbilirubinemia, due to above  2016    Enlarged prostate     Heart disease     History of transfusion     Hyperlipidemia     Hypertension     Normocytic anemia 2016     Past Surgical History:   Procedure Laterality Date    BACK SURGERY      CARDIAC CATHETERIZATION Right 2024    Procedure: Stent YAMILETH bypass graft;  Surgeon: Niels Cruz IV, MD;  Location:  [facility];  Service: Cardiovascular;  Laterality: Right;    CARDIAC SURGERY      CHOLECYSTECTOMY N/A 2016    Procedure: CHOLECYSTECTOMY LAPAROSCOPIC WITH INTRA-OPERATIVE CHOLANGIOGRAM;  Surgeon: Rogelio Sanchez MD;  Location: [facility] OR;  Service:     CORONARY STENT PLACEMENT      HERNIA REPAIR      KNEE SURGERY      PROSTATE SURGERY      THROAT SURGERY        General Information       Row Name 24 1109          OT Time and Intention    Document Type evaluation  -KL     Mode of Treatment occupational therapy  -KL     Patient Effort good  -KL     Symptoms Noted During/After Treatment fatigue  -KL       Row Name 24 1106          General Information     Patient Profile Reviewed yes  -     Prior Level of Function independent:;all household mobility;community mobility;gait;transfer;bed mobility;ADL's  FWW; 1 recent fall  -     Existing Precautions/Restrictions fall  [place]; flu +  -     Barriers to Rehab none identified  -       Row Name 12/22/24 1103          Living Environment    People in Home spouse  -       Row Name 12/22/24 1103          Home Main Entrance    Number of Stairs, Main Entrance one  -       Row Name 12/22/24 1103          Stairs Within Home, Primary    Stairs, Within Home, Primary Can reside on the main level  -       Row Name 12/22/24 1103          Cognition    Orientation Status (Cognition) oriented x 3  -       Row Name 12/22/24 1103          Safety Issues/Impairments Affecting Functional Mobility    Safety Issues Affecting Function (Mobility) insight into deficits/self-awareness;safety precaution awareness;safety precautions follow-through/compliance;awareness of need for assistance  -     Impairments Affecting Function (Mobility) balance;endurance/activity tolerance;shortness of breath;strength  -               User Key  (r) = Recorded By, (t) = Taken By, (c) = Cosigned By      Initials Name Provider Type     Briana Joe OT Occupational Therapist                     Mobility/ADL's       Row Name 12/22/24 1105          Bed Mobility    Bed Mobility supine-sit  -     Supine-Sit [place] (Bed Mobility) supervision  -     Assistive Device (Bed Mobility) head of bed elevated  -       Row Name 12/22/24 1105          Transfers    Transfers sit-stand transfer;bed-chair transfer  -       Row Name 12/22/24 1105          Bed-Chair Transfer    Bed-Chair [place] (Transfers) contact guard;1 person assist;verbal cues  -     Assistive Device (Bed-Chair Transfers) walker, front-wheeled  -       Row Name 12/22/24 1105          Sit-Stand Transfer    Sit-Stand [place] (Transfers) contact guard;1 person assist  -      Assistive Device (Sit-Stand Transfers) walker, front-wheeled  -       Row Name 12/22/24 1105          Functional Mobility    Functional Mobility- Ind. Level contact guard assist;1 person;verbal cues required  -     Functional Mobility- Device walker, front-wheeled  -     Functional Mobility-Distance (Feet) --  < HH distances  -[facility] Name 12/22/24 1105          Activities of Daily Living    BADL Assessment/Intervention lower body dressing  -KL       Row Name 12/22/24 1105          Lower Body Dressing Assessment/Training    [place] Level (Lower Body Dressing) supervision  -     Position (Lower Body Dressing) edge of bed sitting  -     Comment, (Lower Body Dressing) Pt able to demo figure 4 to adjust B footwear  -               User Key  (r) = Recorded By, (t) = Taken By, (c) = Cosigned By      Initials Name Provider Type    Briana Garza OT Occupational Therapist                   Obj/Interventions       Row Name 12/22/24 1109          Sensory Assessment (Somatosensory)    Sensory Assessment (Somatosensory) UE sensation intact  -KL       Row Name 12/22/24 1109          Range of Motion Comprehensive    General Range of Motion bilateral upper extremity ROM WFL  -KL       Row Name 12/22/24 1109          Strength Comprehensive (MMT)    Comment, General Manual Muscle Testing (MMT) Assessment BUE MMT grossly 4/5  -[facility] Name 12/22/24 1109          Balance    Balance Assessment sitting static balance;sitting dynamic balance;sit to stand dynamic balance;standing static balance;standing dynamic balance  -     Static Sitting Balance supervision  -     Dynamic Sitting Balance contact guard  -     Position, Sitting Balance sitting edge of bed  -     Static Standing Balance contact guard  -     Dynamic Standing Balance contact guard  -     Position/Device Used, Standing Balance supported  -     Balance Interventions sitting;standing;sit to stand;supported;static;dynamic;occupation  based/functional task  -               User Key  (r) = Recorded By, (t) = Taken By, (c) = Cosigned By      Initials Name Provider Type    Briana Garza OT Occupational Therapist                   Goals/Plan       Row Name 12/22/24 1112          Transfer Goal 1 (OT)    Activity/Assistive Device (Transfer Goal 1, OT) sit-to-stand/stand-to-sit;toilet  -     [place] Level/Cues Needed (Transfer Goal 1, OT) supervision required  -KL     Time Frame (Transfer Goal 1, OT) short term goal (STG);3 days  -KL     Progress/Outcome (Transfer Goal 1, OT) new goal  -       Row Name 12/22/24 1112          Toileting Goal 1 (OT)    Activity/Device (Toileting Goal 1, OT) adjust/manage clothing;perform perineal hygiene  -KL     [place] Level/Cues Needed (Toileting Goal 1, OT) modified independence  -KL     Time Frame (Toileting Goal 1, OT) long term goal (LTG);5 days  -KL     Progress/Outcome (Toileting Goal 1, OT) new goal  -       Row Name 12/22/24 1112          Grooming Goal 1 (OT)    Activity/Device (Grooming Goal 1, OT) hair care;oral care;wash face, hands  -KL     [place] (Grooming Goal 1, OT) modified independence  -KL     Time Frame (Grooming Goal 1, OT) long term goal (LTG);5 days  -KL     Strategies/Barriers (Grooming Goal 1, OT) sink side  -KL     Progress/Outcome (Grooming Goal 1, OT) new goal  -       Row Name 12/22/24 1112          Therapy Assessment/Plan (OT)    Planned Therapy Interventions (OT) activity tolerance training;functional balance retraining;occupation/activity based interventions;patient/caregiver education/training;ROM/therapeutic exercise;strengthening exercise;transfer/mobility retraining  -               User Key  (r) = Recorded By, (t) = Taken By, (c) = Cosigned By      Initials Name Provider Type    Briana Garza OT Occupational Therapist                   Clinical Impression       Row Name 12/22/24 1110          Pain Assessment    Pretreatment Pain Rating 0/10 - no pain   -     Posttreatment Pain Rating 0/10 - no pain  -       Row Name 12/22/24 1110          Plan of Care Review    Plan of Care Reviewed With patient;daughter  -     Progress no change  -     Outcome Evaluation Pt presents to OT evaluation w/ minor deficits in functional activity tolerance, generalized weakness and self-care. Pt would benefit from IPOT services to address deficits in order to progress towards goals. d/c rec is home w/ assist and HH.  -       Row Name 12/22/24 1110          Therapy Assessment/Plan (OT)    Patient/Family Therapy Goal Statement (OT) Return to PLOF  -     Rehab Potential (OT) good  -     Criteria for Skilled Therapeutic Interventions Met (OT) yes  -     Therapy Frequency (OT) daily  -     Predicted Duration of Therapy Intervention (OT) 5 days  -       Row Name 12/22/24 1110          Therapy Plan Review/Discharge Plan (OT)    Anticipated Discharge Disposition (OT) home with assist;home with home health  -       Row Name 12/22/24 1110          Vital Signs    Pretreatment Heart Rate (beats/min) 66  -KL     Posttreatment Heart Rate (beats/min) 67  -     Pre SpO2 (%) 95  -KL     O2 Delivery Pre Treatment nasal cannula  -     Intra SpO2 (%) 96  -KL     O2 Delivery Intra Treatment room air  -     Post SpO2 (%) 96  -     O2 Delivery Post Treatment room air  -     Pre Patient Position Supine  -     Intra Patient Position Standing  -     Post Patient Position Sitting  -       Row Name 12/22/24 1110          Positioning and Restraints    Pre-Treatment Position in bed  -     Post Treatment Position chair  -     In Chair notified nsg;reclined;sitting;call light within reach;encouraged to call for assist;exit alarm on;with family/caregiver;waffle cushion;on mechanical lift sling  -               User Key  (r) = Recorded By, (t) = Taken By, (c) = Cosigned By      Initials Name Provider Type    Briana Garza, OT Occupational Therapist                   Outcome  Measures       Row Name 12/22/24 1113          How much help from another is currently needed...    Putting on and taking off regular lower body clothing? 3  -KL     Bathing (including washing, rinsing, and drying) 3  -KL     Toileting (which includes using toilet bed pan or urinal) 3  -KL     Putting on and taking off regular upper body clothing 4  -KL     Taking care of personal grooming (such as brushing teeth) 4  -KL     Eating meals 4  -KL     AM-PAC 6 Clicks Score (OT) 21  -KL       Row Name 12/22/24 1113          Functional Assessment    Outcome Measure Options AM-PAC 6 Clicks Daily Activity (OT)  -               User Key  (r) = Recorded By, (t) = Taken By, (c) = Cosigned By      Initials Name Provider Type    Briana Garza OT Occupational Therapist                    Occupational Therapy Education       Title: PT OT SLP Therapies (In Progress)       Topic: Occupational Therapy (In Progress)       Point: ADL training (In Progress)       Description:   Instruct learner(s) on proper safety adaptation and remediation techniques during self care or transfers.   Instruct in proper use of assistive devices.                  Learning Progress Summary            Patient Acceptance, E, NR by RACHAEL at 12/22/2024 1113   Family Acceptance, E, NR by  at 12/22/2024 1113                      Point: Home exercise program (Not Started)       Description:   Instruct learner(s) on appropriate technique for monitoring, assisting and/or progressing therapeutic exercises/activities.                  Learner Progress:  Not documented in this visit.              Point: Precautions (In Progress)       Description:   Instruct learner(s) on prescribed precautions during self-care and functional transfers.                  Learning Progress Summary            Patient Acceptance, E, NR by RACHAEL at 12/22/2024 1113   Family Acceptance, E, NR by  at 12/22/2024 1113                      Point: Body mechanics (In Progress)        Description:   Instruct learner(s) on proper positioning and spine alignment during self-care, functional mobility activities and/or exercises.                  Learning Progress Summary            Patient Acceptance, E, NR by  at 12/22/2024 1113   Family Acceptance, E, NR by  at 12/22/2024 1113                                      User Key       Initials Effective Dates Name Provider Type Discipline     02/05/24 -  Briana Joe OT Occupational Therapist OT                  OT Recommendation and Plan  Planned Therapy Interventions (OT): activity tolerance training, functional balance retraining, occupation/activity based interventions, patient/caregiver education/training, ROM/therapeutic exercise, strengthening exercise, transfer/mobility retraining  Therapy Frequency (OT): daily  Plan of Care Review  Plan of Care Reviewed With: patient, daughter  Progress: no change  Outcome Evaluation: Pt presents to OT evaluation w/ minor deficits in functional activity tolerance, generalized weakness and self-care. Pt would benefit from IPOT services to address deficits in order to progress towards goals. d/c rec is home w/ assist and HH.     Time Calculation:   Evaluation Complexity (OT)  Review Occupational Profile/Medical/Therapy History Complexity: brief/low complexity  Assessment, Occupational Performance/Identification of Deficit Complexity: 1-3 performance deficits  Clinical Decision Making Complexity (OT): problem focused assessment/low complexity  Overall Complexity of Evaluation (OT): low complexity     Time Calculation- OT       Row Name 12/22/24 1113             Time Calculation- OT    OT Start Time 0845  -KL      OT Received On 12/22/24  -      OT Goal Re-Cert Due Date 01/01/25  -         Timed Charges    67086 - OT Therapeutic Activity Minutes 10  -KL         Untimed Charges    OT Eval/Re-eval Minutes 47  -KL         Total Minutes    Timed Charges Total Minutes 10  -KL      Untimed Charges Total Minutes  47  -       Total Minutes 57  -KL                User Key  (r) = Recorded By, (t) = Taken By, (c) = Cosigned By      Initials Name Provider Type    Briana Garza OT Occupational Therapist                  Therapy Charges for Today       Code Description Service Date Service Provider Modifiers Qty    [id number]  OT THERAPEUTIC ACT EA 15 MIN 12/22/2024 Briana Joe OT GO 1    [id number]  OT EVAL LOW COMPLEXITY 4 12/22/2024 Briana Joe OT GO 1                 Briana Joe OT  12/22/2024

## 2024-12-22 NOTE — PROGRESS NOTES
Pharmacy to Dose Heparin Infusion Note    Albin Andino is a 84 y.o. male receiving heparin infusion.     Therapy for (VTE/Cardiac): cardiac  Patient Weight: 81.5 kg  Initial Bolus (Y/N): Yes  Any Bolus (Y/N): Yes     Signs or Symptoms of Bleeding: none noted    Cardiac or Other (Not VTE)   Initial Bolus: 60 units/kg (Max 4,000 units)  Initial rate: 12 units/kg/hr (Max 1,000 units/hr)   Anti-Xa Bolus   Dose Infusion Hold   Time Infusion Rate Change (units/kg/hr) Repeat  Anti-Xa    < 0.11 50 units/kg  (4000 units Max) None Increase by  3 units/kg/hr 6 hours   0.11- 0.19 25 units/kg  (2000 units Max) None Increase by  2 units/kg/hr 6 hours   0.2 - 0.29 0 None Increase by  1 units/kg/hr 6 hours   0.3 - 0.5 0 None No Change 6 hours (after 2 consecutive levels in range check qAM)   0.51 - 0.6 0 None Decrease by  1 units/kg/hr 6 hours   0.61 - 0.8 0 30 minutes Decrease by  2 units/kg/hr 6 hours   0.81 - 1 0 60 minutes Decrease by  3 units/kg/hr 6 hours   >1 0 Hold  After Anti-Xa less than 0.5 decrease previous rate by  4 units/kg/hr  Every 2 hours until Anti-Xa  less than 0.5 then when infusion restarts in 6 hours       Results from last 7 days   Lab Units 12/22/24  0531 12/21/24  2329   INR  1.09  --    HEMOGLOBIN g/dL  --  13.9   HEMATOCRIT %  --  41.3   PLATELETS 10*3/mm3  --  98*       Date   Time   Anti-Xa Current Rate (units/kg/hr) Bolus   (units) Rate Change   (units/kg/hr) New Rate (units/kg/hr) Repeat  Anti-Xa Comments /  Pump Check    12/22 0530 0.1 new 4000 12 12 1200 D/w RN, on heparin briefly at [facility], has been off since he arrived here last night.     12/22 1139 0.33 12 -- -- 12 1800 D/w SHANNAN Hernandez, Calvin  12/22/2024  12:21 EST

## 2024-12-22 NOTE — PLAN OF CARE
Called and spoke with pt, updated that Dr Willis Riojas sent refill request for zoloft  Goal Outcome Evaluation:  Plan of Care Reviewed With: patient, daughter        Progress: no change  Outcome Evaluation: Pt presents to OT evaluation w/ minor deficits in functional activity tolerance, generalized weakness and self-care. Pt would benefit from IPOT services to address deficits in order to progress towards goals. d/c rec is home w/ assist and HH.    Anticipated Discharge Disposition (OT): home with assist, home with home health

## 2024-12-22 NOTE — H&P
[facility] Medicine Services  HISTORY AND PHYSICAL    Patient Name: Albin Andino  : 1940  MRN: [id number]  Primary Care Physician: Ariela Brannon DO    Subjective   Subjective     Chief Complaint:weakness    HPI:  Albin Andino is a 84 y.o. male who  has a past medical history of Arthritis, Atrial fibrillation (2016), Coronary artery disease with HO , Enlarged prostate, History of transfusion, Hyperlipidemia, Hypertension, and Normocytic anemia (2016). who presented to [facility] with 3 days of weakness, a few falls, cough, low grade fever. He was found to have pneumonia with a considerable bump in his troponin and  transferred to [facility] for cardiology evaluation.  Patient denies current chest pain and hasn't had any for days however upon presentation to the OSH his BP was less than 80 and he responded to IVFs. He is now requiring 2 LPM of oxygen. HE reports some increasing difficulty with food getting stuck and he has to force it up- even cornbread in milk.    Otherwise complete ROS is negative except as mentioned in the HPI.    Personal History     PMH: He  has a past medical history of Arthritis, Atrial fibrillation (2016), Chronic HFrEF (heart failure with reduced ejection fraction), Coronary artery disease, Elevated LFTs, with hyperbilirubinemia, due to above  (2016), Enlarged prostate, Heart disease, History of transfusion, Hyperlipidemia, Hypertension, and Normocytic anemia (2016).   PSxH: He  has a past surgical history that includes Cardiac surgery; Back surgery; Hernia repair; Knee surgery; Prostate surgery; Throat surgery; Cholecystectomy (N/A, 2016); Coronary stent placement; and Cardiac catheterization (Right, 2024).         FH: His family history includes Diabetes in his father; Heart attack in his father; Hypertension in his mother; Stroke in his mother.   SH: He  reports that he quit smoking about  "40 years ago. His smoking use included cigarettes. He started smoking about 67 years ago. He has a 27 pack-year smoking history. He has been exposed to tobacco smoke. He has never used smokeless tobacco. He reports current alcohol use of about 1.0 standard drink of alcohol per week. He reports that he does not use drugs.     Medications:  ICaps Areds 2, Vitamin C, aspirin, atorvastatin, benazepril, cholecalciferol, clopidogrel, cyanocobalamin, folic acid, gabapentin, linaclotide, metoprolol succinate XL, nitroglycerin, pantoprazole, and vitamin B-6    Allergies   Allergen Reactions    Hydrocodone Confusion     Pt reports that it \"makes him go crazy\"    Hydrocodone-Acetaminophen Confusion and Other (See Comments)    Morphine And Codeine Other (See Comments)       Objective   Objective     Vital Signs:   Temp:  [99 °F (37.2 °C)] 99 °F (37.2 °C)  Heart Rate:  [81] 81  Resp:  [20] 20  BP: (159)/(97) 159/97  Flow (L/min) (Oxygen Therapy):  [2] 2    Constitutional: Awake, alert, interactive and pleasant, warm to touch  Eyes: clear sclerae, no conjunctival injection  HENT: NCAT, mucous membranes dry  Neck: no masses or lymphadenopathy, trachea midline  Respiratory: coarse breath sounds bilaterally, respirations unlabored  Cardiovascular: RRR, diastolic murmur appreciated, palpable peripheral pulses, no edema  Abdomen:  soft, no HSM or masses palpable, not tender or distended  Musculoskeletal: No peripheral edema, clubbing or cyanosis  Neurologic: Oriented x 3,                       Strength symmetric in all extremities                     Cranial Nerves grossly intact, speech clear  Skin: No rashes or jaundice  Psychiatric: Appropriate mood, insight      Result Review:  I have personally reviewed the results from the time of this admission   to 12/22/2024 03:35 EST and agree with these findings:  [x]  Laboratory  [x]  Microbiology  []  Radiology  [x]  EKG/Telemetry   [x]  Cardiology/Vascular   []  Pathology  []  Old " records  []  Other:  Most notable findings include:   CXR: with LLL infiltrate   [facility] 9/12/24     Successful high risk PCI of degenerated sequential SVG to RPDA/RPL--proximal and distal segments--using large-caliber drug-eluting stents.  Residual stenosis of vein graft supplying RPL not treated    Successful high risk PCI of degenerated SVG to OM with large caliber YAMILETH  Left Main   Ost LM to Prox LAD lesion is 100% stenosed. YU flow is 0.      Left Circumflex   Ost Cx lesion is 100% stenosed. YU flow is 0.      Right Coronary Artery   Prox RCA to Dist RCA lesion is 100% stenosed. YU flow is 0.      Sequential Graft To RPDA, 2nd RPL   Origin to Prox Graft lesion before RPDA is 95% stenosed. Culprit lesion. YU flow is 3. The lesion is type C. The lesion is severely calcified.   Dist Graft lesion before RPDA is 95% stenosed. Culprit lesion. YU flow is 3. The lesion is type C. The lesion is severely calcified.   Prox Graft to Mid Graft lesion between RPDA and 2nd RPL is 90% stenosed.      Graft To 1st Mrg   Origin to Prox Graft lesion is 95% stenosed. YU flow is 3. The lesion is type C. The lesion is severely calcified.   Previously placed Prox Graft to Mid Graft stent (unknown type) is widely patent.      LIMA Graft To Mid LAD        LAB RESULTS:      Lab 12/21/24 2329   WBC 3.99   HEMOGLOBIN 13.9   HEMATOCRIT 41.3   PLATELETS 98*   NEUTROS ABS 2.86   IMMATURE GRANS (ABS) 0.01   LYMPHS ABS 0.57*   MONOS ABS 0.54   EOS ABS 0.01   MCV 87.9   PROCALCITONIN 0.09   LACTATE 1.5   HEPARIN ANTI-XA 0.10*         Lab 12/21/24 2329   SODIUM 140   POTASSIUM 4.4   CHLORIDE 101   CO2 28.0   ANION GAP 11.0   BUN 12   CREATININE 0.74*   EGFR 88.2   GLUCOSE 100*   CALCIUM 8.9         Lab 12/21/24 2329   TOTAL PROTEIN 6.6   ALBUMIN 4.1   GLOBULIN 2.5   ALT (SGPT) 21   AST (SGOT) 42*   BILIRUBIN 1.0   ALK PHOS 96         Lab 12/21/24 2329   HSTROP T 293*                 Brief Urine Lab Results       None          No  "results found for: \"COVID19\"    XR Outside Films    Result Date: 12/21/2024  This procedure was auto-finalized with no dictation required.   Echo 9/6/24    1. Left ventricle: The cavity size is mildly dilated. Wall      thickness is increased. Systolic function is mildly reduced. The      estimated ejection fraction is 40-45%. Moderate diffuse      hypokinesis. Doppler parameters are consistent with abnormal      left ventricular relaxation (grade 1 diastolic dysfunction).   2. Left atrium: The atrium is mildly dilated.   3. Atrial septum: No defect or patent foramen ovale is identified.   4. Mitral valve: There is moderate to severe regurgitation.   5. Aortic valve: Thickening, consistent with sclerosis. The      findings are consistent with mild to moderate stenosis. There is      mild to moderate regurgitation.   6. Tricuspid valve: There is mild-moderate regurgitation.       The patient has started, but not completed, their COVID-19 vaccination series.    Assessment & Plan   Assessment / Plan       Pneumonia    Esophageal stricture    Troponin level elevated    Essential hypertension    Hyperlipidemia LDL goal <70    Restless legs syndrome    Coronary artery disease involving native coronary artery of native heart with angina pectoris    Chronic HFrEF      Assessment & Plan:  Mr Andino is an 85 yo with PMH of HTN, HLP, RLS, esophageal dilation, complicated CAD and recent stent by Dr Cruz who [facility]  who weakness and found to have pneumonia and quite elevated troponin    Pneumonia possible aspiration  Acute hypoxic respiratory failure  -- cont rocephin and zithromax  -- oxygen  - one dose of steroids  -- duonebs  --check urine antigens  -increase pulmonary toilet  -ask GI to see for possible esophageal dilation (plavix on hold)    CAD with recent stents 9/12/24  Chronic HFrEF  Mitral regurgitation/Aortic Stenosis  -cont aspirin, hold plavix -- for ?EGD?-- restart asap  --hold aspirin as " no angina and no ischemia on EKG    HLP  -cont statin    VTE Prophylaxis:HEP SQ    CODE STATUS:  Code Status (Patient has no pulse and is not breathing): CPR (Attempt to Resuscitate)  Medical Interventions (Patient has pulse or is breathing): Full Support    Expected Discharge  Expected Discharge Date: 12/24/2024; Expected Discharge Time:     Electronically signed by Julianne Acevedo MD 12/22/24 03:35 EST

## 2024-12-22 NOTE — THERAPY EVALUATION
Patient Name: Albin Andino  : 1940    MRN: [id number]                              Today's Date: 2024       Admit Date: 2024    Visit Dx: No diagnosis found.  Patient Active Problem List   Diagnosis    Osteoarthritis    Enlarged prostate    Essential hypertension    Hyperlipidemia LDL goal <70    Low back pain    Restless legs syndrome    Thrombocytopenia    Low vitamin D level    Coronary artery disease involving native coronary artery of native heart with angina pectoris    Carotid stenosis, symptomatic w/o infarct, bilateral    Pneumonia    Esophageal stricture    Troponin level elevated    Chronic HFrEF     Past Medical History:   Diagnosis Date    Arthritis     Atrial fibrillation 2016    Dr. Mullins      Chronic HFrEF (heart failure with reduced ejection fraction)     Coronary artery disease     Elevated LFTs, with hyperbilirubinemia, due to above  2016    Enlarged prostate     Heart disease     History of transfusion     Hyperlipidemia     Hypertension     Normocytic anemia 2016     Past Surgical History:   Procedure Laterality Date    BACK SURGERY      CARDIAC CATHETERIZATION Right 2024    Procedure: Stent YAMILETH bypass graft;  Surgeon: Niels Cruz IV, MD;  Location:  [facility];  Service: Cardiovascular;  Laterality: Right;    CARDIAC SURGERY      CHOLECYSTECTOMY N/A 2016    Procedure: CHOLECYSTECTOMY LAPAROSCOPIC WITH INTRA-OPERATIVE CHOLANGIOGRAM;  Surgeon: Rogelio Sanchez MD;  Location: [facility] OR;  Service:     CORONARY STENT PLACEMENT      HERNIA REPAIR      KNEE SURGERY      PROSTATE SURGERY      THROAT SURGERY        General Information       Row Name 24 1057          Physical Therapy Time and Intention    Document Type evaluation  -DM     Mode of Treatment physical therapy  -DM       Row Name 24 105          General Information    Patient Profile Reviewed yes  -DM     Prior Level of Function  independent:;all household mobility;community mobility;gait;transfer;bed mobility;ADL's  indep gt w/ FWW;1 Recent fall;uses recumb bike daily  -DM     Existing Precautions/Restrictions fall;other (see comments)  influenza (droplet), NSTEMI; PNA;AF;elev trop.;Recent YAMILETH stent 9-12-24;MR/Ao.sten.; ? Covid r/o'd; recent fall;OA  -DM     Barriers to Rehab medically complex;visual deficit;hearing deficit  Wears reading glasses;[place] (needs h.aid loraine.recharge)  -DM       Row Name 12/22/24 1059          Living Environment    People in Home spouse  dtr & Grandson avail PRN  -DM       Row Name 12/22/24 1059          Home Main Entrance    Number of Stairs, Main Entrance one  -DM       Row Name 12/22/24 1059          Stairs Within Home, Primary    Stairs, Within Home, Primary 2-st;can use gr fl B/B  -DM     Number of Stairs, Within Home, Primary other (see comments)  doesn't need to access  -DM       Row Name 12/22/24 1059          Cognition    Orientation Status (Cognition) oriented to;time;disoriented to;place;verbal cues/prompts needed for orientation  stated he was in a hosp but not recalled [facility];said Dec. 23, then 22  -DM       Row Name 12/22/24 1059          Safety Issues/Impairments Affecting Functional Mobility    Safety Issues Affecting Function (Mobility) awareness of need for assistance;insight into deficits/self-awareness;safety precautions follow-through/compliance;sequencing abilities  -DM     Impairments Affecting Function (Mobility) balance;endurance/activity tolerance;postural/trunk control;shortness of breath;strength  -DM               User Key  (r) = Recorded By, (t) = Taken By, (c) = Cosigned By      Initials Name Provider Type    DM Dinora Wilkerson, PT Physical Therapist                   Mobility       Row Name 12/22/24 1059          Bed Mobility    Comment, (Bed Mobility) UIC per OT (had amb short dist. w/AD),then nsg gave gabapentin & onset drowsiness,which has resolved;Cardiol. in to assess & said  cardiac echo pending;Mask issued d/t droplet prec.;instructed in PLB; lab in for blood draw; dtr left & pt's spouse arrived (coughing & stating she felt bad;issued mask to her);pt stating he needs h.aid loraine recharged when dtr ret.(unable to hear PT unless cues repeated loudly mult x's);did LAQ, marches,rocking h.to toe;noted ramu. (per dtr,d/t beta blocker);pure wick disconn for mobil.  -DM       Row Name 12/22/24 1059          Transfers    Comment, (Transfers) impulsively attempted pulling up w/ AD vs using armrests as cued for HP/seq;2 STS trials; init. flexed posture,but corrected w/ counter-pressure to sacrum to shift forw over C of G  -DM       Row Name 12/22/24 1059          Sit-Stand Transfer    Sit-Stand [place] (Transfers) verbal cues;minimum assist (75% patient effort)  -DM     Assistive Device (Sit-Stand Transfers) walker, front-wheeled  -DM       Row Name 12/22/24 1059          Gait/Stairs (Locomotion)    [place] Level (Gait) verbal cues;contact guard  PT propelled IV; 3 Laps in room, w/ 3 brief stand.rests; HR 65; desat 92%;onset min.fatigue & incr SOB  -DM     Distance in Feet (Gait) 90  -DM     Deviations/Abnormal Patterns (Gait) bilateral deviations;liz decreased;stride length decreased;base of support, wide  Decr step length; init stepped wide on 1st turn,then corrected  -DM     Bilateral Gait Deviations forward flexed posture;heel strike decreased  gazes @ floor;incr flexed posture  -DM     Comment, (Gait/Stairs) cues for incr step length, avoiding wide turns outside RAFAEL,trunk ext/focusing ahead, & PLB exer;sat. recovered to 93% upon sitting, & HR 59  -DM               User Key  (r) = Recorded By, (t) = Taken By, (c) = Cosigned By      Initials Name Provider Type    DM Dinora Wilkerson, PT Physical Therapist                   Obj/Interventions       Row Name 12/22/24 1059          Range of Motion Comprehensive    General Range of Motion lower extremity range of motion deficits  identified  -DM     Comment, General Range of Motion B Hips stringer. 15-20% d/t OA  -DM       Row Name 12/22/24 1059          Strength Comprehensive (MMT)    General Manual Muscle Testing (MMT) Assessment lower extremity strength deficits identified  -DM     Comment, General Manual Muscle Testing (MMT) Assessment donavan LE grossly 3- to 4/5  -DM       Row Name 12/22/24 1059          Motor Skills    Therapeutic Exercise shoulder;hip;knee;ankle  issued HEP w/ illustrations, & instructed w/ family present, who will perform w/ pt 3x/day  -DM       Row Name 12/22/24 1059          Shoulder (Therapeutic Exercise)    Shoulder (Therapeutic Exercise) AROM (active range of motion)  -DM     Shoulder AROM (Therapeutic Exercise) bilateral;flexion;extension;aBduction;aDduction;horizontal aBduction/aDduction;sitting;10 repetitions;other (see comments)  biceps curls; deep inspir. w/ shldr exer;min.A w/ abd.initially  -DM       Row Name 12/22/24 1059          Hip (Therapeutic Exercise)    Hip (Therapeutic Exercise) AROM (active range of motion);isometric exercises  -DM     Hip AROM (Therapeutic Exercise) bilateral;flexion;extension;aBduction;aDduction;external rotation;internal rotation;sitting;10 repetitions  -DM     Hip Isometrics (Therapeutic Exercise) bilateral;gluteal sets;sitting;10 repetitions  -DM       Row Name 12/22/24 1059          Knee (Therapeutic Exercise)    Knee (Therapeutic Exercise) AROM (active range of motion);isometric exercises  -DM     Knee AROM (Therapeutic Exercise) bilateral;flexion;extension;SAQ (short arc quad);LAQ (long arc quad);heel slides;sitting;10 repetitions  -DM     Knee Isometrics (Therapeutic Exercise) bilateral;quad sets;sitting;10 repetitions  def. HS d/t fatigue  -DM       Row Name 12/22/24 1059          Ankle (Therapeutic Exercise)    Ankle (Therapeutic Exercise) AROM (active range of motion);AAROM (active assistive range of motion)  -DM     Ankle AROM (Therapeutic Exercise)  bilateral;dorsiflexion;plantarflexion;sitting;10 repetitions;2 sets  -DM     Ankle AAROM (Therapeutic Exercise) bilateral;sitting;10 repetitions;other (see comments)  AC  -DM       Row Name 12/22/24 1059          Balance    Static Sitting Balance supervision  -DM     Dynamic Sitting Balance standby assist;verbal cues  recip scooting  -DM     Position, Sitting Balance unsupported;sitting in chair  -DM     Static Standing Balance contact guard  -DM     Dynamic Standing Balance contact guard  -DM     Position/Device Used, Standing Balance walker, rolling  -DM     Balance Interventions sitting;standing;static;dynamic;weight shifting activity  -DM               User Key  (r) = Recorded By, (t) = Taken By, (c) = Cosigned By      Initials Name Provider Type    DM Dinora Wilkerson, PT Physical Therapist                   Goals/Plan       Row Name 12/22/24 1059          Bed Mobility Goal 1 (PT)    Activity/Assistive Device (Bed Mobility Goal 1, PT) bed mobility activities, all  -DM     [place] Level/Cues Needed (Bed Mobility Goal 1, PT) supervision required  -DM     Time Frame (Bed Mobility Goal 1, PT) short term goal (STG);3 days  -DM       Row Name 12/22/24 1059          Transfer Goal 1 (PT)    Activity/Assistive Device (Transfer Goal 1, PT) sit-to-stand/stand-to-sit;bed-to-chair/chair-to-bed;walker, rolling  -DM     [place] Level/Cues Needed (Transfer Goal 1, PT) supervision required  -DM     Time Frame (Transfer Goal 1, PT) long term goal (LTG);1 week  -DM       Row Name 12/22/24 1059          Gait Training Goal 1 (PT)    Activity/Assistive Device (Gait Training Goal 1, PT) gait (walking locomotion);assistive device use;walker, rolling  Stable VS; O2 sat > 92%  -DM     [place] Level (Gait Training Goal 1, PT) contact guard required  -DM     Distance (Gait Training Goal 1, PT) 150  -DM     Time Frame (Gait Training Goal 1, PT) long term goal (LTG);1 week  -DM       Row Name 12/22/24 1059          Stairs  Goal 1 (PT)    Activity/Assistive Device (Stairs Goal 1, PT) ascending stairs;descending stairs;assistive device use  -DM     [place] Level/Cues Needed (Stairs Goal 1, PT) contact guard required  -DM     Number of Stairs (Stairs Goal 1, PT) 1  -DM     Time Frame (Stairs Goal 1, PT) long term goal (LTG);1 week  -DM       Row Name 12/22/24 1059          Patient Education Goal (PT)    Activity (Patient Education Goal, PT) HEP exer  -DM     [place]/Cues/Accuracy (Memory Goal 2, PT) demonstrates adequately;verbalizes understanding  -DM     Time Frame (Patient Education Goal, PT) long term goal (LTG);1 week  -DM       Row Name 12/22/24 1059          Therapy Assessment/Plan (PT)    Planned Therapy Interventions (PT) bed mobility training;gait training;home exercise program;postural re-education;patient/family education;stair training;strengthening;transfer training  -DM               User Key  (r) = Recorded By, (t) = Taken By, (c) = Cosigned By      Initials Name Provider Type    DM Dinora Wilkerson, PT Physical Therapist                   Clinical Impression       Row Name 12/22/24 1059          Pain    Pretreatment Pain Rating 0/10 - no pain  -DM     Posttreatment Pain Rating 0/10 - no pain  -DM       Row Name 12/22/24 1059          Plan of Care Review    Plan of Care Reviewed With patient;spouse;child;grandchild(mili)  -DM     Progress improving  -DM     Outcome Evaluation PT eval completed. Presents w/ elev trop, NSTEMI, PNA, Influenza (droplet prec.), recent fall, prev. cor.stent (YAMILETH) 9-12-24, decr LE strength/endurance & impaired funct mobil below baseline. STS x 2 trials w/ FWW & min A, amb 90 ft w/ FWW & CGA w/ 3 brief stand.rests, & performed TE per issued HEP w/ family participating. Noted min.SOB/desat 92% on RA, ramu at rest (HR 65 w/ gt), & incr.. Recommend home w/ family's assist & HHPT f/u at d/c.  -DM       Row Name 12/22/24 1059          Therapy Assessment/Plan (PT)    Patient/Family  Therapy Goals Statement (PT) improved funct mobil  -DM     Rehab Potential (PT) good  -DM     Criteria for Skilled Interventions Met (PT) yes;meets criteria;skilled treatment is necessary  -DM     Therapy Frequency (PT) daily  -DM     Predicted Duration of Therapy Intervention (PT) 7 days  -DM       Row Name 12/22/24 1059          Vital Signs    Pre Systolic BP Rehab 119  -DM     Pre Treatment Diastolic BP 72  -DM     Post Systolic BP Rehab 129  -DM     Post Treatment Diastolic BP 69  -DM     Pretreatment Heart Rate (beats/min) 52  -DM     Intratreatment Heart Rate (beats/min) 65  -DM     Posttreatment Heart Rate (beats/min) 59  -DM     Pre SpO2 (%) 97  -DM     O2 Delivery Pre Treatment room air  -DM     Intra SpO2 (%) 92  -DM     O2 Delivery Intra Treatment room air  -DM     Post SpO2 (%) 93  -DM     O2 Delivery Post Treatment room air  -DM     Pre Patient Position Sitting  -DM     Intra Patient Position Standing  -DM     Post Patient Position Sitting  -DM     Rest Breaks  3  -DM       Row Name 12/22/24 1059          Positioning and Restraints    Pre-Treatment Position sitting in chair/recliner  -DM     Post Treatment Position chair  -DM     In Chair notified nsg;call light within reach;reclined;encouraged to call for assist;exit alarm on;with family/caregiver;waffle cushion;legs elevated  -DM               User Key  (r) = Recorded By, (t) = Taken By, (c) = Cosigned By      Initials Name Provider Type    Dinora Merlos, PT Physical Therapist                   Outcome Measures       Row Name 12/22/24 1059 12/22/24 1000       How much help from another person do you currently need...    Turning from your back to your side while in flat bed without using bedrails? 3  -DM 3  -AH    Moving from lying on back to sitting on the side of a flat bed without bedrails? 3  -DM 3  -AH    Moving to and from a bed to a chair (including a wheelchair)? 3  -DM 2  -AH    Standing up from a chair using your arms (e.g., wheelchair,  bedside chair)? 3  -DM 3  -AH    Climbing 3-5 steps with a railing? 2  -DM 3  -AH    To walk in hospital room? 3  -DM 3  -AH    AM-PAC 6 Clicks Score (PT) 17  -DM 17  -AH    Highest Level of Mobility Goal 5 --> Static standing  -DM 5 --> Static standing  -AH      Row Name 12/22/24 1113 12/22/24 1059       Functional Assessment    Outcome Measure Options AM-PAC 6 Clicks Daily Activity (OT)  -KL AM-PAC 6 Clicks Basic Mobility (PT)  -DM              User Key  (r) = Recorded By, (t) = Taken By, (c) = Cosigned By      Initials Name Provider Type    DM Dinora Wilkerson, PT Physical Therapist    Suzy Rey, RN Registered Nurse    Briana Garza, OT Occupational Therapist                                 Physical Therapy Education       Title: PT OT SLP Therapies (In Progress)       Topic: Physical Therapy (In Progress)       Point: Mobility training (In Progress)       Learning Progress Summary            Patient Eager, E,D,H, NR by DM at 12/22/2024 1245   Family Eager, E,D,H, NR by DM at 12/22/2024 1245   Significant Other Eager, E,D,H, NR by DM at 12/22/2024 1245                      Point: Home exercise program (In Progress)       Learning Progress Summary            Patient Eager, E,D,H, NR by DM at 12/22/2024 1245   Family Eager, E,D,H, NR by DM at 12/22/2024 1245   Significant Other Eager, E,D,H, NR by DM at 12/22/2024 1245                      Point: Body mechanics (In Progress)       Learning Progress Summary            Patient Eager, E,D,H, NR by DM at 12/22/2024 1245   Family Eager, E,D,H, NR by DM at 12/22/2024 1245   Significant Other Eager, E,D,H, NR by DM at 12/22/2024 1245                      Point: Precautions (In Progress)       Learning Progress Summary            Patient Eager, E,D,H, NR by DM at 12/22/2024 1245   Family Eager, E,D,H, NR by DM at 12/22/2024 1245   Significant Other Eager, E,D,H, NR by DM at 12/22/2024 3158                                      User Key       Initials Effective  Dates Name Provider Type Discipline    DM 02/03/23 -  Dinora Wilkerson, PT Physical Therapist PT                  PT Recommendation and Plan  Planned Therapy Interventions (PT): bed mobility training, gait training, home exercise program, postural re-education, patient/family education, stair training, strengthening, transfer training  Progress: improving  Outcome Evaluation: PT eval completed. Presents w/ elev trop, NSTEMI, PNA, Influenza (droplet prec.), recent fall, prev. cor.stent (YAMILETH) 9-12-24, decr LE strength/endurance & impaired funct mobil below baseline. STS x 2 trials w/ FWW & min A, amb 90 ft w/ FWW & CGA w/ 3 brief stand.rests, & performed TE per issued HEP w/ family participating. Noted min.SOB/desat 92% on RA, ramu at rest (HR 65 w/ gt), & incr.. Recommend home w/ family's assist & HHPT f/u at d/c.     Time Calculation:   PT Evaluation Complexity  History, PT Evaluation Complexity: 3 or more personal factors and/or comorbidities  Examination of Body Systems (PT Eval Complexity): total of 3 or more elements  Clinical Presentation (PT Evaluation Complexity): evolving  Clinical Decision Making (PT Evaluation Complexity): moderate complexity  Overall Complexity (PT Evaluation Complexity): moderate complexity     PT Charges       Row Name 12/22/24 1246             Time Calculation    Start Time 1059  -DM      PT Received On 12/22/24  -DM      PT Goal Re-Cert Due Date 01/01/25  -DM         Timed Charges    08911 - PT Therapeutic Exercise Minutes 18  -DM      51567 - Gait Training Minutes  16  -DM      19197 - PT Therapeutic Activity Minutes 12  -DM         Untimed Charges    PT Eval/Re-eval Minutes 60  -DM         Total Minutes    Timed Charges Total Minutes 46  -DM      Untimed Charges Total Minutes 60  -DM       Total Minutes 106  -DM                User Key  (r) = Recorded By, (t) = Taken By, (c) = Cosigned By      Initials Name Provider Type    DM Dinora Wilkerson, PT Physical Therapist                   Therapy Charges for Today       Code Description Service Date Service Provider Modifiers Qty    40662393911  PT THER PROC EA 15 MIN 12/22/2024 Dinora Wilkerson, PT GP 1    73981994883 HC GAIT TRAINING EA 15 MIN 12/22/2024 Dinora Wilkerson, PT GP 1    30570260199 HC PT THERAPEUTIC ACT EA 15 MIN 12/22/2024 Dinora Wilkerson, PT GP 1    25376239373 HC PT EVAL MOD COMPLEXITY 4 12/22/2024 Dinora Wilkerson, PT GP 1            PT G-Codes  Outcome Measure Options: AM-PAC 6 Clicks Daily Activity (OT)  AM-PAC 6 Clicks Score (PT): 17  AM-PAC 6 Clicks Score (OT): 21  PT Discharge Summary  Anticipated Discharge Disposition (PT): home with assist, home with home health    Dinora Wilkerson, PT  12/22/2024

## 2024-12-22 NOTE — PLAN OF CARE
Goal Outcome Evaluation:  Plan of Care Reviewed With: patient, spouse, child, grandchild(mili)        Progress: improving  Outcome Evaluation: PT eval completed. Presents w/ elev trop, NSTEMI, PNA, Influenza (droplet prec.), recent fall, prev. cor.stent (YAMILETH) 9-12-24, decr LE strength/endurance & impaired funct mobil below baseline. STS x 2 trials w/ FWW & min A, amb 90 ft w/ FWW & CGA w/ 3 brief stand.rests, & performed TE per issued HEP w/ family participating. Noted min.SOB/desat 92% on RA, ramu at rest (HR 65 w/ gt), & incr.. Recommend home w/ family's assist & HHPT f/u at d/c.    Anticipated Discharge Disposition (PT): home with assist, home with home health

## 2024-12-22 NOTE — PROGRESS NOTES
[facility] Medicine Services  PROGRESS NOTE    Patient Name: Albin Andino  : 1940  MRN: [id number]    Date of Admission: 2024  Primary Care Physician: Ariela Brannon,     Subjective   Subjective     CC:  Weakness, chest pain    HPI:  Resting in a chair in no acute distress and tells me he feels okay.  Patient's daughter is also present at the bedside.  Denies any fever or chills.  No chest pain or palpitation at this point although earlier today patient had very mild left chest discomfort.  No nausea vomiting or diarrhea.      Objective   Objective     Vital Signs:   Temp:  [97.4 °F (36.3 °C)-99 °F (37.2 °C)] 98 °F (36.7 °C)  Heart Rate:  [52-81] 52  Resp:  [18-20] 18  BP: (119-170)/() 119/72  Flow (L/min) (Oxygen Therapy):  [2] 2     Physical Exam:  Constitutional: No acute distress, looks comfortable  HENT: NCAT, mucous membranes moist  Respiratory: Clear to auscultation bilaterally, respiratory effort normal   Cardiovascular: RRR, no murmurs, rubs, or gallops  Gastrointestinal: Positive bowel sounds, soft, nontender, nondistended  Musculoskeletal: No bilateral ankle edema  Psychiatric: Appropriate affect, cooperative  Neurologic: Oriented x 3,  speech clear  Skin: No rashes     Results Reviewed:  LAB RESULTS:      Lab 24  1139 24  0531 24  0234 24  2329   WBC  --   --   --  3.99   HEMOGLOBIN  --   --   --  13.9   HEMATOCRIT  --   --   --  41.3   PLATELETS  --   --   --  98*   NEUTROS ABS  --   --   --  2.86   IMMATURE GRANS (ABS)  --   --   --  0.01   LYMPHS ABS  --   --   --  0.57*   MONOS ABS  --   --   --  0.54   EOS ABS  --   --   --  0.01   MCV  --   --   --  87.9   PROCALCITONIN  --   --   --  0.09   LACTATE  --   --   --  1.5   PROTIME  --  14.2  --   --    APTT  --  33.0*  --   --    HEPARIN ANTI-XA 0.33  --   --  0.10*   HSTROP T  --  175* 219* 293*         Lab 24  0854   SODIUM 140   POTASSIUM 4.4   CHLORIDE 101    CO2 28.0   ANION GAP 11.0   BUN 12   CREATININE 0.74*   EGFR 88.2   GLUCOSE 100*   CALCIUM 8.9         Lab 12/21/24  2329   TOTAL PROTEIN 6.6   ALBUMIN 4.1   GLOBULIN 2.5   ALT (SGPT) 21   AST (SGOT) 42*   BILIRUBIN 1.0   ALK PHOS 96         Lab 12/22/24  0531 12/22/24  0234 12/21/24  2329   HSTROP T 175* 219* 293*   PROTIME 14.2  --   --    INR 1.09  --   --                  Brief Urine Lab Results       None            Microbiology Results Abnormal       Procedure Component Value - Date/Time    COVID-19, FLU A/B, RSV PCR 1 HR TAT - Swab, Nasopharynx [[id number]]  (Abnormal) Collected: 12/22/24 0716    Lab Status: Final result Specimen: Swab from Nasopharynx Updated: 12/22/24 0848     COVID19 Not Detected     Influenza A PCR Detected     Influenza B PCR Not Detected     RSV, PCR Not Detected    Narrative:      Fact sheet for providers: https://www.fda.gov/media/997250/download    Fact sheet for patients: https://www.fda.gov/media/675925/download    Test performed by PCR.            XR Chest 1 View    Result Date: 12/22/2024  XR CHEST 1 VW Date of Exam: 12/22/2024 4:25 AM EST Indication: DYSPNEA, CARDIAC ORIGIN SUSPECTED hypoxia and cough Comparison: None available. Findings: Patient is status post median sternotomy. Heart size is at the upper limits of normal. Pulmonary vessels are somewhat indistinct compatible pulmonary vascular congestion. Basilar interstitial markings are also prominent suggesting mild interstitial edema. No focal airspace consolidation. No pleural effusion or pneumothorax.     Impression: Impression: 1. Borderline heart size with pulmonary vascular congestion and probable mild interstitial edema. No focal airspace consolidation. Electronically Signed: Teddy Jones MD  12/22/2024 9:17 AM EST  Workstation ID: [id number]    XR Outside Films    Result Date: 12/21/2024  This procedure was auto-finalized with no dictation required.         Current medications:  Scheduled Meds:ascorbic acid, 500 mg,  Oral, Daily  aspirin, 81 mg, Oral, Daily  atorvastatin, 20 mg, Oral, Nightly  azithromycin, 250 mg, Oral, Q24H  cefTRIAXone, 1,000 mg, Intravenous, Q24H  cholecalciferol, 1,000 Units, Oral, Daily  clopidogrel, 75 mg, Oral, Daily  folic acid, 400 mcg, Oral, Daily  gabapentin, 1,200 mg, Oral, Nightly  ipratropium-albuterol, 3 mL, Nebulization, 4x Daily - RT  lisinopril, 2.5 mg, Oral, Q24H  lubiprostone, 24 mcg, Oral, BID  metoprolol succinate XL, 25 mg, Oral, Daily  multivitamin and minerals, 15 mL, Oral, Daily  pantoprazole, 40 mg, Oral, BID  cyanocobalamin, 500 mcg, Oral, Daily  vitamin B-6, 100 mg, Oral, Daily      Continuous Infusions:heparin, 12 Units/kg/hr, Last Rate: 12 Units/kg/hr (12/22/24 0530)  Pharmacy to Dose Heparin,       PRN Meds:.  acetaminophen    guaifenesin    nitroglycerin    Pharmacy to Dose Heparin    Assessment & Plan   Assessment & Plan     Active Hospital Problems    Diagnosis  POA    **Pneumonia [J18.9]  Yes    Esophageal stricture [K22.2]  Yes    Troponin level elevated [R79.89]  Yes    Chronic HFrEF [I50.22]  Yes    Coronary artery disease involving native coronary artery of native heart with angina pectoris [I25.119]  Yes    Essential hypertension [I10]  Yes    Hyperlipidemia LDL goal <70 [E78.5]  Yes    Restless legs syndrome [G25.81]  Yes      Resolved Hospital Problems   No resolved problems to display.        Brief Hospital Course to date:  Albin Andino is a 84 y.o. male with past medical history significant for hypertension, hyperlipidemia, arthritis, atrial fibrillation, coronary artery disease with recent stent placement by Dr. Cruz, BPH, esophageal stricture s/p esophageal dilatation about 2 to 3 years ago.  Patient presented to the local hospital for complaints of generalized weakness, cough, low-grade fever, falls.  Patient was transferred to [facility] for higher level of care.      Acute hypoxic respiratory failure  Influenza A  --Patient gives a history  of recent fever and cough.  Chest x-ray does not show any evidence of consolidation.  However, it is suggestive of some vascular congestion.  As mentioned above patient also is positive for influenza A and that explains the respiratory symptoms.  However, patient tells me that the respiratory symptoms have improved significantly and he is coughing a lot less.  On physical exam patient did not have any cough.  Currently patient is saturating well    Elevated troponin and chest discomfort  Recent stent placement  -NSTEMI, probably type II and currently also on heparin.  Patient is currently chest pain-free.  Cardiology has seen and evaluated patient.     CAD with recent stents 9/12/24  Chronic HFrEF  Mitral regurgitation/Aortic Stenosis  -Cardiology has seen and evaluated patient.  They recommend continuation of dual anti-platelet treatment.       HLP  -cont statin    Esophageal stricture  -Patient has had esophageal stretching about couple of years ago.  Tells me that after the procedure still has episodes of solid food getting stuck in his throat but it resolves after chasing the food with liquid.  There has been no new changes.         Expected Discharge Location and Transportation: Home  Expected Discharge in a day or 2  Expected Discharge Date: 12/24/2024; Expected Discharge Time:      VTE Prophylaxis:  Pharmacologic VTE prophylaxis orders are present.         AM-PAC 6 Clicks Score (PT): 17 (12/22/24 7401)    CODE STATUS:   Code Status and Medical Interventions: CPR (Attempt to Resuscitate); Full Support   Ordered at: 12/21/24 4378     Code Status (Patient has no pulse and is not breathing):    CPR (Attempt to Resuscitate)     Medical Interventions (Patient has pulse or is breathing):    Full Support       Jeramie Priest MD  12/22/24

## 2024-12-22 NOTE — CONSULTS
"[facility] Cardiology  Consultation H&P    Patient: Albin Andino  1940    [address] [place] [facility] [address]    PCP:  Ariela Brannon DO   Treatment Team:   Attending Provider: Jeramie Priest MD  Consulting Physician: Fidel Peñaloza MD  Admitting Provider: Jeramie Priest MD   12/21/2024    Primary cardiologist: Susanna    DATE OF CONSULTATION: 12/22/2024 11:30 EST     IDENTIFICATION: A 84 y.o. male     REASON FOR CONSULTATION: Chest pain, Recent cath, CHF    PROBLEM LIST:    Pneumonia    Essential hypertension    Hyperlipidemia LDL goal <70    Restless legs syndrome    Coronary artery disease involving native coronary artery of native heart with angina pectoris    Esophageal stricture    Troponin level elevated    Chronic HFrEF      Past Medical History:   Diagnosis Date    Arthritis     Atrial fibrillation 06/20/2016    Dr. Mullins      Chronic HFrEF (heart failure with reduced ejection fraction)     Coronary artery disease     Elevated LFTs, with hyperbilirubinemia, due to above  08/27/2016    Enlarged prostate     Heart disease     History of transfusion     Hyperlipidemia     Hypertension     Normocytic anemia 08/28/2016     Past Surgical History:   Procedure Laterality Date    BACK SURGERY      CARDIAC CATHETERIZATION Right 9/12/2024    Procedure: Stent YAMILETH bypass graft;  Surgeon: Niels Cruz IV, MD;  Location:  [facility];  Service: Cardiovascular;  Laterality: Right;    CARDIAC SURGERY      CHOLECYSTECTOMY N/A 08/28/2016    Procedure: CHOLECYSTECTOMY LAPAROSCOPIC WITH INTRA-OPERATIVE CHOLANGIOGRAM;  Surgeon: Rogelio Sanchez MD;  Location:  [facility];  Service:     CORONARY STENT PLACEMENT      HERNIA REPAIR      KNEE SURGERY      PROSTATE SURGERY      THROAT SURGERY         Allergies  Allergies   Allergen Reactions    Hydrocodone Confusion     Pt reports that it \"makes him go crazy\"    Hydrocodone-Acetaminophen Confusion and " Other (See Comments)    Morphine And Codeine Other (See Comments)       Home Medications:  Current Outpatient Medications   Medication Instructions    aspirin 81 mg, Oral, Daily    atorvastatin (LIPITOR) 20 mg, Oral, Nightly    benazepril (LOTENSIN) 2.5 mg, Oral, Daily    cholecalciferol (VITAMIN D3) 1,000 Units, 2 Times Daily    clopidogrel (PLAVIX) 75 mg, Oral, Daily    cyanocobalamin (VITAMIN B-12) 500 mcg, Daily    folic acid (FOLVITE) 400 mcg, Daily    gabapentin (NEURONTIN) 600 MG tablet take 1 tablet by mouth every morning and take 2 tablets by mouth every evening    Linzess 72 MCG capsule capsule 1 capsule, Daily    metoprolol succinate XL (TOPROL-XL) 25 mg, Oral, Daily    Multiple Vitamins-Minerals (ICAPS AREDS 2) capsule 1 capsule, 2 Times Daily    nitroglycerin (NITROSTAT) 0.4 mg, Oral, Every 5 Minutes PRN    pantoprazole (PROTONIX) 40 MG EC tablet 1 tablet, 2 Times Daily    vitamin B-6 (PYRIDOXINE) 100 mg, Daily    Vitamin C 500 mg, 2 Times Daily        Current Medications  Scheduled Meds:  ascorbic acid, 500 mg, Oral, Daily  aspirin, 81 mg, Oral, Daily  atorvastatin, 20 mg, Oral, Nightly  azithromycin, 250 mg, Oral, Q24H  cefTRIAXone, 1,000 mg, Intravenous, Q24H  cholecalciferol, 1,000 Units, Oral, Daily  [Held by provider] clopidogrel, 75 mg, Oral, Daily  folic acid, 400 mcg, Oral, Daily  gabapentin, 1,200 mg, Oral, Nightly  ipratropium-albuterol, 3 mL, Nebulization, 4x Daily - RT  lisinopril, 2.5 mg, Oral, Q24H  lubiprostone, 24 mcg, Oral, BID  metoprolol succinate XL, 25 mg, Oral, Daily  multivitamin and minerals, 15 mL, Oral, Daily  pantoprazole, 40 mg, Oral, BID  cyanocobalamin, 500 mcg, Oral, Daily  vitamin B-6, 100 mg, Oral, Daily      Continuous Infusions:  heparin, 12 Units/kg/hr, Last Rate: 12 Units/kg/hr (12/22/24 0530)  Pharmacy to Dose Heparin,       PRN Meds:    acetaminophen    guaifenesin    nitroglycerin    Pharmacy to Dose Heparin      History of Present Illness   Albin Andino is a  84 y.o. year old male with a past medical history significant for high risk to saphenous vein coronary bypass intervention   Began feeling weak on Thursday and commented to family that he was having some twinges of chest symptoms in the morning.  He had been doing his recumbent bike every day without issue however.  Presented to the emergency department yesterday and was transferred given elevated troponin  Patient states that he did take his dual antiplatelet yesterday      ROS  Review of Systems   Constitutional: Positive for malaise/fatigue.   Cardiovascular:  Positive for chest pain.       SOCIAL HX  Social History     Socioeconomic History    Marital status:     Number of children: 2   Tobacco Use    Smoking status: Former     Current packs/day: 0.00     Average packs/day: 1 pack/day for 27.0 years (27.0 ttl pk-yrs)     Types: Cigarettes     Start date:      Quit date:      Years since quittin.0     Passive exposure: Past    Smokeless tobacco: Never   Vaping Use    Vaping status: Never Used   Substance and Sexual Activity    Alcohol use: Yes     Alcohol/week: 1.0 standard drink of alcohol     Types: 1 Standard drinks or equivalent per week     Comment: OCCASIONAL    Drug use: No    Sexual activity: Defer       FAMILY HX  Family History   Problem Relation Age of Onset    Hypertension Mother     Stroke Mother     Heart attack Father     Diabetes Father        OBJECTIVE:  Vitals:    24 0645 24 0700 24 0755 24 1051   BP: 139/77 141/81  119/72   BP Location: Left arm   Left arm   Patient Position: Lying   Sitting   Pulse: 68 65  52   Resp: 18  18 18   Temp: 97.8 °F (36.6 °C)   98 °F (36.7 °C)   TempSrc: Oral   Oral   SpO2: 97% 97%  95%   Weight:       Height:         I/O last 3 completed shifts:  In: -   Out: 600 [Urine:600]  I/O this shift:  In: 450 [P.O.:450]  Out: -   Intake & Output (last 3 days)          07  0701  12/22 0700 12/22 0701  12/23 0700    P.O.    450    Total Intake(mL/kg)    450 (5.5)    Urine (mL/kg/hr)   600     Total Output   600     Net   -600 +450                     PHYSICAL EXAMINATION:  Constitutional:       Appearance: Not in distress.   Neck:      Vascular: No JVR. JVD normal.   Pulmonary:      Effort: Pulmonary effort is normal.      Breath sounds: No wheezing. Rhonchi present. No rales.   Chest:      Chest wall: Not tender to palpatation.   Cardiovascular:      PMI at left midclavicular line. Normal rate. Regular rhythm. Normal S1. Normal S2.       Murmurs: There is a systolic murmur.      No gallop.  No click. No rub.   Pulses:     Intact distal pulses.   Edema:     Peripheral edema absent.   Abdominal:      General: Bowel sounds are normal.      Palpations: Abdomen is soft.      Tenderness: There is no abdominal tenderness.   Musculoskeletal: Normal range of motion.         General: No tenderness. Skin:     General: Skin is warm and dry.   Neurological:      General: No focal deficit present.      Mental Status: Alert and oriented to person, place and time.         Diagnostic Data:  Lab Results (last 24 hours)       Procedure Component Value Units Date/Time    COVID-19, FLU A/B, RSV PCR 1 HR TAT - Swab, Nasopharynx [[id number]]  (Abnormal) Collected: 12/22/24 0716    Specimen: Swab from Nasopharynx Updated: 12/22/24 0848     COVID19 Not Detected     Influenza A PCR Detected     Influenza B PCR Not Detected     RSV, PCR Not Detected    Narrative:      Fact sheet for providers: https://www.fda.gov/media/780672/download    Fact sheet for patients: https://www.fda.gov/media/701495/download    Test performed by PCR.    S. Pneumo Ag Urine or CSF - Urine, Urine, Clean Catch [[id number]] Collected: 12/22/24 0025    Specimen: Urine, Clean Catch Updated: 12/22/24 0630    Legionella Antigen, Urine - Urine, Urine, Clean Catch [[id number]] Collected: 12/22/24 0025    Specimen: Urine, Clean Catch Updated:  12/22/24 0630    High Sensitivity Troponin T [[id number]]  (Abnormal) Collected: 12/22/24 0531    Specimen: Blood Updated: 12/22/24 0626     HS Troponin T 175 ng/L     Narrative:      High Sensitive Troponin T Reference Range:  <14.0 ng/L- Negative Female for AMI  <22.0 ng/L- Negative Male for AMI  >=14 - Abnormal Female indicating possible myocardial injury.  >=22 - Abnormal Male indicating possible myocardial injury.   Clinicians would have to utilize clinical acumen, EKG, Troponin, and serial changes to determine if it is an Acute Myocardial Infarction or myocardial injury due to an underlying chronic condition.         Protime-INR [[id number]]  (Normal) Collected: 12/22/24 0531    Specimen: Blood Updated: 12/22/24 0624     Protime 14.2 Seconds      INR 1.09    aPTT [[id number]]  (Abnormal) Collected: 12/22/24 0531    Specimen: Blood Updated: 12/22/24 0624     PTT 33.0 seconds     Narrative:      PTT = The equivalent PTT values for the therapeutic range of heparin levels at 0.3 to 0.5 U/ml are 60 to 70 seconds.    POC Glucose Once [[id number]]  (Abnormal) Collected: 12/22/24 0517    Specimen: Blood Updated: 12/22/24 0522     Glucose 131 mg/dL     High Sensitivity Troponin T 1Hr [[id number]]  (Abnormal) Collected: 12/22/24 0234    Specimen: Blood Updated: 12/22/24 0349     HS Troponin T 219 ng/L      Troponin T Numeric Delta -74 ng/L      Troponin T % Delta -25 %     Narrative:      High Sensitive Troponin T Reference Range:  <14.0 ng/L- Negative Female for AMI  <22.0 ng/L- Negative Male for AMI  >=14 - Abnormal Female indicating possible myocardial injury.  >=22 - Abnormal Male indicating possible myocardial injury.   Clinicians would have to utilize clinical acumen, EKG, Troponin, and serial changes to determine if it is an Acute Myocardial Infarction or myocardial injury due to an underlying chronic condition.         Lactic Acid, Plasma [[id number]]  (Normal) Collected: 12/21/24 8914    Specimen: Blood Updated:  "12/22/24 0219     Lactate 1.5 mmol/L      Comment: Falsely depressed results may occur on samples drawn from patients receiving N-Acetylcysteine (NAC) or Metamizole.       Cortisol [[id number]] Collected: 12/21/24 2329    Specimen: Blood Updated: 12/22/24 0148     Cortisol 10.41 mcg/dL     Narrative:      Cortisol Reference Ranges:    Cortisol 6AM - 10AM Range: 6.02-18.40 mcg/dl  Cortisol 4PM - 8PM Range: 2.68-10.50 mcg/dl      Results may be falsely increased if patient taking Biotin.      High Sensitivity Troponin T [[id number]]  (Abnormal) Collected: 12/21/24 2329    Specimen: Blood Updated: 12/22/24 0123     HS Troponin T 293 ng/L     Narrative:      High Sensitive Troponin T Reference Range:  <14.0 ng/L- Negative Female for AMI  <22.0 ng/L- Negative Male for AMI  >=14 - Abnormal Female indicating possible myocardial injury.  >=22 - Abnormal Male indicating possible myocardial injury.   Clinicians would have to utilize clinical acumen, EKG, Troponin, and serial changes to determine if it is an Acute Myocardial Infarction or myocardial injury due to an underlying chronic condition.         Procalcitonin [[id number]]  (Normal) Collected: 12/21/24 2329    Specimen: Blood Updated: 12/22/24 0116     Procalcitonin 0.09 ng/mL     Narrative:      As a Marker for Sepsis (Non-Neonates):    1. <0.5 ng/mL represents a low risk of severe sepsis and/or septic shock.  2. >2 ng/mL represents a high risk of severe sepsis and/or septic shock.    As a Marker for Lower Respiratory Tract Infections that require antibiotic therapy:    PCT on Admission    Antibiotic Therapy       6-12 Hrs later    >0.5                Strongly Recommended  >0.25 - <0.5        Recommended   0.1 - 0.25          Discouraged              Remeasure/reassess PCT  <0.1                Strongly Discouraged     Remeasure/reassess PCT    As 28 day mortality risk marker: \"Change in Procalcitonin Result\" (>80% or <=80%) if Day 0 (or Day 1) and Day 4 values are " available. Refer to http://www.[facility]-pct-calculator.com    Change in PCT <=80%  A decrease of PCT levels below or equal to 80% defines a positive change in PCT test result representing a higher risk for 28-day all-cause mortality of patients diagnosed with severe sepsis for septic shock.    Change in PCT >80%  A decrease of PCT levels of more than 80% defines a negative change in PCT result representing a lower risk for 28-day all-cause mortality of patients diagnosed with severe sepsis or septic shock.       CBC & Differential [[id number]]  (Abnormal) Collected: 12/21/24 2329    Specimen: Blood Updated: 12/22/24 0113    Narrative:      The following orders were created for panel order CBC & Differential.  Procedure                               Abnormality         Status                     ---------                               -----------         ------                     CBC Auto Differential[[id number]]        Abnormal            Final result                 Please view results for these tests on the individual orders.    CBC Auto Differential [[id number]]  (Abnormal) Collected: 12/21/24 2329    Specimen: Blood Updated: 12/22/24 0113     WBC 3.99 10*3/mm3      RBC 4.70 10*6/mm3      Hemoglobin 13.9 g/dL      Hematocrit 41.3 %      MCV 87.9 fL      MCH 29.6 pg      MCHC 33.7 g/dL      RDW 13.7 %      RDW-SD 44.1 fl      MPV 10.3 fL      Platelets 98 10*3/mm3      Neutrophil % 71.6 %      Lymphocyte % 14.3 %      Monocyte % 13.5 %      Eosinophil % 0.3 %      Basophil % 0.0 %      Immature Grans % 0.3 %      Neutrophils, Absolute 2.86 10*3/mm3      Lymphocytes, Absolute 0.57 10*3/mm3      Monocytes, Absolute 0.54 10*3/mm3      Eosinophils, Absolute 0.01 10*3/mm3      Basophils, Absolute 0.00 10*3/mm3      Immature Grans, Absolute 0.01 10*3/mm3      nRBC 0.0 /100 WBC     Comprehensive Metabolic Panel [[id number]]  (Abnormal) Collected: 12/21/24 2329    Specimen: Blood Updated: 12/22/24 0112     Glucose 100 mg/dL       BUN 12 mg/dL      Creatinine 0.74 mg/dL      Sodium 140 mmol/L      Potassium 4.4 mmol/L      Chloride 101 mmol/L      CO2 28.0 mmol/L      Calcium 8.9 mg/dL      Total Protein 6.6 g/dL      Albumin 4.1 g/dL      ALT (SGPT) 21 U/L      AST (SGOT) 42 U/L      Alkaline Phosphatase 96 U/L      Total Bilirubin 1.0 mg/dL      Globulin 2.5 gm/dL      Comment: Calculated Result        A/G Ratio 1.6 g/dL      BUN/Creatinine Ratio 16.2     Anion Gap 11.0 mmol/L      eGFR 88.2 mL/min/1.73     Narrative:      GFR Categories in Chronic Kidney Disease (CKD)      GFR Category          GFR (mL/min/1.73)    Interpretation  G1                     90 or greater         Normal or high (1)  G2                      60-89                Mild decrease (1)  G3a                   45-59                Mild to moderate decrease  G3b                   30-44                Moderate to severe decrease  G4                    15-29                Severe decrease  G5                    14 or less           Kidney failure          (1)In the absence of evidence of kidney disease, neither GFR category G1 or G2 fulfill the criteria for CKD.    eGFR calculation 2021 CKD-EPI creatinine equation, which does not include race as a factor    Heparin Anti-Xa [[id number]]  (Abnormal) Collected: 12/21/24 2538    Specimen: Blood Updated: 12/22/24 0056     Heparin Anti-Xa (UFH) 0.10 IU/ml           ECG/EMG Results (last 24 hours)       ** No results found for the last 24 hours. **         Results for orders placed during the hospital encounter of 09/12/24    Cardiac Catheterization/Vascular Study    Conclusion    Successful high risk PCI of degenerated sequential SVG to RPDA/RPL--proximal and distal segments--using large-caliber drug-eluting stents.  Residual stenosis of vein graft supplying RPL not treated    Successful high risk PCI of degenerated SVG to OM with large caliber YAMILETH       ASSESSMENT/PLAN:    Pneumonia    Essential hypertension    Hyperlipidemia  LDL goal <70    Restless legs syndrome    Coronary artery disease involving native coronary artery of native heart with angina pectoris    Esophageal stricture    Troponin level elevated    Chronic HFrEF    - CONTINUE dual antiplatelet therapy post 2 vessel high risk PCI  In setting of non-STEMI, albeit likely type II            Scribed for Fidel Peñaloza MD by Mayuri Charlton, APRN. 12/22/2024  11:30 EST

## 2024-12-23 ENCOUNTER — READMISSION MANAGEMENT (OUTPATIENT)
Dept: CALL CENTER | Facility: HOSPITAL | Age: 84
End: 2024-12-23
Payer: MEDICARE

## 2024-12-23 VITALS
BODY MASS INDEX: 24.33 KG/M2 | OXYGEN SATURATION: 95 % | WEIGHT: 179.6 LBS | TEMPERATURE: 98 F | HEIGHT: 72 IN | HEART RATE: 56 BPM | SYSTOLIC BLOOD PRESSURE: 138 MMHG | RESPIRATION RATE: 16 BRPM | DIASTOLIC BLOOD PRESSURE: 71 MMHG

## 2024-12-23 PROBLEM — I50.30 HEART FAILURE WITH PRESERVED LEFT VENTRICULAR FUNCTION (HFPEF): Status: ACTIVE | Noted: 2024-12-21

## 2024-12-23 PROBLEM — I21.4 NSTEMI (NON-ST ELEVATED MYOCARDIAL INFARCTION): Status: ACTIVE | Noted: 2024-12-21

## 2024-12-23 LAB
BASOPHILS # BLD AUTO: 0 10*3/MM3 (ref 0–0.2)
BASOPHILS NFR BLD AUTO: 0 % (ref 0–1.5)
DEPRECATED RDW RBC AUTO: 44.1 FL (ref 37–54)
EOSINOPHIL # BLD AUTO: 0.05 10*3/MM3 (ref 0–0.4)
EOSINOPHIL NFR BLD AUTO: 1.3 % (ref 0.3–6.2)
ERYTHROCYTE [DISTWIDTH] IN BLOOD BY AUTOMATED COUNT: 13.8 % (ref 12.3–15.4)
GEN 5 1HR TROPONIN T REFLEX: 202 NG/L
HCT VFR BLD AUTO: 37.9 % (ref 37.5–51)
HGB BLD-MCNC: 12.6 G/DL (ref 13–17.7)
IMM GRANULOCYTES # BLD AUTO: 0.01 10*3/MM3 (ref 0–0.05)
IMM GRANULOCYTES NFR BLD AUTO: 0.3 % (ref 0–0.5)
LYMPHOCYTES # BLD AUTO: 0.75 10*3/MM3 (ref 0.7–3.1)
LYMPHOCYTES NFR BLD AUTO: 19.7 % (ref 19.6–45.3)
MCH RBC QN AUTO: 28.9 PG (ref 26.6–33)
MCHC RBC AUTO-ENTMCNC: 33.2 G/DL (ref 31.5–35.7)
MCV RBC AUTO: 86.9 FL (ref 79–97)
MONOCYTES # BLD AUTO: 0.4 10*3/MM3 (ref 0.1–0.9)
MONOCYTES NFR BLD AUTO: 10.5 % (ref 5–12)
NEUTROPHILS NFR BLD AUTO: 2.6 10*3/MM3 (ref 1.7–7)
NEUTROPHILS NFR BLD AUTO: 68.2 % (ref 42.7–76)
NRBC BLD AUTO-RTO: 0 /100 WBC (ref 0–0.2)
NT-PROBNP SERPL-MCNC: 1333 PG/ML (ref 0–1800)
PLATELET # BLD AUTO: 107 10*3/MM3 (ref 140–450)
PMV BLD AUTO: 10.1 FL (ref 6–12)
RBC # BLD AUTO: 4.36 10*6/MM3 (ref 4.14–5.8)
TROPONIN T % DELTA: 11 %
TROPONIN T NUMERIC DELTA: 20 NG/L
TROPONIN T SERPL HS-MCNC: 182 NG/L
UFH PPP CHRO-ACNC: 0.1 IU/ML (ref 0.3–0.7)
UFH PPP CHRO-ACNC: 0.19 IU/ML (ref 0.3–0.7)
UFH PPP CHRO-ACNC: 0.61 IU/ML (ref 0.3–0.7)
WBC NRBC COR # BLD AUTO: 3.81 10*3/MM3 (ref 3.4–10.8)

## 2024-12-23 PROCEDURE — 85025 COMPLETE CBC W/AUTO DIFF WBC: CPT | Performed by: INTERNAL MEDICINE

## 2024-12-23 PROCEDURE — 84484 ASSAY OF TROPONIN QUANT: CPT | Performed by: INTERNAL MEDICINE

## 2024-12-23 PROCEDURE — 25010000002 HEPARIN (PORCINE) PER 1000 UNITS

## 2024-12-23 PROCEDURE — 94799 UNLISTED PULMONARY SVC/PX: CPT

## 2024-12-23 PROCEDURE — 92610 EVALUATE SWALLOWING FUNCTION: CPT

## 2024-12-23 PROCEDURE — 25010000002 FUROSEMIDE PER 20 MG: Performed by: INTERNAL MEDICINE

## 2024-12-23 PROCEDURE — 94664 DEMO&/EVAL PT USE INHALER: CPT

## 2024-12-23 PROCEDURE — 99232 SBSQ HOSP IP/OBS MODERATE 35: CPT | Performed by: NURSE PRACTITIONER

## 2024-12-23 PROCEDURE — 83880 ASSAY OF NATRIURETIC PEPTIDE: CPT | Performed by: NURSE PRACTITIONER

## 2024-12-23 PROCEDURE — 85520 HEPARIN ASSAY: CPT

## 2024-12-23 PROCEDURE — 99239 HOSP IP/OBS DSCHRG MGMT >30: CPT | Performed by: INTERNAL MEDICINE

## 2024-12-23 RX ORDER — HEPARIN SODIUM 1000 [USP'U]/ML
4000 INJECTION, SOLUTION INTRAVENOUS; SUBCUTANEOUS ONCE
Status: COMPLETED | OUTPATIENT
Start: 2024-12-23 | End: 2024-12-23

## 2024-12-23 RX ADMIN — FOLIC ACID TAB 400 MCG 400 MCG: 400 TAB at 08:46

## 2024-12-23 RX ADMIN — LUBIPROSTONE 24 MCG: 24 CAPSULE, GELATIN COATED ORAL at 08:47

## 2024-12-23 RX ADMIN — ASCORBIC ACID, THIAMINE, RIBOFLAVIN, NIACINAMIDE, PYRIDOXINE, FOLIC ACID, COBALAMIN, BIOTIN, PANTOTHENIC ACID 15 ML: 100; 1.5; 1.7; 20; 10; 1; 6; 300; 1 TABLET, COATED ORAL at 08:47

## 2024-12-23 RX ADMIN — Medication 1000 UNITS: at 08:46

## 2024-12-23 RX ADMIN — FUROSEMIDE 20 MG: 10 INJECTION, SOLUTION INTRAMUSCULAR; INTRAVENOUS at 08:46

## 2024-12-23 RX ADMIN — HEPARIN SODIUM 4000 UNITS: 1000 INJECTION INTRAVENOUS; SUBCUTANEOUS at 00:54

## 2024-12-23 RX ADMIN — OXYCODONE HYDROCHLORIDE AND ACETAMINOPHEN 500 MG: 500 TABLET ORAL at 08:46

## 2024-12-23 RX ADMIN — CYANOCOBALAMIN TAB 1000 MCG 500 MCG: 1000 TAB at 08:45

## 2024-12-23 RX ADMIN — LISINOPRIL 2.5 MG: 2.5 TABLET ORAL at 08:46

## 2024-12-23 RX ADMIN — CLOPIDOGREL BISULFATE 75 MG: 75 TABLET ORAL at 08:46

## 2024-12-23 RX ADMIN — PANTOPRAZOLE SODIUM 40 MG: 40 TABLET, DELAYED RELEASE ORAL at 08:46

## 2024-12-23 RX ADMIN — ASPIRIN 81 MG: 81 TABLET, COATED ORAL at 08:45

## 2024-12-23 RX ADMIN — IPRATROPIUM BROMIDE AND ALBUTEROL SULFATE 3 ML: 2.5; .5 SOLUTION RESPIRATORY (INHALATION) at 13:22

## 2024-12-23 RX ADMIN — PYRIDOXINE HCL TAB 50 MG 100 MG: 50 TAB at 08:46

## 2024-12-23 RX ADMIN — METOPROLOL SUCCINATE 25 MG: 25 TABLET, EXTENDED RELEASE ORAL at 08:45

## 2024-12-23 NOTE — PLAN OF CARE
Goal Outcome Evaluation:  Plan of Care Reviewed With: patient        Progress: improving  Outcome Evaluation: Pt is A&O with VSS, sinus ramu to NSR on the monitor, and on RA to 2L NC when asleep. Heparin gtt is now infusing at 14 units/kg/hr. He complained of some pain in his legs that were relieved with PRN pain medication. He slept well last night. There are no other complaints at this time.

## 2024-12-23 NOTE — DISCHARGE PLACEMENT REQUEST
"Carmine Whiten (84 y.o. Male)   Lexx Obrien, RN Case MAnager  [phone]      Date of Birth   1940    Social Security Number       Address   [address] [place] [id number]    Home Phone   [phone]    MRN   [id number]       Yarsanism   Druze    Marital Status                               Admission Date   24    Admission Type   Urgent    Admitting Provider   Jeramie Priest MD    Attending Provider   Jeramie Priest MD    Department, Room/Bed   [address], [address]/1       Discharge Date       Discharge Disposition       Discharge Destination                                 Attending Provider: Jeramie Priest MD    Allergies: Hydrocodone, Hydrocodone-acetaminophen, Morphine And Codeine    Isolation: Droplet   Infection: Influenza (24)   Code Status: CPR    Ht: 182.8 cm (71.97\")   Wt: 81.5 kg (179 lb 9.6 oz)    Admission Cmt: None   Principal Problem: Pneumonia [J18.9]                   Active Insurance as of 2024       Primary Coverage       Payor Plan Insurance Group Employer/Plan Group    [facility] MEDICARE REPLACEMENT [facility] MED ADV GROUP [id number]       Payor Plan Address Payor Plan Phone Number Payor Plan Fax Number Effective Dates    [address] [address]   2024 - None Entered    [facility] [id number]         Subscriber Name Subscriber Birth Date Member ID       CARMINE WHITE 1940 [id number]                     Emergency Contacts        (Rel.) Home Phone Work Phone Mobile Phone    Loraine White (Spouse) [phone] -- [phone]    Josefa Dunaway (Daughter) [phone] -- [phone]          [address]  [address] [facility] [address]  Phone:  [phone]  Fax:  [phone] Date: Dec 23, 2024      Ambulatory Referral to Home Health     Patient:  Carmine White MRN:  [id number]   [address] [facility] [address] :  1940  SSN:    Phone: " [phone] Sex:  M      INSURANCE PAYOR PLAN GROUP # SUBSCRIBER ID   Primary:    [employer] MEDICARE REPLACEMENT [id number]      Referring Provider Information:  CHELSEA BECERRA Phone: [phone] Fax: [phone]       Referral Information:   # Visits:  999 Referral Type: Home Health [42]   Urgency:  Routine Referral Reason: Specialty Services Required   Start Date: Dec 23, 2024 End Date:  To be determined by Insurer   Diagnosis: Chronic HFrEF (heart failure with reduced ejection fraction) (I50.22 [ICD-10-CM] 428.22 [ICD-9-CM])  NSTEMI (non-ST elevated myocardial infarction) (I21.4 [ICD-10-CM] 410.70 [ICD-9-CM])  Esophageal stricture (K22.2 [ICD-10-CM] 530.3 [ICD-9-CM])  Thrombocytopenia (D69.6 [ICD-10-CM] 287.5 [ICD-9-CM])  Pneumonia due to influenza A virus (J10.00 [ICD-10-CM] 487.0 [ICD-9-CM])      Refer to Dept:   Refer to Provider:   Refer to Provider Phone:   Refer to Facility:       Face to Face Visit Date: 12/23/2024  Follow-up provider for Plan of Care? I will be treating the patient on an ongoing basis.  Please send me the Plan of Care for signature.  Follow-up provider: ILIANA ALICEA [[id number]]  Reason/Clinical Findings: Pneumonia  Describe mobility limitations that make leaving home difficult: Impaired mobility  Nursing/Therapeutic Services Requested: Skilled Nursing  Nursing/Therapeutic Services Requested: Physical Therapy  Nursing/Therapeutic Services Requested: Occupational Therapy  Skilled nursing orders: Medication education  Skilled nursing orders: Pain management  Skilled nursing orders: Cardiopulmonary assessments  PT orders: Therapeutic exercise  PT orders: Gait Training  PT orders: Strengthening  PT orders: Home safety assessment  Weight Bearing Status: As Tolerated  Occupational orders: Activities of daily living  Occupational orders: Energy conservation  Occupational orders: Strengthening  Occupational orders: Home safety assessment  Frequency: 1 Week 1      This document serves as a request of services and does not constitute Insurance authorization or approval of services.  To determine eligibility, please contact the members Insurance carrier to verify and review coverage.     If you have medical questions regarding this request for services. Please contact [address] at [phone] during normal business hours.        Authorizing Provider:Jeramie Priest MD  Authorizing Provider's NPI: [id number]  Order Entered By: Yoan Obrien RN 2024 11:28 AM     Electronically signed by: Jeramie Priest MD 2024 11:28 AM            History & Physical        Julianne Acevedo MD at 24 192              [facility] Medicine Services  HISTORY AND PHYSICAL    Patient Name: Albin Andino  : 1940  MRN: [id number]  Primary Care Physician: Ariela Brannon DO    Subjective  Subjective     Chief Complaint:weakness    HPI:  Albin Andino is a 84 y.o. male who  has a past medical history of Arthritis, Atrial fibrillation (2016), Coronary artery disease with HO , Enlarged prostate, History of transfusion, Hyperlipidemia, Hypertension, and Normocytic anemia (2016). who presented to [facility] with 3 days of weakness, a few falls, cough, low grade fever. He was found to have pneumonia with a considerable bump in his troponin and  transferred to [facility] for cardiology evaluation.  Patient denies current chest pain and hasn't had any for days however upon presentation to the OSH his BP was less than 80 and he responded to IVFs. He is now requiring 2 LPM of oxygen. HE reports some increasing difficulty with food getting stuck and he has to force it up- even cornbread in milk.    Otherwise complete ROS is negative except as mentioned in the HPI.    Personal History     PMH: He  has a past medical history of Arthritis, Atrial fibrillation (2016), Chronic HFrEF (heart failure with  "reduced ejection fraction), Coronary artery disease, Elevated LFTs, with hyperbilirubinemia, due to above  (08/27/2016), Enlarged prostate, Heart disease, History of transfusion, Hyperlipidemia, Hypertension, and Normocytic anemia (08/28/2016).   PSxH: He  has a past surgical history that includes Cardiac surgery; Back surgery; Hernia repair; Knee surgery; Prostate surgery; Throat surgery; Cholecystectomy (N/A, 08/28/2016); Coronary stent placement; and Cardiac catheterization (Right, 9/12/2024).         FH: His family history includes Diabetes in his father; Heart attack in his father; Hypertension in his mother; Stroke in his mother.   SH: He  reports that he quit smoking about 40 years ago. His smoking use included cigarettes. He started smoking about 67 years ago. He has a 27 pack-year smoking history. He has been exposed to tobacco smoke. He has never used smokeless tobacco. He reports current alcohol use of about 1.0 standard drink of alcohol per week. He reports that he does not use drugs.     Medications:  ICaps Areds 2, Vitamin C, aspirin, atorvastatin, benazepril, cholecalciferol, clopidogrel, cyanocobalamin, folic acid, gabapentin, linaclotide, metoprolol succinate XL, nitroglycerin, pantoprazole, and vitamin B-6    Allergies   Allergen Reactions    Hydrocodone Confusion     Pt reports that it \"makes him go crazy\"    Hydrocodone-Acetaminophen Confusion and Other (See Comments)    Morphine And Codeine Other (See Comments)       Objective  Objective     Vital Signs:   Temp:  [99 °F (37.2 °C)] 99 °F (37.2 °C)  Heart Rate:  [81] 81  Resp:  [20] 20  BP: (159)/(97) 159/97  Flow (L/min) (Oxygen Therapy):  [2] 2    Constitutional: Awake, alert, interactive and pleasant, warm to touch  Eyes: clear sclerae, no conjunctival injection  HENT: NCAT, mucous membranes dry  Neck: no masses or lymphadenopathy, trachea midline  Respiratory: coarse breath sounds bilaterally, respirations unlabored  Cardiovascular: RRR, " diastolic murmur appreciated, palpable peripheral pulses, no edema  Abdomen:  soft, no HSM or masses palpable, not tender or distended  Musculoskeletal: No peripheral edema, clubbing or cyanosis  Neurologic: Oriented x 3,                       Strength symmetric in all extremities                     Cranial Nerves grossly intact, speech clear  Skin: No rashes or jaundice  Psychiatric: Appropriate mood, insight      Result Review:  I have personally reviewed the results from the time of this admission   to 12/22/2024 03:35 EST and agree with these findings:  [x]  Laboratory  [x]  Microbiology  []  Radiology  [x]  EKG/Telemetry   [x]  Cardiology/Vascular   []  Pathology  []  Old records  []  Other:  Most notable findings include:   CXR: with LLL infiltrate   [facility] 9/12/24     Successful high risk PCI of degenerated sequential SVG to RPDA/RPL--proximal and distal segments--using large-caliber drug-eluting stents.  Residual stenosis of vein graft supplying RPL not treated    Successful high risk PCI of degenerated SVG to OM with large caliber YAMILETH  Left Main   Ost LM to Prox LAD lesion is 100% stenosed. YU flow is 0.      Left Circumflex   Ost Cx lesion is 100% stenosed. YU flow is 0.      Right Coronary Artery   Prox RCA to Dist RCA lesion is 100% stenosed. YU flow is 0.      Sequential Graft To RPDA, 2nd RPL   Origin to Prox Graft lesion before RPDA is 95% stenosed. Culprit lesion. YU flow is 3. The lesion is type C. The lesion is severely calcified.   Dist Graft lesion before RPDA is 95% stenosed. Culprit lesion. YU flow is 3. The lesion is type C. The lesion is severely calcified.   Prox Graft to Mid Graft lesion between RPDA and 2nd RPL is 90% stenosed.      Graft To 1st Mrg   Origin to Prox Graft lesion is 95% stenosed. UY flow is 3. The lesion is type C. The lesion is severely calcified.   Previously placed Prox Graft to Mid Graft stent (unknown type) is widely patent.      LIMA Graft To Mid LAD     "    LAB RESULTS:      Lab 12/21/24  2329   WBC 3.99   HEMOGLOBIN 13.9   HEMATOCRIT 41.3   PLATELETS 98*   NEUTROS ABS 2.86   IMMATURE GRANS (ABS) 0.01   LYMPHS ABS 0.57*   MONOS ABS 0.54   EOS ABS 0.01   MCV 87.9   PROCALCITONIN 0.09   LACTATE 1.5   HEPARIN ANTI-XA 0.10*         Lab 12/21/24  2329   SODIUM 140   POTASSIUM 4.4   CHLORIDE 101   CO2 28.0   ANION GAP 11.0   BUN 12   CREATININE 0.74*   EGFR 88.2   GLUCOSE 100*   CALCIUM 8.9         Lab 12/21/24  2329   TOTAL PROTEIN 6.6   ALBUMIN 4.1   GLOBULIN 2.5   ALT (SGPT) 21   AST (SGOT) 42*   BILIRUBIN 1.0   ALK PHOS 96         Lab 12/21/24  2329   HSTROP T 293*                 Brief Urine Lab Results       None          No results found for: \"COVID19\"    XR Outside Films    Result Date: 12/21/2024  This procedure was auto-finalized with no dictation required.   Echo 9/6/24    1. Left ventricle: The cavity size is mildly dilated. Wall      thickness is increased. Systolic function is mildly reduced. The      estimated ejection fraction is 40-45%. Moderate diffuse      hypokinesis. Doppler parameters are consistent with abnormal      left ventricular relaxation (grade 1 diastolic dysfunction).   2. Left atrium: The atrium is mildly dilated.   3. Atrial septum: No defect or patent foramen ovale is identified.   4. Mitral valve: There is moderate to severe regurgitation.   5. Aortic valve: Thickening, consistent with sclerosis. The      findings are consistent with mild to moderate stenosis. There is      mild to moderate regurgitation.   6. Tricuspid valve: There is mild-moderate regurgitation.       The patient has started, but not completed, their COVID-19 vaccination series.    Assessment & Plan  Assessment / Plan       Pneumonia    Esophageal stricture    Troponin level elevated    Essential hypertension    Hyperlipidemia LDL goal <70    Restless legs syndrome    Coronary artery disease involving native coronary artery of native heart with angina pectoris    " Chronic HFrEF      Assessment & Plan:  Mr Andino is an 85 yo with PMH of HTN, HLP, RLS, esophageal dilation, complicated CAD and recent stent by Dr Cruz who presented to [facility]  who weakness and found to have pneumonia and quite elevated troponin    Pneumonia possible aspiration  Acute hypoxic respiratory failure  -- cont rocephin and zithromax  -- oxygen  - one dose of steroids  -- duonebs  --check urine antigens  -increase pulmonary toilet  -ask GI to see for possible esophageal dilation (plavix on hold)    CAD with recent stents 9/12/24  Chronic HFrEF  Mitral regurgitation/Aortic Stenosis  -cont aspirin, hold plavix -- for ?EGD?-- restart asap  --hold aspirin as no angina and no ischemia on EKG    HLP  -cont statin    VTE Prophylaxis:HEP SQ    CODE STATUS:  Code Status (Patient has no pulse and is not breathing): CPR (Attempt to Resuscitate)  Medical Interventions (Patient has pulse or is breathing): Full Support    Expected Discharge  Expected Discharge Date: 12/24/2024; Expected Discharge Time:     Electronically signed by Julianne Acevedo MD 12/22/24 03:35 EST            Electronically signed by Julianne Acevedo MD at 12/22/24 0350       Vital Signs (last day)       Date/Time Temp Temp src Pulse Resp BP Patient Position SpO2    12/23/24 1127 98 (36.7) Oral 57 16 138/71 Lying 95    12/23/24 0700 -- -- 59 -- -- -- 97    12/23/24 0644 97.7 (36.5) Oral 53 18 132/69 Lying 97    12/23/24 0634 -- -- 51 -- -- -- 92    12/23/24 0455 97.6 (36.4) Oral 71 18 127/68 Lying 97    12/23/24 0400 -- -- 49 -- -- -- 95    12/23/24 0210 -- -- 53 -- -- -- 94    12/23/24 0125 -- -- 57 -- -- -- 99    12/23/24 0124 -- -- 70 -- -- -- 81    12/23/24 0123 -- -- 67 -- -- -- 89    12/23/24 0121 -- -- 67 -- -- -- 91    12/23/24 0120 -- -- 56 -- -- -- 80    12/23/24 0119 -- -- 59 -- -- -- 79    12/23/24 0118 -- -- 72 -- -- -- 87    12/23/24 0100 -- -- 54 -- -- -- 93    12/23/24 0049 97.8 (36.6) Oral 63 18 123/67 Sitting 94     12/23/24 0000 -- -- 69 -- -- -- 96    12/22/24 2230 -- -- 67 -- -- -- 92    12/22/24 2048 -- -- 67 -- -- -- 93    12/22/24 1923 -- -- 56 18 -- -- 94    12/22/24 1910 97.7 (36.5) Oral 62 18 118/68 Lying 95    12/22/24 1608 -- -- -- 18 -- -- --    12/22/24 1551 98 (36.7) Oral 63 18 121/72 Lying --    12/22/24 1212 -- -- 55 18 -- -- 94    12/22/24 1051 98 (36.7) Oral 52 18 119/72 Sitting 95    12/22/24 0755 -- -- -- 18 -- -- --    12/22/24 0700 -- -- 65 -- 141/81 -- 97    12/22/24 0645 97.8 (36.6) Oral 68 18 139/77 Lying 97    12/22/24 0600 -- -- 63 -- -- -- 96    12/22/24 0555 -- -- 62 -- 127/75 -- 95    12/22/24 0550 -- -- 69 -- 130/73 -- 93    12/22/24 0545 -- -- 71 -- 132/80 -- 94    12/22/24 0540 -- -- 75 -- 131/76 -- 95    12/22/24 0535 -- -- 76 -- 131/82 -- 95    12/22/24 0530 -- -- 77 -- 135/101 -- 93    12/22/24 0525 -- -- 75 -- 132/87 -- 95    12/22/24 0520 -- -- 81 -- 142/91 -- 94    12/22/24 0510 -- -- 81 -- 151/92 -- --    12/22/24 0500 -- -- -- -- 151/92 -- --    12/22/24 0419 97.4 (36.3) Oral 78 20 148/91 Lying 98    12/22/24 0351 -- -- 72 20 170/76 Lying 95          Current Facility-Administered Medications   Medication Dose Route Frequency Provider Last Rate Last Admin    acetaminophen (TYLENOL) tablet 650 mg  650 mg Oral Q6H PRN Julianne Acevedo MD   650 mg at 12/22/24 2047    ascorbic acid (VITAMIN C) tablet 500 mg  500 mg Oral Daily Julianne Acevedo MD   500 mg at 12/23/24 0846    aspirin EC tablet 81 mg  81 mg Oral Daily Julianne Acevedo MD   81 mg at 12/23/24 0845    atorvastatin (LIPITOR) tablet 20 mg  20 mg Oral Nightly Julianne Acevedo MD   20 mg at 12/22/24 2104    azithromycin (ZITHROMAX) tablet 250 mg  250 mg Oral Q24H Julianne Acevedo MD   250 mg at 12/22/24 1425    cefTRIAXone (ROCEPHIN) 1,000 mg in sodium chloride 0.9 % 100 mL MBP  1,000 mg Intravenous Q24H Julianne Acevedo  mL/hr at 12/22/24 2048 1,000 mg at 12/22/24 2048    cholecalciferol (VITAMIN D3) tablet 1,000 Units   1,000 Units Oral Daily Julianne Acevedo MD   1,000 Units at 12/23/24 0846    clopidogrel (PLAVIX) tablet 75 mg  75 mg Oral Daily Fidel Peñaloza MD   75 mg at 12/23/24 0846    folic acid (FOLVITE) tablet 400 mcg  400 mcg Oral Daily Julianne Acevedo MD   400 mcg at 12/23/24 0846    gabapentin (NEURONTIN) capsule 1,200 mg  1,200 mg Oral Nightly Jeramie Priest MD   1,200 mg at 12/22/24 2047    guaifenesin (ROBITUSSIN) 100 MG/5ML liquid 200 mg  200 mg Oral Q4H PRN Julianne Acevedo MD   200 mg at 12/22/24 0011    ipratropium-albuterol (DUO-NEB) nebulizer solution 3 mL  3 mL Nebulization 4x Daily - RT Julianne Acevedo MD   3 mL at 12/22/24 1922    lisinopril (PRINIVIL,ZESTRIL) tablet 2.5 mg  2.5 mg Oral Q24H Julianne Acevedo MD   2.5 mg at 12/23/24 0846    lubiprostone (AMITIZA) capsule 24 mcg  24 mcg Oral BID Julianne Acevedo MD   24 mcg at 12/23/24 0847    metoprolol succinate XL (TOPROL-XL) 24 hr tablet 25 mg  25 mg Oral Daily Julianne Acevedo MD   25 mg at 12/23/24 0845    multivitamin and minerals liquid 15 mL  15 mL Oral Daily Julianne Acevedo MD   15 mL at 12/23/24 0847    nitroglycerin (NITROSTAT) SL tablet 0.4 mg  0.4 mg Sublingual Q5 Min PRN Julianne Acevedo MD   0.4 mg at 12/22/24 0517    pantoprazole (PROTONIX) EC tablet 40 mg  40 mg Oral BID Julianne Acevedo MD   40 mg at 12/23/24 0846    vitamin B-12 (CYANOCOBALAMIN) tablet 500 mcg  500 mcg Oral Daily Julianne Acevedo MD   500 mcg at 12/23/24 0845    vitamin B-6 (PYRIDOXINE) tablet 100 mg  100 mg Oral Daily Julianne Acevedo MD   100 mg at 12/23/24 0846        Physician Progress Notes (last 24 hours)        Elena Finn APRN at 12/23/24 0801          Cardiology Progress Note      Reason for visit:    Chest pain  Chronic systolic heart failure    IDENTIFICATION: 84-year-old gentleman who resides in [place], [place]    Active Hospital Problems    Diagnosis  POA    **Pneumonia [J18.9]  Yes     Priority: High    Type II NSTEMI from  influenza [I21.4]  Yes     Priority: High    Chronic HFrEF [I50.22]  Yes     Priority: High    Coronary artery disease involving native coronary artery of native heart with angina pectoris [I25.119]  Yes     Priority: Medium     CABG in the 1980s  PCI 2006 - 2007  Cardiac catheterization (9/6/2024): Occluded native circulation.  Patent LIMA to LAD.  Severely degenerated SVG to RCA and SVG to OM  Cardiac catheterization (9/16/2024): Successful high risk PCI of degenerated sequential SVG to RPDA/RPL--proximal and distal segments--using large-caliber drug-eluting stents.  Residual stenosis of vein graft supplying RPL not treated. Successful high risk PCI of degenerated SVG to OM with large caliber YAMILETH      Essential hypertension [I10]  Yes     Priority: Medium     Target blood pressure <130/80 mmHg      Esophageal stricture [K22.2]  Yes     Priority: Low    Hyperlipidemia LDL goal <70 [E78.5]  Yes     Priority: Low    Restless legs syndrome [G25.81]  Yes     Priority: Low       Subjective     Patient is sitting up in the bed breathing easy on room air.  The patient did have an episode of chest discomfort yesterday and EKG performed was unchanged from prior EKG and no acute ischemic changes.  He has not had any further chest pain..  His complaints on admission were dizziness and weakness.  IV heparin drip is infusing.        Objective   Vital Sign Min/Max for last 24 hours  Temp  Min: 97.6 °F (36.4 °C)  Max: 98 °F (36.7 °C)   BP  Min: 118/68  Max: 132/69   Pulse  Min: 49  Max: 72   Resp  Min: 18  Max: 18   SpO2  Min: 79 %  Max: 99 %   Flow (L/min) (Oxygen Therapy)  Min: 2  Max: 2      Intake/Output Summary (Last 24 hours) at 12/23/2024 0802  Last data filed at 12/23/2024 0253  Gross per 24 hour   Intake --   Output 375 ml   Net -375 ml           Physical Exam  Constitutional:       General: He is awake.   Cardiovascular:      Rate and Rhythm: Normal rate and regular rhythm.      Heart sounds: No murmur  heard.  Pulmonary:      Effort: Pulmonary effort is normal.      Breath sounds: Wheezing and rhonchi present.   Musculoskeletal:      Right lower leg: No edema.      Left lower leg: No edema.   Skin:     General: Skin is warm and dry.   Neurological:      Mental Status: He is alert.   Psychiatric:         Behavior: Behavior is cooperative.         Tele: Normal sinus rhythm    Results Review (reviewed the patient's recent labs in the electronic medical record):     EKG (12/21/2024):     CT of the chest (12/22/2024): Nonspecific interstitial opacities present bilaterally.  Suspect underlying interstitial lung disease and/or mild interstitial edema.  Nonspecific groundglass opacities within the left upper and lower lobes.  Atypical infiltrates cannot be excluded.  Mild bilateral lower lobe bronchiectasis.    ECHO (12/22/2024): LVEF 51-55%.  Grade 2 diastolic dysfunction.  No significant valvular abnormality    Results from last 7 days   Lab Units 12/21/24  2329   SODIUM mmol/L 140   POTASSIUM mmol/L 4.4   CHLORIDE mmol/L 101   BUN mg/dL 12   CREATININE mg/dL 0.74*     Results from last 7 days   Lab Units 12/22/24  0531 12/22/24  0234 12/21/24  2329   HSTROP T ng/L 175* 219* 293*     Results from last 7 days   Lab Units 12/22/24  2322 12/21/24  2329   WBC 10*3/mm3 4.30 3.99   HEMOGLOBIN g/dL 13.1 13.9   HEMATOCRIT % 38.6 41.3   PLATELETS 10*3/mm3 115* 98*       Lab Results   Component Value Date    HGBA1C 4.50 08/28/2016       Lab Results   Component Value Date    CHOL 96 08/28/2016    CHLPL 138 06/15/2020    TRIG 121 06/15/2020    HDL 41 06/15/2020    LDL 73 06/15/2020         Assessment     Type II NSTEMI from influenza and pneumonia  Troponins elevated but flat and trending down at 293, 219 and 175.  EKG no acute ischemic changes  Positive for influenza A  Troponins trending down  Defer any ischemic evaluation as scenario more consistent with type II NSTEMI from influenza and pneumonia  IV heparin drip  infusing    Coronary artery disease  Status post CABG in the  with multiple PCI's.  Recent high risk PCI of sequential SVG to RPDA/RPL using drug-eluting stents..  High risk PCI of degenerated SVG to OM 2024  DAPT for recent PCI with aspirin and Plavix      Chronic systolic heart failure  Echo this admission shows normal LVEF and grade 2 diastolic dysfunction  Guideline directed medical therapy with metoprolol succinate, lisinopril  20 mg IV Lasix daily      Hypertension  Current /69    Hyperlipidemia  Lipitor 20 mg daily      Plan     Scenario more consistent with type II NSTEMI from influenza A and pneumonia.  Echocardiogram showed normal LVEF and no wall abnormality.  Discontinue IV heparin and discontinue IV Lasix  Continue DAPT, statin, lisinopril and metoprolol succinate  From cardiac standpoint he is okay for discharge home.  He can follow-up in the office in 4 weeks for reassessment once he has recovered from pneumonia and influenza.  Please call cardiology with any further questions    Electronically signed by ABDIAZIZ Cruz, 24, 8:02 AM EST.    Electronically signed by Elena Finn APRN at 24 1022       Jeramie Priest MD at 24 1502              [facility] Medicine Services  PROGRESS NOTE    Patient Name: Albin Andino  : 1940  MRN: [id number]    Date of Admission: 2024  Primary Care Physician: Ariela Brannon,     Subjective   Subjective     CC:  Weakness, chest pain    HPI:  Resting in a chair in no acute distress and tells me he feels okay.  Patient's daughter is also present at the bedside.  Denies any fever or chills.  No chest pain or palpitation at this point although earlier today patient had very mild left chest discomfort.  No nausea vomiting or diarrhea.      Objective   Objective     Vital Signs:   Temp:  [97.4 °F (36.3 °C)-99 °F (37.2 °C)] 98 °F (36.7 °C)  Heart Rate:  [52-81] 52  Resp:  [18-20]  18  BP: (119-170)/() 119/72  Flow (L/min) (Oxygen Therapy):  [2] 2     Physical Exam:  Constitutional: No acute distress, looks comfortable  HENT: NCAT, mucous membranes moist  Respiratory: Clear to auscultation bilaterally, respiratory effort normal   Cardiovascular: RRR, no murmurs, rubs, or gallops  Gastrointestinal: Positive bowel sounds, soft, nontender, nondistended  Musculoskeletal: No bilateral ankle edema  Psychiatric: Appropriate affect, cooperative  Neurologic: Oriented x 3,  speech clear  Skin: No rashes     Results Reviewed:  LAB RESULTS:      Lab 12/22/24  1139 12/22/24  0531 12/22/24  0234 12/21/24  2329   WBC  --   --   --  3.99   HEMOGLOBIN  --   --   --  13.9   HEMATOCRIT  --   --   --  41.3   PLATELETS  --   --   --  98*   NEUTROS ABS  --   --   --  2.86   IMMATURE GRANS (ABS)  --   --   --  0.01   LYMPHS ABS  --   --   --  0.57*   MONOS ABS  --   --   --  0.54   EOS ABS  --   --   --  0.01   MCV  --   --   --  87.9   PROCALCITONIN  --   --   --  0.09   LACTATE  --   --   --  1.5   PROTIME  --  14.2  --   --    APTT  --  33.0*  --   --    HEPARIN ANTI-XA 0.33  --   --  0.10*   HSTROP T  --  175* 219* 293*         Lab 12/21/24  2329   SODIUM 140   POTASSIUM 4.4   CHLORIDE 101   CO2 28.0   ANION GAP 11.0   BUN 12   CREATININE 0.74*   EGFR 88.2   GLUCOSE 100*   CALCIUM 8.9         Lab 12/21/24  2329   TOTAL PROTEIN 6.6   ALBUMIN 4.1   GLOBULIN 2.5   ALT (SGPT) 21   AST (SGOT) 42*   BILIRUBIN 1.0   ALK PHOS 96         Lab 12/22/24  0531 12/22/24  0234 12/21/24  2329   HSTROP T 175* 219* 293*   PROTIME 14.2  --   --    INR 1.09  --   --                  Brief Urine Lab Results       None            Microbiology Results Abnormal       Procedure Component Value - Date/Time    COVID-19, FLU A/B, RSV PCR 1 HR TAT - Swab, Nasopharynx [[id number]]  (Abnormal) Collected: 12/22/24 0716    Lab Status: Final result Specimen: Swab from Nasopharynx Updated: 12/22/24 0848     COVID19 Not Detected     Influenza  A PCR Detected     Influenza B PCR Not Detected     RSV, PCR Not Detected    Narrative:      Fact sheet for providers: https://www.fda.gov/media/967567/download    Fact sheet for patients: https://www.fda.gov/media/510504/download    Test performed by PCR.            XR Chest 1 View    Result Date: 12/22/2024  XR CHEST 1 VW Date of Exam: 12/22/2024 4:25 AM EST Indication: DYSPNEA, CARDIAC ORIGIN SUSPECTED hypoxia and cough Comparison: None available. Findings: Patient is status post median sternotomy. Heart size is at the upper limits of normal. Pulmonary vessels are somewhat indistinct compatible pulmonary vascular congestion. Basilar interstitial markings are also prominent suggesting mild interstitial edema. No focal airspace consolidation. No pleural effusion or pneumothorax.     Impression: Impression: 1. Borderline heart size with pulmonary vascular congestion and probable mild interstitial edema. No focal airspace consolidation. Electronically Signed: Teddy Jones MD  12/22/2024 9:17 AM EST  Workstation ID: [id number]    XR Outside Films    Result Date: 12/21/2024  This procedure was auto-finalized with no dictation required.         Current medications:  Scheduled Meds:ascorbic acid, 500 mg, Oral, Daily  aspirin, 81 mg, Oral, Daily  atorvastatin, 20 mg, Oral, Nightly  azithromycin, 250 mg, Oral, Q24H  cefTRIAXone, 1,000 mg, Intravenous, Q24H  cholecalciferol, 1,000 Units, Oral, Daily  clopidogrel, 75 mg, Oral, Daily  folic acid, 400 mcg, Oral, Daily  gabapentin, 1,200 mg, Oral, Nightly  ipratropium-albuterol, 3 mL, Nebulization, 4x Daily - RT  lisinopril, 2.5 mg, Oral, Q24H  lubiprostone, 24 mcg, Oral, BID  metoprolol succinate XL, 25 mg, Oral, Daily  multivitamin and minerals, 15 mL, Oral, Daily  pantoprazole, 40 mg, Oral, BID  cyanocobalamin, 500 mcg, Oral, Daily  vitamin B-6, 100 mg, Oral, Daily      Continuous Infusions:heparin, 12 Units/kg/hr, Last Rate: 12 Units/kg/hr (12/22/24 0530)  Pharmacy to Dose  Heparin,       PRN Meds:.  acetaminophen    guaifenesin    nitroglycerin    Pharmacy to Dose Heparin    Assessment & Plan   Assessment & Plan     Active Hospital Problems    Diagnosis  POA    **Pneumonia [J18.9]  Yes    Esophageal stricture [K22.2]  Yes    Troponin level elevated [R79.89]  Yes    Chronic HFrEF [I50.22]  Yes    Coronary artery disease involving native coronary artery of native heart with angina pectoris [I25.119]  Yes    Essential hypertension [I10]  Yes    Hyperlipidemia LDL goal <70 [E78.5]  Yes    Restless legs syndrome [G25.81]  Yes      Resolved Hospital Problems   No resolved problems to display.        Brief Hospital Course to date:  Albin Andino is a 84 y.o. male with past medical history significant for hypertension, hyperlipidemia, arthritis, atrial fibrillation, coronary artery disease with recent stent placement by Dr. Cruz, [facility], esophageal stricture s/p esophageal dilatation about 2 to 3 years ago.  Patient presented to the local hospital for complaints of generalized weakness, cough, low-grade fever, falls.  Patient was transferred to [facility] for higher level of care.      Acute hypoxic respiratory failure  Influenza A  --Patient gives a history of recent fever and cough.  Chest x-ray does not show any evidence of consolidation.  However, it is suggestive of some vascular congestion.  As mentioned above patient also is positive for influenza A and that explains the respiratory symptoms.  However, patient tells me that the respiratory symptoms have improved significantly and he is coughing a lot less.  On physical exam patient did not have any cough.  Currently patient is saturating well    Elevated troponin and chest discomfort  Recent stent placement  -NSTEMI, probably type II and currently also on heparin.  Patient is currently chest pain-free.  Cardiology has seen and evaluated patient.     CAD with recent stents 9/12/24  Chronic HFrEF  Mitral  regurgitation/Aortic Stenosis  -Cardiology has seen and evaluated patient.  They recommend continuation of dual anti-platelet treatment.       HLP  -cont statin    Esophageal stricture  -Patient has had esophageal stretching about couple of years ago.  Tells me that after the procedure still has episodes of solid food getting stuck in his throat but it resolves after chasing the food with liquid.  There has been no new changes.         Expected Discharge Location and Transportation: Home  Expected Discharge in a day or 2  Expected Discharge Date: 2024; Expected Discharge Time:      VTE Prophylaxis:  Pharmacologic VTE prophylaxis orders are present.         AM-PAC 6 Clicks Score (PT): 17 (24 1051)    CODE STATUS:   Code Status and Medical Interventions: CPR (Attempt to Resuscitate); Full Support   Ordered at: 24 5007     Code Status (Patient has no pulse and is not breathing):    CPR (Attempt to Resuscitate)     Medical Interventions (Patient has pulse or is breathing):    Full Support       Jeramie Priest MD  24        Electronically signed by Jeramie Priest MD at 24 1515          Physical Therapy Notes (most recent note)        Dinora Wilkerson, PT at 24 1246  Version 1 of 1         Patient Name: Albin Andino  : 1940    MRN: [id number]                              Today's Date: 2024       Admit Date: 2024    Visit Dx: No diagnosis found.  Patient Active Problem List   Diagnosis    Osteoarthritis    Enlarged prostate    Essential hypertension    Hyperlipidemia LDL goal <70    Low back pain    Restless legs syndrome    Thrombocytopenia    Low vitamin D level    Coronary artery disease involving native coronary artery of native heart with angina pectoris    Carotid stenosis, symptomatic w/o infarct, bilateral    Pneumonia    Esophageal stricture    Troponin level elevated    Chronic HFrEF     Past Medical History:   Diagnosis Date    Arthritis     Atrial  fibrillation 06/20/2016    Dr. Mullins      Chronic HFrEF (heart failure with reduced ejection fraction)     Coronary artery disease     Elevated LFTs, with hyperbilirubinemia, due to above  08/27/2016    Enlarged prostate     Heart disease     History of transfusion     Hyperlipidemia     Hypertension     Normocytic anemia 08/28/2016     Past Surgical History:   Procedure Laterality Date    BACK SURGERY      CARDIAC CATHETERIZATION Right 9/12/2024    Procedure: Stent YAMILETH bypass graft;  Surgeon: Niels Cruz IV, MD;  Location:  [facility];  Service: Cardiovascular;  Laterality: Right;    CARDIAC SURGERY      CHOLECYSTECTOMY N/A 08/28/2016    Procedure: CHOLECYSTECTOMY LAPAROSCOPIC WITH INTRA-OPERATIVE CHOLANGIOGRAM;  Surgeon: Rogelio Sanchez MD;  Location:  [facility];  Service:     CORONARY STENT PLACEMENT      HERNIA REPAIR      KNEE SURGERY      PROSTATE SURGERY      THROAT SURGERY        General Information       Row Name 12/22/24 1057          Physical Therapy Time and Intention    Document Type evaluation  -DM     Mode of Treatment physical therapy  -DM       Row Name 12/22/24 1058          General Information    Patient Profile Reviewed yes  -DM     Prior Level of Function independent:;all household mobility;community mobility;gait;transfer;bed mobility;ADL's  indep gt w/ FWW;1 Recent fall;uses recumb bike daily  -DM     Existing Precautions/Restrictions fall;other (see comments)  influenza (droplet), NSTEMI; PNA;AF;elev trop.;Recent YAMILETH stent 9-12-24;MR/Ao.sten.; ? Covid r/o'd; recent fall;OA  -DM     Barriers to Rehab medically complex;visual deficit;hearing deficit  Wears reading glasses;[place] (needs h.aid loraine.recharge)  -DM       Row Name 12/22/24 1052          Living Environment    People in Home spouse  dtr & Grandson avail PRN  -DM       Row Name 12/22/24 1059          Home Main Entrance    Number of Stairs, Main Entrance one  -DM       Row Name 12/22/24 1058           Stairs Within Home, Primary    Stairs, Within Home, Primary 2-st;can use gr fl B/B  -DM     Number of Stairs, Within Home, Primary other (see comments)  doesn't need to access  -DM       Row Name 12/22/24 1059          Cognition    Orientation Status (Cognition) oriented to;time;disoriented to;place;verbal cues/prompts needed for orientation  stated he was in a hosp but not recalled [facility];said Dec. 23, then 22  -DM       Row Name 12/22/24 1059          Safety Issues/Impairments Affecting Functional Mobility    Safety Issues Affecting Function (Mobility) awareness of need for assistance;insight into deficits/self-awareness;safety precautions follow-through/compliance;sequencing abilities  -DM     Impairments Affecting Function (Mobility) balance;endurance/activity tolerance;postural/trunk control;shortness of breath;strength  -DM               User Key  (r) = Recorded By, (t) = Taken By, (c) = Cosigned By      Initials Name Provider Type    DM Dinora Wilkerson, PT Physical Therapist                   Mobility       Row Name 12/22/24 1059          Bed Mobility    Comment, (Bed Mobility) UIC per OT (had amb short dist. w/AD),then nsg gave gabapentin & onset drowsiness,which has resolved;Cardiol. in to assess & said cardiac echo pending;Mask issued d/t droplet prec.;instructed in PLB; lab in for blood draw; dtr left & pt's spouse arrived (coughing & stating she felt bad;issued mask to her);pt stating he needs h.aid loraine recharged when dtr ret.(unable to hear PT unless cues repeated loudly mult x's);did LAQ, marches,rocking h.to toe;noted ramu. (per dtr,d/t beta blocker);pure wick disconn for mobil.  -DM       Row Name 12/22/24 1059          Transfers    Comment, (Transfers) impulsively attempted pulling up w/ AD vs using armrests as cued for HP/seq;2 STS trials; init. flexed posture,but corrected w/ counter-pressure to sacrum to shift forw over C of G  -DM       Row Name 12/22/24 1056          Sit-Stand Transfer     Sit-Stand [place] (Transfers) verbal cues;minimum assist (75% patient effort)  -DM     Assistive Device (Sit-Stand Transfers) walker, front-wheeled  -DM       Row Name 12/22/24 1059          Gait/Stairs (Locomotion)    [place] Level (Gait) verbal cues;contact guard  PT propelled IV; 3 Laps in room, w/ 3 brief stand.rests; HR 65; desat 92%;onset min.fatigue & incr SOB  -DM     Distance in Feet (Gait) 90  -DM     Deviations/Abnormal Patterns (Gait) bilateral deviations;liz decreased;stride length decreased;base of support, wide  Decr step length; init stepped wide on 1st turn,then corrected  -DM     Bilateral Gait Deviations forward flexed posture;heel strike decreased  gazes @ floor;incr flexed posture  -DM     Comment, (Gait/Stairs) cues for incr step length, avoiding wide turns outside RAFAEL,trunk ext/focusing ahead, & PLB exer;sat. recovered to 93% upon sitting, & HR 59  -DM               User Key  (r) = Recorded By, (t) = Taken By, (c) = Cosigned By      Initials Name Provider Type    DM Dinora Wilkerson, PT Physical Therapist                   Obj/Interventions       Row Name 12/22/24 1059          Range of Motion Comprehensive    General Range of Motion lower extremity range of motion deficits identified  -DM     Comment, General Range of Motion B Hips stringer. 15-20% d/t OA  -DM       Row Name 12/22/24 1059          Strength Comprehensive (MMT)    General Manual Muscle Testing (MMT) Assessment lower extremity strength deficits identified  -DM     Comment, General Manual Muscle Testing (MMT) Assessment [place] LE grossly 3- to 4/5  -DM       Row Name 12/22/24 1059          Motor Skills    Therapeutic Exercise shoulder;hip;knee;ankle  issued HEP w/ illustrations, & instructed w/ family present, who will perform w/ pt 3x/day  -DM       Row Name 12/22/24 1059          Shoulder (Therapeutic Exercise)    Shoulder (Therapeutic Exercise) AROM (active range of motion)  -DM     Shoulder AROM (Therapeutic Exercise)  bilateral;flexion;extension;aBduction;aDduction;horizontal aBduction/aDduction;sitting;10 repetitions;other (see comments)  biceps curls; deep inspir. w/ shldr exer;min.A w/ abd.initially  -DM       Row Name 12/22/24 1059          Hip (Therapeutic Exercise)    Hip (Therapeutic Exercise) AROM (active range of motion);isometric exercises  -DM     Hip AROM (Therapeutic Exercise) bilateral;flexion;extension;aBduction;aDduction;external rotation;internal rotation;sitting;10 repetitions  -DM     Hip Isometrics (Therapeutic Exercise) bilateral;gluteal sets;sitting;10 repetitions  -DM       Row Name 12/22/24 1059          Knee (Therapeutic Exercise)    Knee (Therapeutic Exercise) AROM (active range of motion);isometric exercises  -DM     Knee AROM (Therapeutic Exercise) bilateral;flexion;extension;SAQ (short arc quad);LAQ (long arc quad);heel slides;sitting;10 repetitions  -DM     Knee Isometrics (Therapeutic Exercise) bilateral;quad sets;sitting;10 repetitions  def. HS d/t fatigue  -DM       Row Name 12/22/24 1059          Ankle (Therapeutic Exercise)    Ankle (Therapeutic Exercise) AROM (active range of motion);AAROM (active assistive range of motion)  -DM     Ankle AROM (Therapeutic Exercise) bilateral;dorsiflexion;plantarflexion;sitting;10 repetitions;2 sets  -DM     Ankle AAROM (Therapeutic Exercise) bilateral;sitting;10 repetitions;other (see comments)  AC  -DM       Row Name 12/22/24 1059          Balance    Static Sitting Balance supervision  -DM     Dynamic Sitting Balance standby assist;verbal cues  recip scooting  -DM     Position, Sitting Balance unsupported;sitting in chair  -DM     Static Standing Balance contact guard  -DM     Dynamic Standing Balance contact guard  -DM     Position/Device Used, Standing Balance walker, rolling  -DM     Balance Interventions sitting;standing;static;dynamic;weight shifting activity  -DM               User Key  (r) = Recorded By, (t) = Taken By, (c) = Cosigned By      Initials  Name Provider Type    DM Dinora Wilkerson, PT Physical Therapist                   Goals/Plan       Row Name 12/22/24 1059          Bed Mobility Goal 1 (PT)    Activity/Assistive Device (Bed Mobility Goal 1, PT) bed mobility activities, all  -DM     [place] Level/Cues Needed (Bed Mobility Goal 1, PT) supervision required  -DM     Time Frame (Bed Mobility Goal 1, PT) short term goal (STG);3 days  -DM       Row Name 12/22/24 1059          Transfer Goal 1 (PT)    Activity/Assistive Device (Transfer Goal 1, PT) sit-to-stand/stand-to-sit;bed-to-chair/chair-to-bed;walker, rolling  -DM     [place] Level/Cues Needed (Transfer Goal 1, PT) supervision required  -DM     Time Frame (Transfer Goal 1, PT) long term goal (LTG);1 week  -DM       Row Name 12/22/24 1059          Gait Training Goal 1 (PT)    Activity/Assistive Device (Gait Training Goal 1, PT) gait (walking locomotion);assistive device use;walker, rolling  Stable VS; O2 sat > 92%  -DM     [place] Level (Gait Training Goal 1, PT) contact guard required  -DM     Distance (Gait Training Goal 1, PT) 150  -DM     Time Frame (Gait Training Goal 1, PT) long term goal (LTG);1 week  -DM       Row Name 12/22/24 1059          Stairs Goal 1 (PT)    Activity/Assistive Device (Stairs Goal 1, PT) ascending stairs;descending stairs;assistive device use  -DM     [place] Level/Cues Needed (Stairs Goal 1, PT) contact guard required  -DM     Number of Stairs (Stairs Goal 1, PT) 1  -DM     Time Frame (Stairs Goal 1, PT) long term goal (LTG);1 week  -DM       Row Name 12/22/24 1059          Patient Education Goal (PT)    Activity (Patient Education Goal, PT) HEP exer  -DM     [place]/Cues/Accuracy (Memory Goal 2, PT) demonstrates adequately;verbalizes understanding  -DM     Time Frame (Patient Education Goal, PT) long term goal (LTG);1 week  -DM       Row Name 12/22/24 1059          Therapy Assessment/Plan (PT)    Planned Therapy Interventions (PT) bed mobility  training;gait training;home exercise program;postural re-education;patient/family education;stair training;strengthening;transfer training  -DM               User Key  (r) = Recorded By, (t) = Taken By, (c) = Cosigned By      Initials Name Provider Type    DM Dinora Wilkerson, PT Physical Therapist                   Clinical Impression       Row Name 12/22/24 1059          Pain    Pretreatment Pain Rating 0/10 - no pain  -DM     Posttreatment Pain Rating 0/10 - no pain  -DM       Row Name 12/22/24 1059          Plan of Care Review    Plan of Care Reviewed With patient;spouse;child;grandchild(mili)  -DM     Progress improving  -DM     Outcome Evaluation PT eval completed. Presents w/ elev trop, NSTEMI, PNA, Influenza (droplet prec.), recent fall, prev. cor.stent (YAMILETH) 9-12-24, decr LE strength/endurance & impaired funct mobil below baseline. STS x 2 trials w/ FWW & min A, amb 90 ft w/ FWW & CGA w/ 3 brief stand.rests, & performed TE per issued HEP w/ family participating. Noted min.SOB/desat 92% on RA, ramu at rest (HR 65 w/ gt), & incr.. Recommend home w/ family's assist & HHPT f/u at d/c.  -DM       Row Name 12/22/24 1059          Therapy Assessment/Plan (PT)    Patient/Family Therapy Goals Statement (PT) improved funct mobil  -DM     Rehab Potential (PT) good  -DM     Criteria for Skilled Interventions Met (PT) yes;meets criteria;skilled treatment is necessary  -DM     Therapy Frequency (PT) daily  -DM     Predicted Duration of Therapy Intervention (PT) 7 days  -DM       Row Name 12/22/24 1059          Vital Signs    Pre Systolic BP Rehab 119  -DM     Pre Treatment Diastolic BP 72  -DM     Post Systolic BP Rehab 129  -DM     Post Treatment Diastolic BP 69  -DM     Pretreatment Heart Rate (beats/min) 52  -DM     Intratreatment Heart Rate (beats/min) 65  -DM     Posttreatment Heart Rate (beats/min) 59  -DM     Pre SpO2 (%) 97  -DM     O2 Delivery Pre Treatment room air  -DM     Intra SpO2 (%) 92  -DM     O2  Delivery Intra Treatment room air  -DM     Post SpO2 (%) 93  -DM     O2 Delivery Post Treatment room air  -DM     Pre Patient Position Sitting  -DM     Intra Patient Position Standing  -DM     Post Patient Position Sitting  -DM     Rest Breaks  3  -DM       Row Name 12/22/24 1059          Positioning and Restraints    Pre-Treatment Position sitting in chair/recliner  -DM     Post Treatment Position chair  -DM     In Chair notified nsg;call light within reach;reclined;encouraged to call for assist;exit alarm on;with family/caregiver;waffle cushion;legs elevated  -DM               User Key  (r) = Recorded By, (t) = Taken By, (c) = Cosigned By      Initials Name Provider Type    Dinora Merlos, PT Physical Therapist                   Outcome Measures       Row Name 12/22/24 1059 12/22/24 1000       How much help from another person do you currently need...    Turning from your back to your side while in flat bed without using bedrails? 3  -DM 3  -AH    Moving from lying on back to sitting on the side of a flat bed without bedrails? 3  -DM 3  -AH    Moving to and from a bed to a chair (including a wheelchair)? 3  -DM 2  -AH    Standing up from a chair using your arms (e.g., wheelchair, bedside chair)? 3  -DM 3  -AH    Climbing 3-5 steps with a railing? 2  -DM 3  -AH    To walk in hospital room? 3  -DM 3  -AH    AM-PAC 6 Clicks Score (PT) 17  -DM 17  -AH    Highest Level of Mobility Goal 5 --> Static standing  -DM 5 --> Static standing  -AH      Row Name 12/22/24 1113 12/22/24 1059       Functional Assessment    Outcome Measure Options AM-PAC 6 Clicks Daily Activity (OT)  -KL AM-PAC 6 Clicks Basic Mobility (PT)  -DM              User Key  (r) = Recorded By, (t) = Taken By, (c) = Cosigned By      Initials Name Provider Type    Dinora Merlos, PT Physical Therapist    Suzy Rey, RN Registered Nurse    Briana Garza, OT Occupational Therapist                                 Physical Therapy Education        Title: PT OT SLP Therapies (In Progress)       Topic: Physical Therapy (In Progress)       Point: Mobility training (In Progress)       Learning Progress Summary            Patient Eager, E,D,H, NR by DM at 12/22/2024 1245   Family Eager, E,D,H, NR by DM at 12/22/2024 1245   Significant Other Eager, E,D,H, NR by DM at 12/22/2024 1245                      Point: Home exercise program (In Progress)       Learning Progress Summary            Patient Eager, E,D,H, NR by DM at 12/22/2024 1245   Family Eager, E,D,H, NR by DM at 12/22/2024 1245   Significant Other Eager, E,D,H, NR by DM at 12/22/2024 1245                      Point: Body mechanics (In Progress)       Learning Progress Summary            Patient Eager, E,D,H, NR by DM at 12/22/2024 1245   Family Eager, E,D,H, NR by DM at 12/22/2024 1245   Significant Other Eager, E,D,H, NR by DM at 12/22/2024 1245                      Point: Precautions (In Progress)       Learning Progress Summary            Patient Eager, E,D,H, NR by DM at 12/22/2024 1245   Family Eager, E,D,H, NR by DM at 12/22/2024 1245   Significant Other Eager, E,D,H, NR by DM at 12/22/2024 1245                                      User Key       Initials Effective Dates Name Provider Type Discipline    DM 02/03/23 -  Dinora Wilkerson, PT Physical Therapist PT                  PT Recommendation and Plan  Planned Therapy Interventions (PT): bed mobility training, gait training, home exercise program, postural re-education, patient/family education, stair training, strengthening, transfer training  Progress: improving  Outcome Evaluation: PT eval completed. Presents w/ elev trop, NSTEMI, PNA, Influenza (droplet prec.), recent fall, prev. cor.stent (YAMILETH) 9-12-24, decr LE strength/endurance & impaired funct mobil below baseline. STS x 2 trials w/ FWW & min A, amb 90 ft w/ FWW & CGA w/ 3 brief stand.rests, & performed TE per issued HEP w/ family participating. Noted min.SOB/desat 92% on RA, ramu at  rest (HR 65 w/ gt), & incr.. Recommend home w/ family's assist & HHPT f/u at d/c.     Time Calculation:   PT Evaluation Complexity  History, PT Evaluation Complexity: 3 or more personal factors and/or comorbidities  Examination of Body Systems (PT Eval Complexity): total of 3 or more elements  Clinical Presentation (PT Evaluation Complexity): evolving  Clinical Decision Making (PT Evaluation Complexity): moderate complexity  Overall Complexity (PT Evaluation Complexity): moderate complexity     PT Charges       Row Name 12/22/24 1246             Time Calculation    Start Time 1059  -DM      PT Received On 12/22/24  -DM      PT Goal Re-Cert Due Date 01/01/25  -DM         Timed Charges    79600 - PT Therapeutic Exercise Minutes 18  -DM      06329 - Gait Training Minutes  16  -DM      59030 - PT Therapeutic Activity Minutes 12  -DM         Untimed Charges    PT Eval/Re-eval Minutes 60  -DM         Total Minutes    Timed Charges Total Minutes 46  -DM      Untimed Charges Total Minutes 60  -DM       Total Minutes 106  -DM                User Key  (r) = Recorded By, (t) = Taken By, (c) = Cosigned By      Initials Name Provider Type    Dinora Merlos, PT Physical Therapist                  Therapy Charges for Today       Code Description Service Date Service Provider Modifiers Qty    58396891049 HC PT THER PROC EA 15 MIN 12/22/2024 Dinora Wilkerson, PT GP 1    03267174504 HC GAIT TRAINING EA 15 MIN 12/22/2024 Dinora Wilkerson, PT GP 1    68529827620 HC PT THERAPEUTIC ACT EA 15 MIN 12/22/2024 Dinora Wilkerson, PT GP 1    59946157614 HC PT EVAL MOD COMPLEXITY 4 12/22/2024 Dinora Wilkerson, PT GP 1            PT G-Codes  Outcome Measure Options: AM-PAC 6 Clicks Daily Activity (OT)  AM-PAC 6 Clicks Score (PT): 17  AM-PAC 6 Clicks Score (OT): 21  PT Discharge Summary  Anticipated Discharge Disposition (PT): home with assist, home with home health    Dinora Wilkerson, PT  12/22/2024      Electronically signed by  Dinora Wilkerson, PT at 24 1247          Occupational Therapy Notes (most recent note)        Briana Joe, OT at 24 3017          Patient Name: Albin Andino  : 1940    MRN: [id number]                              Today's Date: 2024       Admit Date: 2024    Visit Dx: No diagnosis found.  Patient Active Problem List   Diagnosis    Osteoarthritis    Enlarged prostate    Essential hypertension    Hyperlipidemia LDL goal <70    Low back pain    Restless legs syndrome    Thrombocytopenia    Low vitamin D level    Coronary artery disease involving native coronary artery of native heart with angina pectoris    Carotid stenosis, symptomatic w/o infarct, bilateral    Pneumonia    Esophageal stricture    Troponin level elevated    Chronic HFrEF     Past Medical History:   Diagnosis Date    Arthritis     Atrial fibrillation 2016    Dr. Mullins      Chronic HFrEF (heart failure with reduced ejection fraction)     Coronary artery disease     Elevated LFTs, with hyperbilirubinemia, due to above  2016    Enlarged prostate     Heart disease     History of transfusion     Hyperlipidemia     Hypertension     Normocytic anemia 2016     Past Surgical History:   Procedure Laterality Date    BACK SURGERY      CARDIAC CATHETERIZATION Right 2024    Procedure: Stent YAMILETH bypass graft;  Surgeon: Niels Cruz IV, MD;  Location:  [facility];  Service: Cardiovascular;  Laterality: Right;    CARDIAC SURGERY      CHOLECYSTECTOMY N/A 2016    Procedure: CHOLECYSTECTOMY LAPAROSCOPIC WITH INTRA-OPERATIVE CHOLANGIOGRAM;  Surgeon: Rogelio Sanchez MD;  Location: [facility] OR;  Service:     CORONARY STENT PLACEMENT      HERNIA REPAIR      KNEE SURGERY      PROSTATE SURGERY      THROAT SURGERY        General Information       Row Name 24 1103          OT Time and Intention    Document Type evaluation  -KL     Mode of Treatment occupational therapy  -KL      Patient Effort good  -     Symptoms Noted During/After Treatment fatigue  -       Row Name 12/22/24 1103          General Information    Patient Profile Reviewed yes  -     Prior Level of Function independent:;all household mobility;community mobility;gait;transfer;bed mobility;ADL's  FWW; 1 recent fall  -     Existing Precautions/Restrictions fall  [place]; flu +  -     Barriers to Rehab none identified  -       Row Name 12/22/24 1103          Living Environment    People in Home spouse  -       Row Name 12/22/24 1103          Home Main Entrance    Number of Stairs, Main Entrance one  -       Row Name 12/22/24 1103          Stairs Within Home, Primary    Stairs, Within Home, Primary Can reside on the main level  -       Row Name 12/22/24 1103          Cognition    Orientation Status (Cognition) oriented x 3  -       Row Name 12/22/24 1103          Safety Issues/Impairments Affecting Functional Mobility    Safety Issues Affecting Function (Mobility) insight into deficits/self-awareness;safety precaution awareness;safety precautions follow-through/compliance;awareness of need for assistance  -     Impairments Affecting Function (Mobility) balance;endurance/activity tolerance;shortness of breath;strength  -               User Key  (r) = Recorded By, (t) = Taken By, (c) = Cosigned By      Initials Name Provider Type     Briana Joe OT Occupational Therapist                     Mobility/ADL's       Row Name 12/22/24 1105          Bed Mobility    Bed Mobility supine-sit  -     Supine-Sit [place] (Bed Mobility) supervision  -     Assistive Device (Bed Mobility) head of bed elevated  -       Row Name 12/22/24 1105          Transfers    Transfers sit-stand transfer;bed-chair transfer  -       Row Name 12/22/24 1105          Bed-Chair Transfer    Bed-Chair [place] (Transfers) contact guard;1 person assist;verbal cues  -     Assistive Device (Bed-Chair Transfers) walker,  front-wheeled  -KL       Row Name 12/22/24 1105          Sit-Stand Transfer    Sit-Stand [place] (Transfers) contact guard;1 person assist  -     Assistive Device (Sit-Stand Transfers) walker, front-wheeled  -KL       Row Name 12/22/24 1105          Functional Mobility    Functional Mobility- Ind. Level contact guard assist;1 person;verbal cues required  -     Functional Mobility- Device walker, front-wheeled  [facility]     Functional Mobility-Distance (Feet) --  < HH distances  -KL       Row Name 12/22/24 1105          Activities of Daily Living    BADL Assessment/Intervention lower body dressing  -KL       Row Name 12/22/24 1105          Lower Body Dressing Assessment/Training    [place] Level (Lower Body Dressing) supervision  -     Position (Lower Body Dressing) edge of bed sitting  -     Comment, (Lower Body Dressing) Pt able to demo figure 4 to adjust B footwear  -               User Key  (r) = Recorded By, (t) = Taken By, (c) = Cosigned By      Initials Name Provider Type    Briana Garza OT Occupational Therapist                   Obj/Interventions       Row Name 12/22/24 1109          Sensory Assessment (Somatosensory)    Sensory Assessment (Somatosensory) UE sensation intact  -KL       Row Name 12/22/24 1109          Range of Motion Comprehensive    General Range of Motion bilateral upper extremity ROM WFL  -KL       Row Name 12/22/24 1109          Strength Comprehensive (MMT)    Comment, General Manual Muscle Testing (MMT) Assessment BUE MMT grossly 4/5  -KL       Row Name 12/22/24 1109          Balance    Balance Assessment sitting static balance;sitting dynamic balance;sit to stand dynamic balance;standing static balance;standing dynamic balance  -     Static Sitting Balance supervision  -     Dynamic Sitting Balance contact guard  -     Position, Sitting Balance sitting edge of bed  -     Static Standing Balance contact guard  -     Dynamic Standing Balance contact guard  -      Position/Device Used, Standing Balance supported  -     Balance Interventions sitting;standing;sit to stand;supported;static;dynamic;occupation based/functional task  -               User Key  (r) = Recorded By, (t) = Taken By, (c) = Cosigned By      Initials Name Provider Type    Briana Garza OT Occupational Therapist                   Goals/Plan       Row Name 12/22/24 1112          Transfer Goal 1 (OT)    Activity/Assistive Device (Transfer Goal 1, OT) sit-to-stand/stand-to-sit;toilet  -     [place] Level/Cues Needed (Transfer Goal 1, OT) supervision required  -     Time Frame (Transfer Goal 1, OT) short term goal (STG);3 days  -KL     Progress/Outcome (Transfer Goal 1, OT) new goal  -       Row Name 12/22/24 1112          Toileting Goal 1 (OT)    Activity/Device (Toileting Goal 1, OT) adjust/manage clothing;perform perineal hygiene  -     [place] Level/Cues Needed (Toileting Goal 1, OT) modified independence  -KL     Time Frame (Toileting Goal 1, OT) long term goal (LTG);5 days  -KL     Progress/Outcome (Toileting Goal 1, OT) new goal  -       Row Name 12/22/24 1112          Grooming Goal 1 (OT)    Activity/Device (Grooming Goal 1, OT) hair care;oral care;wash face, hands  -KL     [place] (Grooming Goal 1, OT) modified independence  -KL     Time Frame (Grooming Goal 1, OT) long term goal (LTG);5 days  -KL     Strategies/Barriers (Grooming Goal 1, OT) sink side  -KL     Progress/Outcome (Grooming Goal 1, OT) new goal  -       Row Name 12/22/24 1112          Therapy Assessment/Plan (OT)    Planned Therapy Interventions (OT) activity tolerance training;functional balance retraining;occupation/activity based interventions;patient/caregiver education/training;ROM/therapeutic exercise;strengthening exercise;transfer/mobility retraining  -               User Key  (r) = Recorded By, (t) = Taken By, (c) = Cosigned By      Initials Name Provider Type    Briana Garza, OT  Occupational Therapist                   Clinical Impression       Row Name 12/22/24 1110          Pain Assessment    Pretreatment Pain Rating 0/10 - no pain  -     Posttreatment Pain Rating 0/10 - no pain  -       Row Name 12/22/24 1110          Plan of Care Review    Plan of Care Reviewed With patient;daughter  -     Progress no change  -     Outcome Evaluation Pt presents to OT evaluation w/ minor deficits in functional activity tolerance, generalized weakness and self-care. Pt would benefit from IPOT services to address deficits in order to progress towards goals. d/c rec is home w/ assist and HH.  -       Row Name 12/22/24 1110          Therapy Assessment/Plan (OT)    Patient/Family Therapy Goal Statement (OT) Return to OF  -     Rehab Potential (OT) good  -     Criteria for Skilled Therapeutic Interventions Met (OT) yes  -     Therapy Frequency (OT) daily  -     Predicted Duration of Therapy Intervention (OT) 5 days  -       Row Name 12/22/24 1110          Therapy Plan Review/Discharge Plan (OT)    Anticipated Discharge Disposition (OT) home with assist;home with home health  -       Row Name 12/22/24 1110          Vital Signs    Pretreatment Heart Rate (beats/min) 66  -KL     Posttreatment Heart Rate (beats/min) 67  -     Pre SpO2 (%) 95  -KL     O2 Delivery Pre Treatment nasal cannula  -     Intra SpO2 (%) 96  -KL     O2 Delivery Intra Treatment room air  -     Post SpO2 (%) 96  -     O2 Delivery Post Treatment room air  -     Pre Patient Position Supine  -     Intra Patient Position Standing  -     Post Patient Position Sitting  -       Row Name 12/22/24 1110          Positioning and Restraints    Pre-Treatment Position in bed  -     Post Treatment Position chair  -     In Chair notified nsg;reclined;sitting;call light within reach;encouraged to call for assist;exit alarm on;with family/caregiver;waffle cushion;on mechanical lift sling  -               User Key   (r) = Recorded By, (t) = Taken By, (c) = Cosigned By      Initials Name Provider Type    Briana Garza OT Occupational Therapist                   Outcome Measures       Row Name 12/22/24 1113          How much help from another is currently needed...    Putting on and taking off regular lower body clothing? 3  -KL     Bathing (including washing, rinsing, and drying) 3  -KL     Toileting (which includes using toilet bed pan or urinal) 3  -KL     Putting on and taking off regular upper body clothing 4  -KL     Taking care of personal grooming (such as brushing teeth) 4  -KL     Eating meals 4  -KL     AM-PAC 6 Clicks Score (OT) 21  -KL       Row Name 12/22/24 1113          Functional Assessment    Outcome Measure Options AM-PAC 6 Clicks Daily Activity (OT)  -               User Key  (r) = Recorded By, (t) = Taken By, (c) = Cosigned By      Initials Name Provider Type    Briana Garza OT Occupational Therapist                    Occupational Therapy Education       Title: PT OT SLP Therapies (In Progress)       Topic: Occupational Therapy (In Progress)       Point: ADL training (In Progress)       Description:   Instruct learner(s) on proper safety adaptation and remediation techniques during self care or transfers.   Instruct in proper use of assistive devices.                  Learning Progress Summary            Patient Acceptance, E, NR by RACHAEL at 12/22/2024 1113   Family Acceptance, E, NR by  at 12/22/2024 1113                      Point: Home exercise program (Not Started)       Description:   Instruct learner(s) on appropriate technique for monitoring, assisting and/or progressing therapeutic exercises/activities.                  Learner Progress:  Not documented in this visit.              Point: Precautions (In Progress)       Description:   Instruct learner(s) on prescribed precautions during self-care and functional transfers.                  Learning Progress Summary            Patient Acceptance,  E, NR by  at 12/22/2024 1113   Family Acceptance, E, NR by  at 12/22/2024 1113                      Point: Body mechanics (In Progress)       Description:   Instruct learner(s) on proper positioning and spine alignment during self-care, functional mobility activities and/or exercises.                  Learning Progress Summary            Patient Acceptance, E, NR by  at 12/22/2024 1113   Family Acceptance, E, NR by  at 12/22/2024 1113                                      User Key       Initials Effective Dates Name Provider Type Discipline     02/05/24 -  Briana Joe OT Occupational Therapist OT                  OT Recommendation and Plan  Planned Therapy Interventions (OT): activity tolerance training, functional balance retraining, occupation/activity based interventions, patient/caregiver education/training, ROM/therapeutic exercise, strengthening exercise, transfer/mobility retraining  Therapy Frequency (OT): daily  Plan of Care Review  Plan of Care Reviewed With: patient, daughter  Progress: no change  Outcome Evaluation: Pt presents to OT evaluation w/ minor deficits in functional activity tolerance, generalized weakness and self-care. Pt would benefit from IPOT services to address deficits in order to progress towards goals. d/c rec is home w/ assist and HH.     Time Calculation:   Evaluation Complexity (OT)  Review Occupational Profile/Medical/Therapy History Complexity: brief/low complexity  Assessment, Occupational Performance/Identification of Deficit Complexity: 1-3 performance deficits  Clinical Decision Making Complexity (OT): problem focused assessment/low complexity  Overall Complexity of Evaluation (OT): low complexity     Time Calculation- OT       Row Name 12/22/24 1113             Time Calculation- OT    OT Start Time 0845  -      OT Received On 12/22/24  -      OT Goal Re-Cert Due Date 01/01/25  -         Timed Charges    63545 - OT Therapeutic Activity Minutes 10  -          Untimed Charges    OT Eval/Re-eval Minutes 47  -KL         Total Minutes    Timed Charges Total Minutes 10  -KL      Untimed Charges Total Minutes 47  -KL       Total Minutes 57  -KL                User Key  (r) = Recorded By, (t) = Taken By, (c) = Cosigned By      Initials Name Provider Type    Briana Garza OT Occupational Therapist                  Therapy Charges for Today       Code Description Service Date Service Provider Modifiers Qty    [id number]  OT THERAPEUTIC ACT EA 15 MIN 12/22/2024 Briana Joe OT GO 1    [id number]  OT EVAL LOW COMPLEXITY 4 12/22/2024 Briana Joe OT GO 1                 Briana Joe OT  12/22/2024    Electronically signed by Briana Joe OT at 12/22/24 1114

## 2024-12-23 NOTE — CASE MANAGEMENT/SOCIAL WORK
Case Management Discharge Note      Final Note: Plan is home with [facility]. Family will transport. [facility] accepted. No other discharge needs identified.    Provided Post Acute Provider List?: Yes  Post Acute Provider List: Home Health  Provided Post Acute Provider Quality & Resource List?: Yes  Post Acute Provider Quality and Resource List: Home Health  Delivered To: Patient, Support Person  Method of Delivery: In person    Selected Continued Care - Admitted Since 12/21/2024       Destination    No services have been selected for the patient.                Durable Medical Equipment    No services have been selected for the patient.                Dialysis/Infusion    No services have been selected for the patient.                Home Medical Care Coordination complete.      Service Provider Services Address Phone Fax Patient Preferred    [facility] Home Nursing, Home Rehabilitation, Home Health Services [address] [place] [phone] --              Therapy    No services have been selected for the patient.                Community Resources    No services have been selected for the patient.                [facility]    No services have been selected for the patient.                         Final Discharge Disposition Code: 06 - home with home health care

## 2024-12-23 NOTE — CASE MANAGEMENT/SOCIAL WORK
Discharge Planning Assessment  [facility]     Patient Name: Albin Andino  MRN: [id number]  Today's Date: 12/23/2024    Admit Date: 12/21/2024    Plan: Home with [facility]    Discharge Needs Assessment       Row Name 12/23/24 0829       Living Environment    People in Home spouse    Name(s) of People in Home Loraine (spouse) [phone]    Current Living Arrangements home    Potentially Unsafe Housing Conditions none    In the past 12 months has the electric, gas, oil, or water company threatened to shut off services in your home? No    Primary Care Provided by self    Provides Primary Care For no one    Family Caregiver if Needed spouse    Quality of Family Relationships helpful;involved;supportive    Able to Return to Prior Arrangements yes       Resource/Environmental Concerns    Resource/Environmental Concerns none    Transportation Concerns none       Transportation Needs    In the past 12 months, has lack of transportation kept you from medical appointments or from getting medications? no    In the past 12 months, has lack of transportation kept you from meetings, work, or from getting things needed for daily living? No       Food Insecurity    Within the past 12 months, you worried that your food would run out before you got the money to buy more. Never true    Within the past 12 months, the food you bought just didn't last and you didn't have money to get more. Never true       Transition Planning    Patient/Family Anticipates Transition to home with help/services    Patient/Family Anticipated Services at Transition none    Transportation Anticipated family or friend will provide       Discharge Needs Assessment    Readmission Within the Last 30 Days no previous admission in last 30 days    Equipment Currently Used at Home walker, rolling    Concerns to be Addressed denies needs/concerns at this time    Do you want help finding or keeping work or a job? I do not need or want help    Do you  want help with school or training? For example, starting or completing job training or getting a high school diploma, GED or equivalent No    Anticipated Changes Related to Illness none    Equipment Needed After Discharge none    Outpatient/Agency/Support Group Needs homecare agency    Discharge Facility/Level of Care Needs home with home health    Provided Post Acute Provider List? Yes    Post Acute Provider List Home Health    Provided Post Acute Provider Quality & Resource List? Yes    Post Acute Provider Quality and Resource List Home Health    Delivered To Patient;Support Person    Method of Delivery In person    Patient's Choice of Community Agency(s) [facility]                   Discharge Plan       Row Name 12/23/24 0832       Plan    Plan Home with [facility]     Patient/Family in Agreement with Plan yes    Plan Comments Spoke with patient  at bedside. Lives with Loraine (spouse) [phone] in [place]. Is independent with ADL's. No problems affording [facility] Medicare or medications. Uses [facility] pharmacy. Uses a rolling walker. PCP is ABDIAZIZ Razo. [facility] Family will transport. Will need a referral to  at discharge. CM will continue to follow.    Final Discharge Disposition Code 06 - home with home health care                  Continued Care and Services - Admitted Since 12/21/2024    No active coordination exists for this encounter.       Expected Discharge Date and Time       Expected Discharge Date Expected Discharge Time    Dec 24, 2024            Demographic Summary       Row Name 12/23/24 0829       General Information    Admission Type inpatient    Arrived From emergency department    Referral Source admission list    Reason for Consult discharge planning    Preferred Language English       Contact Information    Permission Granted to Share Info With     Contact Information Obtained for                     Functional Status       Row Name 12/23/24 0829       Functional Status    Usual Activity Tolerance good    Current Activity Tolerance good       Physical Activity    On average, how many days per week do you engage in moderate to strenuous exercise (like a brisk walk)? 0 days    On average, how many minutes do you engage in exercise at this level? 0 min    Number of minutes of exercise per week 0       Functional Status, IADL    Medications independent    Meal Preparation independent    Housekeeping independent    Laundry independent    Shopping independent       Mental Status    General Appearance WDL WDL       Mental Status Summary    Recent Changes in Mental Status/Cognitive Functioning no changes       Employment/    Employment Status retired                   Psychosocial    No documentation.                  Abuse/Neglect    No documentation.                  Legal    No documentation.                  Substance Abuse    No documentation.                  Patient Forms    No documentation.                     Yoan Obrien RN

## 2024-12-23 NOTE — CASE MANAGEMENT/SOCIAL WORK
Continued Stay Note  [facility]     Patient Name: Albin Andino  MRN: [id number]  Today's Date: 12/23/2024    Admit Date: 12/21/2024    Plan: home with [facility] SN/PT/OT   Discharge Plan       Row Name 12/23/24 1130       Plan    Plan home with [facility] SN/PT/OT    Plan Comments [facility].  [facility]. CM will continue to follow.    Final Discharge Disposition Code 06 - home with home health care                   Discharge Codes    No documentation.                 Expected Discharge Date and Time       Expected Discharge Date Expected Discharge Time    Dec 24, 2024               Yoan Obrien RN

## 2024-12-23 NOTE — PROGRESS NOTES
Cardiology Progress Note      Reason for visit:    Chest pain  Chronic systolic heart failure    IDENTIFICATION: 84-year-old gentleman who resides in [place], [place]    Active Hospital Problems    Diagnosis  POA    **Pneumonia [J18.9]  Yes     Priority: High    Type II NSTEMI from influenza [I21.4]  Yes     Priority: High    Chronic HFrEF [I50.22]  Yes     Priority: High    Coronary artery disease involving native coronary artery of native heart with angina pectoris [I25.119]  Yes     Priority: Medium     CABG in the 1980s  PCI 2006 - 2007  Cardiac catheterization (9/6/2024): Occluded native circulation.  Patent LIMA to LAD.  Severely degenerated SVG to RCA and SVG to OM  Cardiac catheterization (9/16/2024): Successful high risk PCI of degenerated sequential SVG to RPDA/RPL--proximal and distal segments--using large-caliber drug-eluting stents.  Residual stenosis of vein graft supplying RPL not treated. Successful high risk PCI of degenerated SVG to OM with large caliber YAMILETH      Essential hypertension [I10]  Yes     Priority: Medium     Target blood pressure <130/80 mmHg      Esophageal stricture [K22.2]  Yes     Priority: Low    Hyperlipidemia LDL goal <70 [E78.5]  Yes     Priority: Low    Restless legs syndrome [G25.81]  Yes     Priority: Low            Patient is sitting up in the bed breathing easy on room air.  The patient did have an episode of chest discomfort yesterday and EKG performed was unchanged from prior EKG and no acute ischemic changes.  He has not had any further chest pain..  His complaints on admission were dizziness and weakness.  IV heparin drip is infusing.           Vital Sign Min/Max for last 24 hours  Temp  Min: 97.6 °F (36.4 °C)  Max: 98 °F (36.7 °C)   BP  Min: 118/68  Max: 132/69   Pulse  Min: 49  Max: 72   Resp  Min: 18  Max: 18   SpO2  Min: 79 %  Max: 99 %   Flow (L/min) (Oxygen Therapy)  Min: 2  Max: 2      Intake/Output Summary (Last 24 hours) at 12/23/2024 0802  Last data  filed at 12/23/2024 0253  Gross per 24 hour   Intake --   Output 375 ml   Net -375 ml           Physical Exam  Constitutional:       General: He is awake.   Cardiovascular:      Rate and Rhythm: Normal rate and regular rhythm.      Heart sounds: No murmur heard.  Pulmonary:      Effort: Pulmonary effort is normal.      Breath sounds: Wheezing and rhonchi present.   Musculoskeletal:      Right lower leg: No edema.      Left lower leg: No edema.   Skin:     General: Skin is warm and dry.   Neurological:      Mental Status: He is alert.   Psychiatric:         Behavior: Behavior is cooperative.         Tele: Normal sinus rhythm    Results Review (reviewed the patient's recent labs in the electronic medical record):     EKG (12/21/2024):     CT of the chest (12/22/2024): Nonspecific interstitial opacities present bilaterally.  Suspect underlying interstitial lung disease and/or mild interstitial edema.  Nonspecific groundglass opacities within the left upper and lower lobes.  Atypical infiltrates cannot be excluded.  Mild bilateral lower lobe bronchiectasis.    ECHO (12/22/2024): LVEF 51-55%.  Grade 2 diastolic dysfunction.  No significant valvular abnormality    Results from last 7 days   Lab Units 12/21/24  2329   SODIUM mmol/L 140   POTASSIUM mmol/L 4.4   CHLORIDE mmol/L 101   BUN mg/dL 12   CREATININE mg/dL 0.74*     Results from last 7 days   Lab Units 12/22/24  0531 12/22/24  0234 12/21/24  2329   HSTROP T ng/L 175* 219* 293*     Results from last 7 days   Lab Units 12/22/24  2322 12/21/24  2329   WBC 10*3/mm3 4.30 3.99   HEMOGLOBIN g/dL 13.1 13.9   HEMATOCRIT % 38.6 41.3   PLATELETS 10*3/mm3 115* 98*       Lab Results   Component Value Date    HGBA1C 4.50 08/28/2016       Lab Results   Component Value Date    CHOL 96 08/28/2016    CHLPL 138 06/15/2020    TRIG 121 06/15/2020    HDL 41 06/15/2020    LDL 73 06/15/2020              Type II NSTEMI from influenza and pneumonia  Troponins elevated but flat and trending  down at 293, 219 and 175.  EKG no acute ischemic changes  Positive for influenza A  Troponins trending down  Defer any ischemic evaluation as scenario more consistent with type II NSTEMI from influenza and pneumonia  IV heparin drip infusing    Coronary artery disease  Status post CABG in the 1980s with multiple PCI's.  Recent high risk PCI of sequential SVG to RPDA/RPL using drug-eluting stents..  High risk PCI of degenerated SVG to OM 9/2024  DAPT for recent PCI with aspirin and Plavix      Chronic systolic heart failure  Echo this admission shows normal LVEF and grade 2 diastolic dysfunction  Guideline directed medical therapy with metoprolol succinate, lisinopril  20 mg IV Lasix daily      Hypertension  Current /69    Hyperlipidemia  Lipitor 20 mg daily           Scenario more consistent with type II NSTEMI from influenza A and pneumonia.  Echocardiogram showed normal LVEF and no wall abnormality.  Discontinue IV heparin and discontinue IV Lasix  Continue DAPT, statin, lisinopril and metoprolol succinate  From cardiac standpoint he is okay for discharge home.  He can follow-up in the office in 4 weeks for reassessment once he has recovered from pneumonia and influenza.  Please call cardiology with any further questions    Electronically signed by ABDIAZIZ Cruz, 12/23/24, 8:02 AM EST.

## 2024-12-23 NOTE — THERAPY EVALUATION
Acute Care - Speech Language Pathology   Swallow Initial Evaluation [facility]  Clinical Swallow Evaluation       Patient Name: Albin Andino  : 1940  MRN: [id number]  Today's Date: 2024               Admit Date: 2024    Visit Dx:   No diagnosis found.  Patient Active Problem List   Diagnosis    Osteoarthritis    Enlarged prostate    Essential hypertension    Hyperlipidemia LDL goal <70    Low back pain    Restless legs syndrome    Thrombocytopenia    Low vitamin D level    Coronary artery disease involving native coronary artery of native heart with angina pectoris    Carotid stenosis, symptomatic w/o infarct, bilateral    Pneumonia    Esophageal stricture    Type II NSTEMI from influenza    Chronic HFrEF     Past Medical History:   Diagnosis Date    Arthritis     Atrial fibrillation 2016    Dr. Mullins      Chronic HFrEF (heart failure with reduced ejection fraction)     Coronary artery disease     Elevated LFTs, with hyperbilirubinemia, due to above  2016    Enlarged prostate     Heart disease     History of transfusion     Hyperlipidemia     Hypertension     Normocytic anemia 2016     Past Surgical History:   Procedure Laterality Date    BACK SURGERY      CARDIAC CATHETERIZATION Right 2024    Procedure: Stent YAMILETH bypass graft;  Surgeon: Niels Cruz IV, MD;  Location: [facility] CATH INVASIVE LOCATION;  Service: Cardiovascular;  Laterality: Right;    CARDIAC SURGERY      CHOLECYSTECTOMY N/A 2016    Procedure: CHOLECYSTECTOMY LAPAROSCOPIC WITH INTRA-OPERATIVE CHOLANGIOGRAM;  Surgeon: Rogelio Sanchez MD;  Location: [facility] OR;  Service:     CORONARY STENT PLACEMENT      HERNIA REPAIR      KNEE SURGERY      PROSTATE SURGERY      THROAT SURGERY         SLP Recommendation and Plan  SLP Swallowing Diagnosis: functional oral phase, functional pharyngeal phase, suspected esophageal dysphagia, other (see comments) (secondary to history of esophageal  stricture) (12/23/24 0917)  SLP Diet Recommendation: soft to chew textures, whole, thin liquids (12/23/24 0917)  Recommended Precautions and Strategies: upright posture during/after eating, reflux precautions (12/23/24 0917)  SLP Rec. for Method of Medication Administration: meds whole, with thin liquids, with puree, as tolerated (12/23/24 0917)     Monitor for Signs of Aspiration: notify SLP if any concerns (12/23/24 0917)     Swallow Criteria for Skilled Therapeutic Interventions Met: no problems identified which require skilled intervention (12/23/24 0917)  Anticipated Discharge Disposition (SLP): home (12/23/24 0917)     Therapy Frequency (Swallow): evaluation only (12/23/24 0917)     Oral Care Recommendations: Oral Care BID/PRN, Toothbrush (12/23/24 0917)  Demonstrates Need for Referral to Another Service: gastroenterology (12/23/24 0917)                                     Progress:  (eval)      SWALLOW EVALUATION (Last 72 Hours)       SLP Adult Swallow Evaluation       Row Name 12/23/24 0917                   Rehab Evaluation    Document Type evaluation  -MM        Subjective Information no complaints  -MM        Patient Observations alert;cooperative;agree to therapy  -MM        Patient/Family/Caregiver Comments/Observations Pt's daughter present  -MM        Patient Effort good  -MM        Symptoms Noted During/After Treatment none  -MM           General Information    Patient Profile Reviewed yes  -MM        Pertinent History Of Current Problem Pt is an 84 year old male who presented to the facility with c/o weakness and dx pneumonia. Pt with history of HTN, HLP, RLS, esophageal stricture s/p dilation, and complicated CAD.  -MM        Current Method of Nutrition mechanical ground textures;thin liquids  -MM        Precautions/Limitations, Vision WFL;for purposes of eval  -MM        Precautions/Limitations, Hearing WFL;for purposes of eval  -MM        Prior Level of Function-Communication WFL  -MM         Prior Level of Function-Swallowing no diet consistency restrictions;esophageal concerns  -MM        Plans/Goals Discussed with patient and family;agreed upon  -MM        Patient's Goals for Discharge return home  -MM           Pain    Pretreatment Pain Rating 0/10 - no pain  -MM        Posttreatment Pain Rating 0/10 - no pain  -MM           Oral Motor Structure and Function    Dentition Assessment natural, present and adequate  -MM        Secretion Management WNL/WFL  -MM        Mucosal Quality moist, healthy  -MM           Oral Musculature and Cranial Nerve Assessment    Oral Motor General Assessment WFL  -MM           Clinical Swallow Eval    Oral Prep Phase WFL  -MM        Oral Transit WFL  -MM        Oral Residue WFL  -MM        Pharyngeal Phase no overt signs/symptoms of pharyngeal impairment  -MM        Esophageal Phase suspected esophageal impairment  -MM        Clinical Swallow Evaluation Summary Patient presents with grossly functional oropharyngeal deglutition with all consistencies trialed. Pt reports history of esophageal stricture and a dilation ~1-2 years ago which helped with globus sensation, however, the symptoms have returned in the last few months. Pt had no complaints throughout evaluation. SLP recs soft to chew whole meat diet with thin liquids at this time and consult with GI when he is cleared by cardiology for repeat dilation. All questions answered.  -MM           SLP Evaluation Clinical Impression    SLP Swallowing Diagnosis functional oral phase;functional pharyngeal phase;suspected esophageal dysphagia;other (see comments)  secondary to history of esophageal stricture  -MM        Functional Impact no impact on function  -MM        Swallow Criteria for Skilled Therapeutic Interventions Met no problems identified which require skilled intervention  -MM           Recommendations    Therapy Frequency (Swallow) evaluation only  -MM        SLP Diet Recommendation soft to chew  textures;whole;thin liquids  -MM        Recommended Precautions and Strategies upright posture during/after eating;reflux precautions  -MM        Oral Care Recommendations Oral Care BID/PRN;Toothbrush  -MM        SLP Rec. for Method of Medication Administration meds whole;with thin liquids;with puree;as tolerated  -MM        Monitor for Signs of Aspiration notify SLP if any concerns  -MM        Anticipated Discharge Disposition (SLP) home  -MM        Demonstrates Need for Referral to Another Service gastroenterology  -MM                  User Key  (r) = Recorded By, (t) = Taken By, (c) = Cosigned By      Initials Name Effective Dates    Tammy Motta MS CCC-SLP 08/30/24 -                     EDUCATION  The patient has been educated in the following areas:   Oral Care/Hydration.                Time Calculation:    Time Calculation- SLP       Row Name 12/23/24 1057             Time Calculation- SLP    SLP Start Time 0917  -MM      SLP Received On 12/23/24  -MM         Untimed Charges    53545-MI Eval Oral Pharyng Swallow Minutes 54  -MM         Total Minutes    Untimed Charges Total Minutes 54  -MM       Total Minutes 54  -MM                User Key  (r) = Recorded By, (t) = Taken By, (c) = Cosigned By      Initials Name Provider Type    Tammy Motta MS CCC-SLP Speech and Language Pathologist                    Therapy Charges for Today       Code Description Service Date Service Provider Modifiers Qty    [id number] HC ST EVAL ORAL PHARYNG SWALLOW 4 12/23/2024 Tammy Burris MS CCC-SLP GN 1                 Tammy Burris MS CCC-RAYMOND  12/23/2024

## 2024-12-24 LAB
BACTERIA SPEC RESP CULT: NORMAL
GRAM STN SPEC: NORMAL

## 2024-12-24 NOTE — OUTREACH NOTE
Prep Survey      Flowsheet Row Responses   [facility] facility patient discharged from? [place]   Is LACE score < 7 ? No   Eligibility Readm Mgmt   Discharge diagnosis Pneumonia   Does the patient have one of the following disease processes/diagnoses(primary or secondary)? Pneumonia   Does the patient have Home health ordered? Yes   What is the Home health agency?  [facility]   Is there a DME ordered? No   Prep survey completed? Yes            Monica MARQUEZ - Registered Nurse

## 2024-12-30 NOTE — DISCHARGE SUMMARY
[facility] Medicine Services  DISCHARGE SUMMARY    Patient Name: Albin Andino  : 1940  MRN: [id number]    Date of Admission: 2024  9:58 PM  Date of Discharge:  24  Primary Care Physician: Ariela Brannon DO    Consults       Date and Time Order Name Status Description    2024  8:11 AM Inpatient Cardiology Consult Completed             Hospital Course     Presenting Problem: Weakness    Active Hospital Problems    Diagnosis  POA    **Pneumonia [J18.9]  Yes    Esophageal stricture [K22.2]  Yes    Type II NSTEMI from influenza [I21.4]  Yes    Heart failure with preserved left ventricular function (HFpEF) [I50.30]  Yes    Coronary artery disease involving native coronary artery of native heart with angina pectoris [I25.119]  Yes    Essential hypertension [I10]  Yes    Hyperlipidemia LDL goal <70 [E78.5]  Yes    Restless legs syndrome [G25.81]  Yes      Resolved Hospital Problems   No resolved problems to display.          Hospital Course:  Albin Andino is a 84 y.o. male with past medical history significant for hypertension, hyperlipidemia, arthritis, atrial fibrillation, coronary artery disease with recent stent placement by Dr. Cruz, BPH, esophageal stricture s/p esophageal dilatation about 2 to 3 years ago.  Patient presented to the local hospital for complaints of generalized weakness, cough, low-grade fever, falls.  Patient was transferred to [facility] for higher level of care.        Acute hypoxic respiratory failure  Influenza A  --Patient gives a history of recent fever and cough.  Chest x-ray does not show any evidence of consolidation.  However, it is suggestive of some vascular congestion.  As mentioned above patient also is positive for influenza A and that explains the respiratory symptoms.  However, patient tells me that the respiratory symptoms have improved significantly and he is coughing a lot less.  On physical  exam patient did not have any cough.  Currently patient is saturating well on room air     Elevated troponin and chest discomfort  Recent stent placement  -NSTEMI, probably type II and currently also on heparin.  Patient is currently chest pain-free.  Cardiology has seen and evaluated patient.  The discontinued heparin drip and they are okay with patient being discharged and follow-up with cardiology as per schedule.     CAD with recent stents 9/12/24  Chronic HFrEF  Mitral regurgitation/Aortic Stenosis  -Cardiology has seen and evaluated patient.  They recommend continuation of dual anti-platelet treatment.        HLP  -cont statin     Esophageal stricture  -Patient has had esophageal stretching about couple of years ago.  Tells me that after the procedure still has episodes of solid food getting stuck in his throat but it resolves after chasing the food with liquid.  There has been no new changes.         Discharge Follow Up Recommendations for outpatient labs/diagnostics:       Day of Discharge     HPI:   Resting in a chair in no acute distress and family is also present at the bedside.  Tells me that he feels okay.  Does not have any specific complaint.  Tells me that he has improved significantly since admission and is very eager to go home.  No fever or chills.  No chest pain palpitation shortness of breath at rest.  No nausea vomiting or diarrhea.    Review of Systems  As above    Vital Signs:          Physical Exam:  Constitutional: No acute distress, looks comfortable  HENT: NCAT, mucous membranes moist  Respiratory: Clear to auscultation bilaterally, respiratory effort normal   Cardiovascular: RRR, no murmurs, rubs, or gallops  Gastrointestinal: Positive bowel sounds, soft, nontender, nondistended  Musculoskeletal: No bilateral ankle edema  Psychiatric: Appropriate affect, cooperative  Neurologic: Oriented x 3,  speech clear  Skin: No rashes     Pertinent  and/or Most Recent Results     LAB RESULTS:                               Brief Urine Lab Results       None          Microbiology Results (last 10 days)       Procedure Component Value - Date/Time    Respiratory Culture - Sputum, Cough [[id number]] Collected: 12/22/24 1431    Lab Status: Final result Specimen: Sputum from Cough Updated: 12/24/24 0944     Respiratory Culture Light growth (2+) Normal respiratory arianna. No S. aureus or Pseudomonas aeruginosa detected. Final report.     Gram Stain Moderate (3+) WBCs per low power field      Rare (1+) Epithelial cells per low power field      Rare (1+) Mixed bacterial morphotypes seen on Gram Stain    COVID-19, FLU A/B, RSV PCR 1 HR TAT - Swab, Nasopharynx [[id number]]  (Abnormal) Collected: 12/22/24 0716    Lab Status: Final result Specimen: Swab from Nasopharynx Updated: 12/22/24 0848     COVID19 Not Detected     Influenza A PCR Detected     Influenza B PCR Not Detected     RSV, PCR Not Detected    Narrative:      Fact sheet for providers: https://www.fda.gov/media/023827/download    Fact sheet for patients: https://www.fda.gov/media/875085/download    Test performed by PCR.    S. Pneumo Ag Urine or CSF - Urine, Urine, Clean Catch [[id number]]  (Normal) Collected: 12/22/24 0025    Lab Status: Final result Specimen: Urine, Clean Catch Updated: 12/22/24 1314     Strep Pneumo Ag Negative    Legionella Antigen, Urine - Urine, Urine, Clean Catch [[id number]]  (Normal) Collected: 12/22/24 0025    Lab Status: Final result Specimen: Urine, Clean Catch Updated: 12/22/24 1314     LEGIONELLA ANTIGEN, URINE Negative            CT Chest With Contrast Diagnostic    Result Date: 12/22/2024  CT CHEST W CONTRAST DIAGNOSTIC Date of Exam: 12/22/2024 5:00 PM EST Indication: sob. Comparison: Chest x-ray dated 12/22/2024 Technique: Axial CT images were obtained of the chest after the uneventful intravenous administration of 100 mL Isovue 300.  Reconstructed coronal and sagittal images were also obtained. Automated exposure control and iterative  construction methods were used. FINDINGS: Thoracic inlet: Unremarkable. Pulmonary arteries: This examination is not optimized for detection of pulmonary emboli. No large central pulmonary embolism is seen. Great vessels: Atherosclerotic plaque is seen within the thoracic aorta and proximal arch vessels. Moderate stenosis at the origin of the innominate artery. Mediastinum/Tasneem: No pathologically enlarged mediastinal lymph nodes are seen. Scattered borderline prominent mediastinal lymph nodes, nonspecific. Calcified right hilar lymph nodes are noted. The esophagus appears unremarkable. Lung parenchyma: Nonspecific groundglass opacities within the left upper and lower lobes. Atypical infiltrates cannot be excluded. Nonspecific interstitial opacities are present bilaterally. No suspicious pulmonary nodules are seen. Right lung granuloma. Trachea and airways: Mild bilateral lower lobe bronchiectasis. Central airways are otherwise unremarkable. Pleural space: No significant pleural effusion or pneumothorax. Heart and pericardium: Coronary artery calcifications. Status post CABG. Pericardial calcifications noted. Chest wall: Partially visualized superior plate compression fracture of L1, of uncertain chronicity. Sternotomy wires are noted. Superficial soft tissues demonstrate no acute abnormality. Upper abdomen: No acute abnormality is identified within the visualized upper abdomen. The spleen is enlarged, measuring 16 cm in length. Numerous splenic granulomas are seen. Right renal cyst is partially imaged.     1.Nonspecific interstitial opacities are present bilaterally. Suspect underlying interstitial lung disease and/or mild interstitial edema. 2.Nonspecific groundglass opacities within the left upper and lower lobes. Atypical infiltrates cannot be excluded. This could also be related to edema. 3.Mild bilateral lower lobe bronchiectasis. 4.Partially visualized superior plate compression fracture of L1, of uncertain  chronicity. 5.Splenomegaly. 6.Additional findings as detailed above. Electronically Signed: Kasi Nice MD  12/22/2024 8:24 PM EST  Workstation ID: [id number]    XR Chest 1 View    Result Date: 12/22/2024  XR CHEST 1 VW Date of Exam: 12/22/2024 4:25 AM EST Indication: DYSPNEA, CARDIAC ORIGIN SUSPECTED hypoxia and cough Comparison: None available. Findings: Patient is status post median sternotomy. Heart size is at the upper limits of normal. Pulmonary vessels are somewhat indistinct compatible pulmonary vascular congestion. Basilar interstitial markings are also prominent suggesting mild interstitial edema. No focal airspace consolidation. No pleural effusion or pneumothorax.     Impression: 1. Borderline heart size with pulmonary vascular congestion and probable mild interstitial edema. No focal airspace consolidation. Electronically Signed: Teddy Jones MD  12/22/2024 9:17 AM EST  Workstation ID: [id number]    XR Outside Films    Result Date: 12/21/2024  This procedure was auto-finalized with no dictation required.             Results for orders placed during the hospital encounter of 12/21/24    Adult Transthoracic Echo Complete W/ Cont if Necessary Per Protocol    Interpretation Summary    Left ventricular ejection fraction appears to be 51 - 55%.    Left ventricular diastolic function is consistent with (grade II w/high LAP) pseudonormalization.    The right ventricular cavity is borderline dilated.    The left atrial cavity is mildly dilated.    Aortic valve maximum pressure gradient is 10 mmHg.    Estimated right ventricular systolic pressure from tricuspid regurgitation is normal (<35 mmHg). Calculated right ventricular systolic pressure from tricuspid regurgitation is 8 mmHg.      Plan for Follow-up of Pending Labs/Results:     Discharge Details        Discharge Medications        Continue These Medications        Instructions Start Date   aspirin 81 MG EC tablet   81 mg, Oral, Daily      atorvastatin 20  "MG tablet  Commonly known as: LIPITOR   20 mg, Oral, Nightly      benazepril 5 MG tablet  Commonly known as: LOTENSIN   2.5 mg, Oral, Daily      cholecalciferol 25 MCG (1000 UT) tablet  Commonly known as: VITAMIN D3   1,000 Units, 2 Times Daily      clopidogrel 75 MG tablet  Commonly known as: PLAVIX   75 mg, Oral, Daily      cyanocobalamin 500 MCG tablet  Commonly known as: VITAMIN B-12   500 mcg, Daily      folic acid 400 MCG tablet  Commonly known as: FOLVITE   400 mcg, Daily      gabapentin 600 MG tablet  Commonly known as: NEURONTIN   take 1 tablet by mouth every morning and take 2 tablets by mouth every evening      ICaps Areds 2 capsule   1 capsule, 2 Times Daily      Linzess 72 MCG capsule capsule  Generic drug: linaclotide   1 capsule, Daily      metoprolol succinate XL 25 MG 24 hr tablet  Commonly known as: TOPROL-XL   25 mg, Oral, Daily      nitroglycerin 0.4 MG SL tablet  Commonly known as: NITROSTAT   0.4 mg, Oral, Every 5 Minutes PRN      pantoprazole 40 MG EC tablet  Commonly known as: PROTONIX   1 tablet, 2 Times Daily      vitamin B-6 100 MG tablet  Commonly known as: PYRIDOXINE   100 mg, Daily      Vitamin C 500 MG capsule   500 mg, 2 Times Daily               Allergies   Allergen Reactions    Hydrocodone Confusion     Pt reports that it \"makes him go crazy\"    Hydrocodone-Acetaminophen Confusion and Other (See Comments)    Morphine And Codeine Other (See Comments)         Discharge Disposition:  Home or Self Care    Diet:  Hospital:No active diet order      Diet Instructions       Diet: Cardiac Diets; Healthy Heart (2-3 Na+); Mechanical Ground (NDD 2); Thin (IDDSI 0)      Discharge Diet: Cardiac Diets    Cardiac Diet: Healthy Heart (2-3 Na+)    Texture: Mechanical Ground (NDD 2)    Fluid Consistency: Thin (IDDSI 0)             Activity:  Activity Instructions       Activity as Tolerated              Restrictions or Other Recommendations:         CODE STATUS:    Code Status and Medical " Interventions: CPR (Attempt to Resuscitate); Full Support   Ordered at: 12/21/24 2321     Code Status (Patient has no pulse and is not breathing):    CPR (Attempt to Resuscitate)     Medical Interventions (Patient has pulse or is breathing):    Full Support       Future Appointments   Date Time Provider Department Center   1/23/2025 10:20 AM Elena Finn APRN [facility]       Additional Instructions for the Follow-ups that You Need to Schedule       Ambulatory Referral to Home Health   As directed      Face to Face Visit Date: 12/23/2024   Follow-up provider for Plan of Care?: I will be treating the patient on an ongoing basis.  Please send me the Plan of Care for signature.   Follow-up provider: ILIANA ALICEA [[id number]]   Reason/Clinical Findings: Pneumonia   Describe mobility limitations that make leaving home difficult: Impaired mobility   Nursing/Therapeutic Services Requested: Skilled Nursing Physical Therapy Occupational Therapy   Skilled nursing orders: Medication education Pain management Cardiopulmonary assessments   PT orders: Therapeutic exercise Gait Training Strengthening Home safety assessment   Weight Bearing Status: As Tolerated   Occupational orders: Activities of daily living Energy conservation Strengthening Home safety assessment   Frequency: 1 Week 1        Discharge Follow-up with PCP   As directed       Currently Documented PCP:    Iliana Alicea DO    PCP Phone Number:    [phone]     Follow Up Details: within one week        Discharge Follow-up with Specified Provider: cardiology as per schedule.   As directed      To: cardiology as per schedule.                      Jeramie Priest MD  12/30/24      Time Spent on Discharge:  I spent  38  minutes on this discharge activity which included: face-to-face encounter with the patient, reviewing the data in the system, coordination of the care with the nursing staff as well as consultants, documentation, and  entering orders.

## 2024-12-31 ENCOUNTER — READMISSION MANAGEMENT (OUTPATIENT)
Dept: CALL CENTER | Facility: HOSPITAL | Age: 84
End: 2024-12-31
Payer: MEDICARE

## 2024-12-31 NOTE — OUTREACH NOTE
COPD/PN Week 1 Survey      Flowsheet Row Responses   [facility] patient discharged from? [facility]   Does the patient have one of the following disease processes/diagnoses(primary or secondary)? Pneumonia   Week 1 attempt successful? Yes   Call start time 1753   Call end time 1800   Discharge diagnosis Pneumonia   Is patient permission given to speak with other caregiver? Yes   List who call center can speak with spouse   Person spoke with today (if not patient) and relationship patient and spouse   Is the patient taking all medications as directed (includes completed medication regime)? Yes   Medication comments No changes   Does the patient have a primary care provider?  Yes   Does the patient have an appointment with their PCP or specialist within 7 days of discharge? Yes   Has the patient kept scheduled appointments due by today? Yes   Comments Had f/u with PCP.  Has appt 1/23 Aak[facility]   What is the Home health agency?  [facility]   Has home health visited the patient within 72 hours of discharge? Yes   Psychosocial issues? No   Did the patient receive a copy of their discharge instructions? Yes   Nursing interventions Reviewed instructions with patient   What is the patient's perception of their health status since discharge? New symptoms unrelated to diagnosis   Is the patient/caregiver able to teach back the hierarchy of who to call/visit for symptoms/problems? PCP, Specialist, Home health nurse, Urgent Care, ED, 911 Yes   Additional teach back comments States when he first gets up he has chest pains and then they go away.  Will have them also when he is doing therapy.  Advised to contact his cardiologist regarding this.  If it worsens, doesn't go away or has soa he will go to ED.   Is the patient/caregiver able to teach back signs and symptoms of worsening condition: Fever/chills, Shortness of breath, Chest pain   Week 1 call completed? Yes   Wrap up additional comments Pt to contact  cardiology regarding chest pain that comes and goes.  If worsens will go to ED.   Call end time 1800            Nkechi DIANE - Licensed Nurse

## 2025-01-02 ENCOUNTER — TELEPHONE (OUTPATIENT)
Dept: CARDIOLOGY | Facility: CLINIC | Age: 85
End: 2025-01-02
Payer: MEDICARE

## 2025-01-02 NOTE — TELEPHONE ENCOUNTER
"Patient's wife called asking if his appt could be moved up d/t chest pain. She reports he is unable to participate with home health activities d/t chest pain. He has had to take nitro, which she said relieved the pain after awhile. Reports BP and HR are, \"okay\". Moved his appt up to tomorrow in [place].   "

## 2025-01-02 NOTE — PROGRESS NOTES
"    Cardiology Outpatient Visit      Identification: Albin Andino is a 84 y.o. male who resides in [place], [place].     Reason for visit:  Coronary Artery Disease      Subjective      Unfortunately, Mr. Andino is back sooner than expected with recurrence of anginal symptoms.  He typically experiences some chest discomfort first thing in the morning when he is getting dressed.  He will sit down and it will go away.  He is also experienced angina during physical therapy, particularly when the therapist was telling him to stand repeatedly from a seated position.  He states that initially after high risk PCI of his vein grafts last September, he was not having the symptoms.  Antianginal medical therapy presently includes metoprolol, isosorbide.    The patient was hospitalized at [facility] prior to the holidays with influenza.  Experienced type II myocardial infarction.    Patient is accompanied today by daughter and wife.    Review of Systems   Cardiovascular:  Positive for chest pain.       Allergies   Allergen Reactions    Hydrocodone Confusion     Pt reports that it \"makes him go crazy\"    Hydrocodone-Acetaminophen Confusion and Other (See Comments)    Morphine And Codeine Other (See Comments)         Current Outpatient Medications   Medication Instructions    amLODIPine (NORVASC) 2.5 mg, Oral, Daily    aspirin 81 mg, Oral, Daily    atorvastatin (LIPITOR) 20 mg, Oral, Nightly    cholecalciferol (VITAMIN D3) 1,000 Units, 2 Times Daily    clopidogrel (PLAVIX) 75 mg, Oral, Daily    cyanocobalamin (VITAMIN B-12) 500 mcg, Daily    folic acid (FOLVITE) 400 mcg, Daily    gabapentin (NEURONTIN) 600 MG tablet take 1 tablet by mouth every morning and take 2 tablets by mouth every evening    isosorbide mononitrate (IMDUR) 60 mg, Oral, Daily    Linzess 72 MCG capsule capsule 1 capsule, Daily    metoprolol succinate XL (TOPROL-XL) 25 mg, Oral, Daily    Multiple Vitamins-Minerals (ICAPS AREDS 2) capsule 1 capsule, 2 " "Times Daily    nitroglycerin (NITROSTAT) 0.4 mg, Oral, Every 5 Minutes PRN    pantoprazole (PROTONIX) 40 MG EC tablet 1 tablet, 2 Times Daily    vitamin B-6 (PYRIDOXINE) 100 mg, Daily    Vitamin C 500 mg, 2 Times Daily         Objective     /72 (BP Location: Right arm, Patient Position: Sitting, Cuff Size: Adult)   Pulse 67   Ht 175.3 cm (69\")   Wt 79 kg (174 lb 3.2 oz)   SpO2 99%   BMI 25.72 kg/m²       Constitutional:       Appearance: Healthy appearance.   Eyes:      General: No scleral icterus.  Neck:      Thyroid: No thyroid mass.      Vascular: No carotid bruit or JVD. JVD normal.   Pulmonary:      Effort: Pulmonary effort is normal.      Breath sounds: Normal breath sounds.   Cardiovascular:      Normal rate. Regular rhythm.      Murmurs: There is no murmur.      No gallop.    Edema:     Peripheral edema absent.   Skin:     General: Skin is warm. There is no cyanosis.   Neurological:      General: No focal deficit present.      Mental Status: Alert.   Psychiatric:         Attention and Perception: Attention normal.         Result Review  (reviewed with patient):            Lab Results   Component Value Date    GLUCOSE 100 (H) 12/21/2024    BUN 12 12/21/2024    CREATININE 0.74 (L) 12/21/2024     12/21/2024    K 4.4 12/21/2024     12/21/2024    CALCIUM 8.9 12/21/2024    PROTEINTOT 6.6 12/21/2024    ALBUMIN 4.1 12/21/2024    ALT 21 12/21/2024    AST 42 (H) 12/21/2024    ALKPHOS 96 12/21/2024    BILITOT 1.0 12/21/2024    GLOB 2.5 12/21/2024    AGRATIO 1.6 12/21/2024    BCR 16.2 12/21/2024    ANIONGAP 11.0 12/21/2024    EGFR 88.2 12/21/2024     Lab Results   Component Value Date    WBC 3.81 12/23/2024    HGB 12.6 (L) 12/23/2024    HCT 37.9 12/23/2024    MCV 86.9 12/23/2024     (L) 12/23/2024     Lab Results   Component Value Date    CHOL 96 08/28/2016    CHLPL 138 06/15/2020    TRIG 121 06/15/2020    HDL 41 06/15/2020    LDL 73 06/15/2020     Lab Results   Component Value Date    " HGBA1C 4.50 08/28/2016           Assessment     Diagnoses and all orders for this visit:    1. Coronary artery disease involving native coronary artery of native heart with angina pectoris  Overview:  CABG in the 1980s  PCI 2006 - 2007  Cardiac catheterization (9/6/2024): Occluded native circulation.  Patent LIMA to LAD.  Severely degenerated SVG to RCA and SVG to OM  Cardiac catheterization (9/16/2024): Successful high risk PCI of degenerated sequential SVG to RPDA/RPL--proximal and distal segments--using large-caliber drug-eluting stents.  Residual stenosis of vein graft supplying RPL not treated. Successful high risk PCI of degenerated SVG to OM with large caliber YAMILETH    Assessment & Plan:  Recurrent anginal symptoms, presently CCS class III  Unfortunately, I suspect recurrence of SVG stenosis likely.  We discussed repeat cardiac catheterization and repeat SVG intervention, and particularly its attendant risks including acute graft closure and potentially death.  I recommend optimizing antianginal medicines, particularly as present level of symptoms is tolerable, although not ideal  Change benazepril to amlodipine 2.5 mg daily  Continue metoprolol and isosorbide.  Isosorbide dose may be increased as needed  Continue DAPT  If symptoms become intolerable despite maximized antianginal therapy, may have to consider repeat LHC and high risk PCI    Orders:  -     metoprolol succinate XL (TOPROL-XL) 25 MG 24 hr tablet; Take 1 tablet by mouth Daily.  Dispense: 90 tablet; Refill: 3  -     Discontinue: nitroglycerin (NITROSTAT) 0.4 MG SL tablet; Take 1 tablet by mouth Every 5 (Five) Minutes As Needed for Chest Pain.  -     isosorbide mononitrate (IMDUR) 60 MG 24 hr tablet; Take 1 tablet by mouth Daily.  Dispense: 90 tablet; Refill: 3  -     amLODIPine (NORVASC) 2.5 MG tablet; Take 1 tablet by mouth Daily.  Dispense: 90 tablet; Refill: 3  -     nitroglycerin (NITROSTAT) 0.4 MG SL tablet; Take 1 tablet by mouth Every 5  (Five) Minutes As Needed for Chest Pain.  Dispense: 25 tablet; Refill: 11    2. Hyperlipidemia LDL goal <70  -     atorvastatin (LIPITOR) 20 MG tablet; Take 1 tablet by mouth Every Night.  Dispense: 90 tablet; Refill: 3          Plan   Change benazepril to amlodipine 2.5 mg daily for antianginal effect  Continue metoprolol and isosorbide.  May uptitrate isosorbide as needed      Follow-up   Return in about 3 months (around 4/3/2025).        John Cruz MD, FACC, [facility]  1/3/2025

## 2025-01-03 ENCOUNTER — OFFICE VISIT (OUTPATIENT)
Dept: CARDIOLOGY | Facility: CLINIC | Age: 85
End: 2025-01-03
Payer: MEDICARE

## 2025-01-03 VITALS
BODY MASS INDEX: 25.8 KG/M2 | OXYGEN SATURATION: 99 % | HEIGHT: 69 IN | HEART RATE: 67 BPM | WEIGHT: 174.2 LBS | DIASTOLIC BLOOD PRESSURE: 72 MMHG | SYSTOLIC BLOOD PRESSURE: 132 MMHG

## 2025-01-03 DIAGNOSIS — E78.5 HYPERLIPIDEMIA LDL GOAL <70: ICD-10-CM

## 2025-01-03 DIAGNOSIS — I25.119 CORONARY ARTERY DISEASE INVOLVING NATIVE CORONARY ARTERY OF NATIVE HEART WITH ANGINA PECTORIS: ICD-10-CM

## 2025-01-03 PROBLEM — I21.4 NSTEMI (NON-ST ELEVATED MYOCARDIAL INFARCTION): Status: RESOLVED | Noted: 2024-12-21 | Resolved: 2025-01-03

## 2025-01-03 RX ORDER — ATORVASTATIN CALCIUM 20 MG/1
20 TABLET, FILM COATED ORAL NIGHTLY
Qty: 90 TABLET | Refills: 3 | Status: SHIPPED | OUTPATIENT
Start: 2025-01-03

## 2025-01-03 RX ORDER — NITROGLYCERIN 0.4 MG/1
0.4 TABLET SUBLINGUAL
Start: 2025-01-03 | End: 2025-01-03 | Stop reason: SDUPTHER

## 2025-01-03 RX ORDER — AMLODIPINE BESYLATE 2.5 MG/1
2.5 TABLET ORAL DAILY
Qty: 90 TABLET | Refills: 3 | Status: SHIPPED | OUTPATIENT
Start: 2025-01-03

## 2025-01-03 RX ORDER — ISOSORBIDE MONONITRATE 60 MG/1
60 TABLET, EXTENDED RELEASE ORAL DAILY
Qty: 90 TABLET | Refills: 3 | Status: SHIPPED | OUTPATIENT
Start: 2025-01-03

## 2025-01-03 RX ORDER — METOPROLOL SUCCINATE 25 MG/1
25 TABLET, EXTENDED RELEASE ORAL DAILY
Qty: 90 TABLET | Refills: 3 | Status: SHIPPED | OUTPATIENT
Start: 2025-01-03

## 2025-01-03 RX ORDER — NITROGLYCERIN 0.4 MG/1
0.4 TABLET SUBLINGUAL
Qty: 25 TABLET | Refills: 11 | Status: SHIPPED | OUTPATIENT
Start: 2025-01-03

## 2025-01-03 NOTE — ASSESSMENT & PLAN NOTE
Recurrent anginal symptoms, presently CCS class III  Unfortunately, I suspect recurrence of SVG stenosis likely.  We discussed repeat cardiac catheterization and repeat SVG intervention, and particularly its attendant risks including acute graft closure and potentially death.  I recommend optimizing antianginal medicines, particularly as present level of symptoms is tolerable, although not ideal  Change benazepril to amlodipine 2.5 mg daily  Continue metoprolol and isosorbide.  Isosorbide dose may be increased as needed  Continue DAPT  If symptoms become intolerable despite maximized antianginal therapy, may have to consider repeat LHC and high risk PCI

## 2025-01-10 ENCOUNTER — READMISSION MANAGEMENT (OUTPATIENT)
Dept: CALL CENTER | Facility: HOSPITAL | Age: 85
End: 2025-01-10
Payer: MEDICARE

## 2025-01-10 NOTE — OUTREACH NOTE
COPD/PN Week 2 Survey      Flowsheet Row Responses   [facility] facility patient discharged from? [place]   Does the patient have one of the following disease processes/diagnoses(primary or secondary)? Pneumonia   Week 2 attempt successful? No   Unsuccessful attempts Attempt 1            Lenka TURK - Registered Nurse

## 2025-01-16 ENCOUNTER — READMISSION MANAGEMENT (OUTPATIENT)
Dept: CALL CENTER | Facility: HOSPITAL | Age: 85
End: 2025-01-16
Payer: MEDICARE

## 2025-01-16 NOTE — OUTREACH NOTE
COPD/PN Week 2 Survey      Flowsheet Row Responses   [facility] facility patient discharged from? [place]   Does the patient have one of the following disease processes/diagnoses(primary or secondary)? Pneumonia   Week 2 attempt successful? No   Unsuccessful attempts Attempt 2            MARLIN VASQUEZ - Registered Nurse

## 2025-01-25 ENCOUNTER — READMISSION MANAGEMENT (OUTPATIENT)
Dept: CALL CENTER | Facility: HOSPITAL | Age: 85
End: 2025-01-25
Payer: MEDICARE

## 2025-01-25 ENCOUNTER — HOSPITAL ENCOUNTER (INPATIENT)
Facility: HOSPITAL | Age: 85
LOS: 1 days | Discharge: HOME | DRG: 250 | End: 2025-01-27
Attending: HOSPITALIST | Admitting: INTERNAL MEDICINE
Payer: MEDICARE

## 2025-01-25 DIAGNOSIS — I21.4 NSTEMI (NON-ST ELEVATED MYOCARDIAL INFARCTION): Primary | ICD-10-CM

## 2025-01-25 DIAGNOSIS — I25.119 CORONARY ARTERY DISEASE INVOLVING NATIVE CORONARY ARTERY OF NATIVE HEART WITH ANGINA PECTORIS: ICD-10-CM

## 2025-01-25 LAB
ALBUMIN SERPL-MCNC: 3.9 G/DL (ref 3.5–5.2)
ALBUMIN/GLOB SERPL: 2 G/DL
ALP SERPL-CCNC: 89 U/L (ref 39–117)
ALT SERPL W P-5'-P-CCNC: 15 U/L (ref 1–41)
ANION GAP SERPL CALCULATED.3IONS-SCNC: 8 MMOL/L (ref 5–15)
APTT PPP: 31.7 SECONDS (ref 60–90)
AST SERPL-CCNC: 46 U/L (ref 1–40)
BASOPHILS # BLD AUTO: 0 10*3/MM3 (ref 0–0.2)
BASOPHILS NFR BLD AUTO: 0 % (ref 0–1.5)
BILIRUB SERPL-MCNC: 0.6 MG/DL (ref 0–1.2)
BUN SERPL-MCNC: 14 MG/DL (ref 8–23)
BUN/CREAT SERPL: 16.5 (ref 7–25)
CALCIUM SPEC-SCNC: 9.4 MG/DL (ref 8.6–10.5)
CHLORIDE SERPL-SCNC: 105 MMOL/L (ref 98–107)
CO2 SERPL-SCNC: 29 MMOL/L (ref 22–29)
CREAT SERPL-MCNC: 0.85 MG/DL (ref 0.76–1.27)
DEPRECATED RDW RBC AUTO: 47.1 FL (ref 37–54)
EGFRCR SERPLBLD CKD-EPI 2021: 84.6 ML/MIN/1.73
EOSINOPHIL # BLD AUTO: 0.12 10*3/MM3 (ref 0–0.4)
EOSINOPHIL NFR BLD AUTO: 2 % (ref 0.3–6.2)
ERYTHROCYTE [DISTWIDTH] IN BLOOD BY AUTOMATED COUNT: 14.6 % (ref 12.3–15.4)
GEN 5 1HR TROPONIN T REFLEX: 541 NG/L
GLOBULIN UR ELPH-MCNC: 2 GM/DL
GLUCOSE SERPL-MCNC: 117 MG/DL (ref 65–99)
HCT VFR BLD AUTO: 34.8 % (ref 37.5–51)
HGB BLD-MCNC: 11.6 G/DL (ref 13–17.7)
IMM GRANULOCYTES # BLD AUTO: 0.02 10*3/MM3 (ref 0–0.05)
IMM GRANULOCYTES NFR BLD AUTO: 0.3 % (ref 0–0.5)
INR PPP: 1.14 (ref 0.89–1.12)
LYMPHOCYTES # BLD AUTO: 0.78 10*3/MM3 (ref 0.7–3.1)
LYMPHOCYTES NFR BLD AUTO: 12.7 % (ref 19.6–45.3)
MAGNESIUM SERPL-MCNC: 1.8 MG/DL (ref 1.6–2.4)
MCH RBC QN AUTO: 29.7 PG (ref 26.6–33)
MCHC RBC AUTO-ENTMCNC: 33.3 G/DL (ref 31.5–35.7)
MCV RBC AUTO: 89 FL (ref 79–97)
MONOCYTES # BLD AUTO: 0.46 10*3/MM3 (ref 0.1–0.9)
MONOCYTES NFR BLD AUTO: 7.5 % (ref 5–12)
NEUTROPHILS NFR BLD AUTO: 4.75 10*3/MM3 (ref 1.7–7)
NEUTROPHILS NFR BLD AUTO: 77.5 % (ref 42.7–76)
NRBC BLD AUTO-RTO: 0 /100 WBC (ref 0–0.2)
NT-PROBNP SERPL-MCNC: 2589 PG/ML (ref 0–1800)
PLATELET # BLD AUTO: 132 10*3/MM3 (ref 140–450)
PMV BLD AUTO: 10.1 FL (ref 6–12)
POTASSIUM SERPL-SCNC: 3.9 MMOL/L (ref 3.5–5.2)
PROT SERPL-MCNC: 5.9 G/DL (ref 6–8.5)
PROTHROMBIN TIME: 14.7 SECONDS (ref 12.2–14.5)
RBC # BLD AUTO: 3.91 10*6/MM3 (ref 4.14–5.8)
SODIUM SERPL-SCNC: 142 MMOL/L (ref 136–145)
TROPONIN T % DELTA: 10
TROPONIN T NUMERIC DELTA: 49 NG/L
TROPONIN T SERPL HS-MCNC: 492 NG/L
UFH PPP CHRO-ACNC: 0.1 IU/ML (ref 0.3–0.7)
WBC NRBC COR # BLD AUTO: 6.13 10*3/MM3 (ref 3.4–10.8)

## 2025-01-25 PROCEDURE — 85025 COMPLETE CBC W/AUTO DIFF WBC: CPT | Performed by: INTERNAL MEDICINE

## 2025-01-25 PROCEDURE — 85730 THROMBOPLASTIN TIME PARTIAL: CPT | Performed by: INTERNAL MEDICINE

## 2025-01-25 PROCEDURE — G0378 HOSPITAL OBSERVATION PER HR: HCPCS

## 2025-01-25 PROCEDURE — 25010000002 HEPARIN (PORCINE) 25000-0.45 UT/250ML-% SOLUTION: Performed by: INTERNAL MEDICINE

## 2025-01-25 PROCEDURE — 80053 COMPREHEN METABOLIC PANEL: CPT | Performed by: INTERNAL MEDICINE

## 2025-01-25 PROCEDURE — 85520 HEPARIN ASSAY: CPT | Performed by: INTERNAL MEDICINE

## 2025-01-25 PROCEDURE — 25010000002 HEPARIN (PORCINE) PER 1000 UNITS

## 2025-01-25 PROCEDURE — 93010 ELECTROCARDIOGRAM REPORT: CPT | Performed by: INTERNAL MEDICINE

## 2025-01-25 PROCEDURE — 99223 1ST HOSP IP/OBS HIGH 75: CPT | Performed by: INTERNAL MEDICINE

## 2025-01-25 PROCEDURE — 83880 ASSAY OF NATRIURETIC PEPTIDE: CPT | Performed by: INTERNAL MEDICINE

## 2025-01-25 PROCEDURE — 85610 PROTHROMBIN TIME: CPT | Performed by: INTERNAL MEDICINE

## 2025-01-25 PROCEDURE — 84484 ASSAY OF TROPONIN QUANT: CPT | Performed by: INTERNAL MEDICINE

## 2025-01-25 PROCEDURE — 83735 ASSAY OF MAGNESIUM: CPT | Performed by: INTERNAL MEDICINE

## 2025-01-25 PROCEDURE — 93005 ELECTROCARDIOGRAM TRACING: CPT | Performed by: INTERNAL MEDICINE

## 2025-01-25 RX ORDER — AMOXICILLIN 250 MG
2 CAPSULE ORAL 2 TIMES DAILY PRN
Status: DISCONTINUED | OUTPATIENT
Start: 2025-01-25 | End: 2025-01-27 | Stop reason: HOSPADM

## 2025-01-25 RX ORDER — FOLIC ACID 0.4 MG
400 TABLET ORAL DAILY
Status: DISCONTINUED | OUTPATIENT
Start: 2025-01-26 | End: 2025-01-27 | Stop reason: HOSPADM

## 2025-01-25 RX ORDER — BISACODYL 5 MG/1
5 TABLET, DELAYED RELEASE ORAL DAILY PRN
Status: DISCONTINUED | OUTPATIENT
Start: 2025-01-25 | End: 2025-01-27 | Stop reason: HOSPADM

## 2025-01-25 RX ORDER — ATORVASTATIN CALCIUM 20 MG/1
20 TABLET, FILM COATED ORAL NIGHTLY
Status: DISCONTINUED | OUTPATIENT
Start: 2025-01-25 | End: 2025-01-27 | Stop reason: HOSPADM

## 2025-01-25 RX ORDER — SODIUM CHLORIDE 9 MG/ML
40 INJECTION, SOLUTION INTRAVENOUS AS NEEDED
Status: DISCONTINUED | OUTPATIENT
Start: 2025-01-25 | End: 2025-01-27

## 2025-01-25 RX ORDER — ACETAMINOPHEN 160 MG/5ML
650 SOLUTION ORAL EVERY 4 HOURS PRN
Status: DISCONTINUED | OUTPATIENT
Start: 2025-01-25 | End: 2025-01-25

## 2025-01-25 RX ORDER — PANTOPRAZOLE SODIUM 40 MG/1
40 TABLET, DELAYED RELEASE ORAL 2 TIMES DAILY
Status: DISCONTINUED | OUTPATIENT
Start: 2025-01-25 | End: 2025-01-27 | Stop reason: HOSPADM

## 2025-01-25 RX ORDER — SODIUM CHLORIDE 0.9 % (FLUSH) 0.9 %
10 SYRINGE (ML) INJECTION AS NEEDED
Status: DISCONTINUED | OUTPATIENT
Start: 2025-01-25 | End: 2025-01-27 | Stop reason: HOSPADM

## 2025-01-25 RX ORDER — ASCORBIC ACID 500 MG
500 TABLET ORAL DAILY
Status: DISCONTINUED | OUTPATIENT
Start: 2025-01-26 | End: 2025-01-27 | Stop reason: HOSPADM

## 2025-01-25 RX ORDER — GABAPENTIN 400 MG/1
1200 CAPSULE ORAL NIGHTLY
Status: DISCONTINUED | OUTPATIENT
Start: 2025-01-25 | End: 2025-01-27 | Stop reason: HOSPADM

## 2025-01-25 RX ORDER — SODIUM CHLORIDE 0.9 % (FLUSH) 0.9 %
10 SYRINGE (ML) INJECTION EVERY 12 HOURS SCHEDULED
Status: DISCONTINUED | OUTPATIENT
Start: 2025-01-25 | End: 2025-01-27

## 2025-01-25 RX ORDER — ACETAMINOPHEN 650 MG/1
650 SUPPOSITORY RECTAL EVERY 4 HOURS PRN
Status: DISCONTINUED | OUTPATIENT
Start: 2025-01-25 | End: 2025-01-25

## 2025-01-25 RX ORDER — HEPARIN SODIUM 1000 [USP'U]/ML
25 INJECTION, SOLUTION INTRAVENOUS; SUBCUTANEOUS AS NEEDED
Status: DISCONTINUED | OUTPATIENT
Start: 2025-01-25 | End: 2025-01-25

## 2025-01-25 RX ORDER — MAGNESIUM SULFATE HEPTAHYDRATE 40 MG/ML
4 INJECTION, SOLUTION INTRAVENOUS ONCE
Status: COMPLETED | OUTPATIENT
Start: 2025-01-26 | End: 2025-01-26

## 2025-01-25 RX ORDER — CLOPIDOGREL BISULFATE 75 MG/1
75 TABLET ORAL DAILY
Status: DISCONTINUED | OUTPATIENT
Start: 2025-01-26 | End: 2025-01-27 | Stop reason: HOSPADM

## 2025-01-25 RX ORDER — ISOSORBIDE MONONITRATE 60 MG/1
60 TABLET, EXTENDED RELEASE ORAL DAILY
Status: DISCONTINUED | OUTPATIENT
Start: 2025-01-26 | End: 2025-01-27 | Stop reason: HOSPADM

## 2025-01-25 RX ORDER — BISACODYL 10 MG
10 SUPPOSITORY, RECTAL RECTAL DAILY PRN
Status: DISCONTINUED | OUTPATIENT
Start: 2025-01-25 | End: 2025-01-27 | Stop reason: HOSPADM

## 2025-01-25 RX ORDER — LANOLIN ALCOHOL/MO/W.PET/CERES
100 CREAM (GRAM) TOPICAL DAILY
Status: DISCONTINUED | OUTPATIENT
Start: 2025-01-26 | End: 2025-01-27 | Stop reason: HOSPADM

## 2025-01-25 RX ORDER — POLYETHYLENE GLYCOL 3350 17 G/17G
17 POWDER, FOR SOLUTION ORAL DAILY PRN
Status: DISCONTINUED | OUTPATIENT
Start: 2025-01-25 | End: 2025-01-27 | Stop reason: HOSPADM

## 2025-01-25 RX ORDER — ACETAMINOPHEN 325 MG/1
650 TABLET ORAL EVERY 4 HOURS PRN
Status: DISCONTINUED | OUTPATIENT
Start: 2025-01-25 | End: 2025-01-25

## 2025-01-25 RX ORDER — AMLODIPINE BESYLATE 2.5 MG/1
2.5 TABLET ORAL DAILY
Status: DISCONTINUED | OUTPATIENT
Start: 2025-01-26 | End: 2025-01-27 | Stop reason: HOSPADM

## 2025-01-25 RX ORDER — HEPARIN SODIUM 1000 [USP'U]/ML
3900 INJECTION, SOLUTION INTRAVENOUS; SUBCUTANEOUS ONCE
Status: COMPLETED | OUTPATIENT
Start: 2025-01-25 | End: 2025-01-25

## 2025-01-25 RX ORDER — LANOLIN ALCOHOL/MO/W.PET/CERES
500 CREAM (GRAM) TOPICAL DAILY
Status: DISCONTINUED | OUTPATIENT
Start: 2025-01-26 | End: 2025-01-27 | Stop reason: HOSPADM

## 2025-01-25 RX ORDER — ASPIRIN 81 MG/1
81 TABLET ORAL DAILY
Status: DISCONTINUED | OUTPATIENT
Start: 2025-01-26 | End: 2025-01-27 | Stop reason: HOSPADM

## 2025-01-25 RX ORDER — METOPROLOL SUCCINATE 25 MG/1
25 TABLET, EXTENDED RELEASE ORAL DAILY
Status: DISCONTINUED | OUTPATIENT
Start: 2025-01-26 | End: 2025-01-27 | Stop reason: HOSPADM

## 2025-01-25 RX ORDER — HEPARIN SODIUM 10000 [USP'U]/100ML
16 INJECTION, SOLUTION INTRAVENOUS
Status: DISCONTINUED | OUTPATIENT
Start: 2025-01-25 | End: 2025-01-27

## 2025-01-25 RX ORDER — HEPARIN SODIUM 1000 [USP'U]/ML
50 INJECTION, SOLUTION INTRAVENOUS; SUBCUTANEOUS AS NEEDED
Status: DISCONTINUED | OUTPATIENT
Start: 2025-01-25 | End: 2025-01-25

## 2025-01-25 RX ADMIN — HEPARIN SODIUM 3900 UNITS: 1000 INJECTION INTRAVENOUS; SUBCUTANEOUS at 22:59

## 2025-01-25 RX ADMIN — HEPARIN SODIUM 12 UNITS/KG/HR: 10000 INJECTION, SOLUTION INTRAVENOUS at 21:28

## 2025-01-25 RX ADMIN — PANTOPRAZOLE SODIUM 40 MG: 40 TABLET, DELAYED RELEASE ORAL at 22:31

## 2025-01-25 RX ADMIN — ATORVASTATIN CALCIUM 20 MG: 20 TABLET, FILM COATED ORAL at 22:31

## 2025-01-25 RX ADMIN — GABAPENTIN 1200 MG: 400 CAPSULE ORAL at 22:31

## 2025-01-25 NOTE — Clinical Note
First balloon inflation max pressure = 18 pako. First balloon inflation duration = 40 seconds. Second inflation of balloon - Max pressure = 18 pako. 2nd Inflation of balloon - Duration = 5 seconds.

## 2025-01-25 NOTE — Clinical Note
First balloon inflation max pressure = 12 pako. First balloon inflation duration = 15 seconds. Second inflation of balloon - Max pressure = 12 pako. 2nd Inflation of balloon - Duration = 5 seconds. Third inflation of balloon - Max pressure = 12 pako. 3rd Inflation of balloon - Duration = 10 seconds. Fourth inflation of balloon - Max pressure = 12 pako. 4th Inflation of balloon - Duration = 10 seconds.

## 2025-01-25 NOTE — Clinical Note
First balloon inflation max pressure = 16 pako. First balloon inflation duration = 30 seconds. Second inflation of balloon - Max pressure = 16 pako. 2nd Inflation of balloon - Duration = 30 seconds. Third inflation of balloon - Max pressure = 16 pako. 3rd Inflation of balloon - Duration = 60 seconds. Fourth inflation of balloon - Max pressure = 16 pako. 4th Inflation of balloon - Duration = 45 seconds.

## 2025-01-26 ENCOUNTER — APPOINTMENT (OUTPATIENT)
Dept: GENERAL RADIOLOGY | Facility: HOSPITAL | Age: 85
DRG: 250 | End: 2025-01-26
Payer: MEDICARE

## 2025-01-26 LAB
ANION GAP SERPL CALCULATED.3IONS-SCNC: 7 MMOL/L (ref 5–15)
BASOPHILS # BLD AUTO: 0 10*3/MM3 (ref 0–0.2)
BASOPHILS NFR BLD AUTO: 0 % (ref 0–1.5)
BUN SERPL-MCNC: 12 MG/DL (ref 8–23)
BUN/CREAT SERPL: 15.6 (ref 7–25)
CALCIUM SPEC-SCNC: 9.2 MG/DL (ref 8.6–10.5)
CHLORIDE SERPL-SCNC: 103 MMOL/L (ref 98–107)
CHOLEST SERPL-MCNC: 111 MG/DL (ref 0–200)
CO2 SERPL-SCNC: 30 MMOL/L (ref 22–29)
CREAT SERPL-MCNC: 0.77 MG/DL (ref 0.76–1.27)
DEPRECATED RDW RBC AUTO: 47.7 FL (ref 37–54)
EGFRCR SERPLBLD CKD-EPI 2021: 87.2 ML/MIN/1.73
EOSINOPHIL # BLD AUTO: 0.16 10*3/MM3 (ref 0–0.4)
EOSINOPHIL NFR BLD AUTO: 2.8 % (ref 0.3–6.2)
ERYTHROCYTE [DISTWIDTH] IN BLOOD BY AUTOMATED COUNT: 14.7 % (ref 12.3–15.4)
GLUCOSE SERPL-MCNC: 87 MG/DL (ref 65–99)
HCT VFR BLD AUTO: 34.8 % (ref 37.5–51)
HDLC SERPL-MCNC: 36 MG/DL (ref 40–60)
HGB BLD-MCNC: 11.3 G/DL (ref 13–17.7)
IMM GRANULOCYTES # BLD AUTO: 0.03 10*3/MM3 (ref 0–0.05)
IMM GRANULOCYTES NFR BLD AUTO: 0.5 % (ref 0–0.5)
LDLC SERPL CALC-MCNC: 57 MG/DL (ref 0–100)
LDLC/HDLC SERPL: 1.55 {RATIO}
LYMPHOCYTES # BLD AUTO: 0.87 10*3/MM3 (ref 0.7–3.1)
LYMPHOCYTES NFR BLD AUTO: 15.1 % (ref 19.6–45.3)
MAGNESIUM SERPL-MCNC: 2.5 MG/DL (ref 1.6–2.4)
MCH RBC QN AUTO: 29.2 PG (ref 26.6–33)
MCHC RBC AUTO-ENTMCNC: 32.5 G/DL (ref 31.5–35.7)
MCV RBC AUTO: 89.9 FL (ref 79–97)
MONOCYTES # BLD AUTO: 0.52 10*3/MM3 (ref 0.1–0.9)
MONOCYTES NFR BLD AUTO: 9 % (ref 5–12)
NEUTROPHILS NFR BLD AUTO: 4.18 10*3/MM3 (ref 1.7–7)
NEUTROPHILS NFR BLD AUTO: 72.6 % (ref 42.7–76)
NRBC BLD AUTO-RTO: 0 /100 WBC (ref 0–0.2)
PLATELET # BLD AUTO: 124 10*3/MM3 (ref 140–450)
PMV BLD AUTO: 10.4 FL (ref 6–12)
POTASSIUM SERPL-SCNC: 3.7 MMOL/L (ref 3.5–5.2)
QT INTERVAL: 434 MS
QT INTERVAL: 454 MS
QTC INTERVAL: 447 MS
QTC INTERVAL: 479 MS
RBC # BLD AUTO: 3.87 10*6/MM3 (ref 4.14–5.8)
SODIUM SERPL-SCNC: 140 MMOL/L (ref 136–145)
TRIGL SERPL-MCNC: 96 MG/DL (ref 0–150)
TROPONIN T SERPL HS-MCNC: 530 NG/L
TROPONIN T SERPL HS-MCNC: 698 NG/L
TROPONIN T SERPL HS-MCNC: 811 NG/L
UFH PPP CHRO-ACNC: 0.28 IU/ML (ref 0.3–0.7)
UFH PPP CHRO-ACNC: 0.36 IU/ML (ref 0.3–0.7)
UFH PPP CHRO-ACNC: 0.47 IU/ML (ref 0.3–0.7)
VLDLC SERPL-MCNC: 18 MG/DL (ref 5–40)
WBC NRBC COR # BLD AUTO: 5.76 10*3/MM3 (ref 3.4–10.8)

## 2025-01-26 PROCEDURE — 71045 X-RAY EXAM CHEST 1 VIEW: CPT

## 2025-01-26 PROCEDURE — 83735 ASSAY OF MAGNESIUM: CPT | Performed by: INTERNAL MEDICINE

## 2025-01-26 PROCEDURE — 93005 ELECTROCARDIOGRAM TRACING: CPT | Performed by: INTERNAL MEDICINE

## 2025-01-26 PROCEDURE — 84484 ASSAY OF TROPONIN QUANT: CPT | Performed by: INTERNAL MEDICINE

## 2025-01-26 PROCEDURE — 85025 COMPLETE CBC W/AUTO DIFF WBC: CPT | Performed by: INTERNAL MEDICINE

## 2025-01-26 PROCEDURE — 25010000002 MAGNESIUM SULFATE 4 GM/100ML SOLUTION: Performed by: INTERNAL MEDICINE

## 2025-01-26 PROCEDURE — 85520 HEPARIN ASSAY: CPT

## 2025-01-26 PROCEDURE — 99222 1ST HOSP IP/OBS MODERATE 55: CPT | Performed by: INTERNAL MEDICINE

## 2025-01-26 PROCEDURE — 99232 SBSQ HOSP IP/OBS MODERATE 35: CPT | Performed by: INTERNAL MEDICINE

## 2025-01-26 PROCEDURE — 93010 ELECTROCARDIOGRAM REPORT: CPT | Performed by: INTERNAL MEDICINE

## 2025-01-26 PROCEDURE — 84484 ASSAY OF TROPONIN QUANT: CPT | Performed by: PHYSICIAN ASSISTANT

## 2025-01-26 PROCEDURE — 97161 PT EVAL LOW COMPLEX 20 MIN: CPT

## 2025-01-26 PROCEDURE — 80048 BASIC METABOLIC PNL TOTAL CA: CPT | Performed by: INTERNAL MEDICINE

## 2025-01-26 PROCEDURE — 80061 LIPID PANEL: CPT | Performed by: INTERNAL MEDICINE

## 2025-01-26 RX ADMIN — FOLIC ACID TAB 400 MCG 400 MCG: 400 TAB at 09:28

## 2025-01-26 RX ADMIN — Medication 10 ML: at 20:37

## 2025-01-26 RX ADMIN — PANTOPRAZOLE SODIUM 40 MG: 40 TABLET, DELAYED RELEASE ORAL at 09:27

## 2025-01-26 RX ADMIN — Medication 10 ML: at 09:29

## 2025-01-26 RX ADMIN — GABAPENTIN 1200 MG: 400 CAPSULE ORAL at 20:36

## 2025-01-26 RX ADMIN — AMLODIPINE BESYLATE 2.5 MG: 2.5 TABLET ORAL at 09:27

## 2025-01-26 RX ADMIN — PANTOPRAZOLE SODIUM 40 MG: 40 TABLET, DELAYED RELEASE ORAL at 20:37

## 2025-01-26 RX ADMIN — ISOSORBIDE MONONITRATE 60 MG: 60 TABLET, EXTENDED RELEASE ORAL at 09:27

## 2025-01-26 RX ADMIN — CYANOCOBALAMIN TAB 1000 MCG 500 MCG: 1000 TAB at 09:28

## 2025-01-26 RX ADMIN — CLOPIDOGREL BISULFATE 75 MG: 75 TABLET ORAL at 09:27

## 2025-01-26 RX ADMIN — THIAMINE HCL TAB 100 MG 100 MG: 100 TAB at 09:28

## 2025-01-26 RX ADMIN — MAGNESIUM SULFATE IN WATER FOR 4 G: 40 INJECTION INTRAVENOUS at 00:23

## 2025-01-26 RX ADMIN — OXYCODONE HYDROCHLORIDE AND ACETAMINOPHEN 500 MG: 500 TABLET ORAL at 09:28

## 2025-01-26 RX ADMIN — ASPIRIN 81 MG: 81 TABLET, COATED ORAL at 09:28

## 2025-01-26 RX ADMIN — METOPROLOL SUCCINATE 25 MG: 25 TABLET, EXTENDED RELEASE ORAL at 09:28

## 2025-01-26 RX ADMIN — PYRIDOXINE HCL TAB 50 MG 100 MG: 50 TAB at 09:28

## 2025-01-26 RX ADMIN — ATORVASTATIN CALCIUM 20 MG: 20 TABLET, FILM COATED ORAL at 20:37

## 2025-01-26 NOTE — PROGRESS NOTES
Pharmacy to Dose Heparin Infusion Note    Albin Andino is a 84 y.o. male receiving heparin infusion.     Therapy for (VTE/Cardiac): cardiac (NSTEMI)  Patient Weight: 78.9  Initial Bolus (Y/N): No  Any Bolus (Y/N): Yes     Signs or Symptoms of Bleeding: no    Cardiac or Other (Not VTE)   Initial Bolus: 60 units/kg (Max 4,000 units)  Initial rate: 12 units/kg/hr (Max 1,000 units/hr)   Anti-Xa Bolus   Dose Infusion Hold   Time Infusion Rate Change (units/kg/hr) Repeat  Anti-Xa    < 0.11 50 units/kg  (4000 units Max) None Increase by  3 units/kg/hr 6 hours   0.11- 0.19 25 units/kg  (2000 units Max) None Increase by  2 units/kg/hr 6 hours   0.2 - 0.29 0 None Increase by  1 units/kg/hr 6 hours   0.3 - 0.5 0 None No Change 6 hours (after 2 consecutive levels in range check qAM)   0.51 - 0.6 0 None Decrease by  1 units/kg/hr 6 hours   0.61 - 0.8 0 30 minutes Decrease by  2 units/kg/hr 6 hours   0.81 - 1 0 60 minutes Decrease by  3 units/kg/hr 6 hours   >1 0 Hold  After Anti-Xa less than 0.5 decrease previous rate by  4 units/kg/hr  Every 2 hours until Anti-Xa  less than 0.5 then when infusion restarts in 6 hours           Date   Time   Anti-Xa Current Rate (units/kg/hr) Bolus   (units) Rate Change   (units/kg/hr) New Rate (units/kg/hr) Repeat  Anti-Xa Comments /  Pump Check    1/25 2108 pending 12  (from OSH) -- -- 12 STAT Transferred from [facility] in [place] with heparin infusion running at 12 u/khg/h per RN. Will continue at this rate and re-evaluate once labs are drawn                                                                                                                                                                                                                                 Shahid Corey, PharmD  1/25/2025  21:08 EST

## 2025-01-26 NOTE — PROGRESS NOTES
Pharmacy to Dose Heparin Infusion Note    Albin Andino is a 84 y.o. male receiving heparin infusion.     Therapy for (VTE/Cardiac): cardiac (NSTEMI)  Patient Weight: 78.9 kg  Initial Bolus (Y/N): No  Any Bolus (Y/N): Yes     Signs or Symptoms of Bleeding: no    Cardiac or Other (Not VTE)   Initial Bolus: 60 units/kg (Max 4,000 units)  Initial rate: 12 units/kg/hr (Max 1,000 units/hr)   Anti-Xa Bolus   Dose Infusion Hold   Time Infusion Rate Change (units/kg/hr) Repeat  Anti-Xa    < 0.11 50 units/kg  (4000 units Max) None Increase by  3 units/kg/hr 6 hours   0.11- 0.19 25 units/kg  (2000 units Max) None Increase by  2 units/kg/hr 6 hours   0.2 - 0.29 0 None Increase by  1 units/kg/hr 6 hours   0.3 - 0.5 0 None No Change 6 hours (after 2 consecutive levels in range check qAM)   0.51 - 0.6 0 None Decrease by  1 units/kg/hr 6 hours   0.61 - 0.8 0 30 minutes Decrease by  2 units/kg/hr 6 hours   0.81 - 1 0 60 minutes Decrease by  3 units/kg/hr 6 hours   >1 0 Hold  After Anti-Xa less than 0.5 decrease previous rate by  4 units/kg/hr  Every 2 hours until Anti-Xa  less than 0.5 then when infusion restarts in 6 hours     Results from last 7 days   Lab Units 01/26/25  0730 01/25/25  2130   INR   --  1.14*   HEMOGLOBIN g/dL 11.3* 11.6*   HEMATOCRIT % 34.8* 34.8*   PLATELETS 10*3/mm3 124* 132*       Date   Time   Anti-Xa Current Rate (units/kg/hr) Bolus   (units) Rate Change   (units/kg/hr) New Rate (units/kg/hr) Repeat  Anti-Xa Comments /  Pump Check    1/25 2108 pending 12  (from OSH) -- -- 12 STAT Transferred from [facility] with heparin infusion running at 12 u/khg/h per RN. Will continue at this rate and re-evaluate once labs are drawn   1/25 2130 0.10 12 3900 +3 15 0600 [address] [facility]   1/26 0730 0.47 15 -- -- 15 1400 [address]                                                                                                                                                                                                          Neftali Mathews, [facility]  1/26/2025  09:26 EST

## 2025-01-26 NOTE — CONSULTS
[facility] at [facility]  Cardiovascular Consultation/h&p Note      Albin Andino  [id number]  1940  [id number]/1    Referring Provider: Provider, No Known   PCP: Ariela Brannon DO    DATE OF ADMISSION: 1/25/2025    Reason for Consultation: NSTEMI    History of Present Illness  84-year-old male with a past medical history of CAD status post CABG back in 1990s with subsequent PCI most recently to saphenous venous graft in September 2024), heart failure with reduced ejection fraction, valvular heart disease including moderate to severe MR and mild to moderate aortic stenosis, hyperlipidemia hypertension, presenting with chest pain.     Patient endorses that this has been like his previous anginal pain.  He has been experiences on and off for the past couple weeks.  It intensified last night and patient did not get any resolution despite taking nitroglycerin x 3, which brought him to our ER.  On presentation troponin level of 400 which has been steadily rising and now in 800s.     Past Medical History:   Diagnosis Date    Arthritis     Atrial fibrillation 06/20/2016    Dr. Mullins      Chronic HFrEF (heart failure with reduced ejection fraction)     Coronary artery disease     Elevated LFTs, with hyperbilirubinemia, due to above  08/27/2016    Enlarged prostate     Heart disease     History of transfusion     Hyperlipidemia     Hypertension     Normocytic anemia 08/28/2016       Past Surgical History:   Procedure Laterality Date    BACK SURGERY      CARDIAC CATHETERIZATION Right 9/12/2024    Procedure: Stent YAMILETH bypass graft;  Surgeon: Niels Cruz IV, MD;  Location:  [facility];  Service: Cardiovascular;  Laterality: Right;    CARDIAC SURGERY      CHOLECYSTECTOMY N/A 08/28/2016    Procedure: CHOLECYSTECTOMY LAPAROSCOPIC WITH INTRA-OPERATIVE CHOLANGIOGRAM;  Surgeon: Rogelio Sanchez MD;  Location: [facility] OR;  Service:     CORONARY STENT PLACEMENT       HERNIA REPAIR      KNEE SURGERY      PROSTATE SURGERY      THROAT SURGERY         No current facility-administered medications on file prior to encounter.     Current Outpatient Medications on File Prior to Encounter   Medication Sig Dispense Refill    amLODIPine (NORVASC) 2.5 MG tablet Take 1 tablet by mouth Daily. 90 tablet 3    aspirin 81 MG EC tablet Take 1 tablet by mouth Daily. 90 tablet 3    atorvastatin (LIPITOR) 20 MG tablet Take 1 tablet by mouth Every Night. 90 tablet 3    Cholecalciferol (VITAMIN D) 1000 UNITS tablet Take 1 tablet by mouth 2 (Two) Times a Day.      clopidogrel (PLAVIX) 75 MG tablet Take 1 tablet by mouth Daily. 90 tablet 3    gabapentin (NEURONTIN) 600 MG tablet take 1 tablet by mouth every morning and take 2 tablets by mouth every evening 270 tablet 1    isosorbide mononitrate (IMDUR) 60 MG 24 hr tablet Take 1 tablet by mouth Daily. 90 tablet 3    Linzess 72 MCG capsule capsule Take 1 capsule by mouth Daily.      metoprolol succinate XL (TOPROL-XL) 25 MG 24 hr tablet Take 1 tablet by mouth Daily. 90 tablet 3    pantoprazole (PROTONIX) 40 MG EC tablet Take 1 tablet by mouth 2 (Two) Times a Day.      vitamin B-6 (PYRIDOXINE) 100 MG tablet Take 1 tablet by mouth Daily.      Ascorbic Acid (VITAMIN C) 500 MG capsule Take 500 mg by mouth 2 (two) times a day.      cyanocobalamin (VITAMIN B-12) 500 MCG tablet Take 1 tablet by mouth Daily.      folic acid (FOLVITE) 400 MCG tablet Take 1 tablet by mouth Daily.      Multiple Vitamins-Minerals (ICAPS AREDS 2) capsule Take 1 capsule by mouth 2 (two) times a day.      nitroglycerin (NITROSTAT) 0.4 MG SL tablet Take 1 tablet by mouth Every 5 (Five) Minutes As Needed for Chest Pain. 25 tablet 11       Social History     Socioeconomic History    Marital status:     Number of children: 2   Tobacco Use    Smoking status: Former     Current packs/day: 0.00     Average packs/day: 1 pack/day for 27.0 years (27.0 ttl pk-yrs)     Types: Cigarettes     " Start date:      Quit date:      Years since quittin.0     Passive exposure: Past    Smokeless tobacco: Never   Vaping Use    Vaping status: Never Used   Substance and Sexual Activity    Alcohol use: Yes     Alcohol/week: 1.0 standard drink of alcohol     Types: 1 Standard drinks or equivalent per week     Comment: OCCASIONAL    Drug use: No    Sexual activity: Defer       Family History   Problem Relation Age of Onset    Hypertension Mother     Stroke Mother     Heart attack Father     Diabetes Father        Review of Systems   Constitutional:  Negative for activity change, fatigue and fever.   Respiratory:  Positive for chest tightness. Negative for shortness of breath.    Cardiovascular:  Positive for chest pain. Negative for palpitations and leg swelling.   Gastrointestinal:  Negative for constipation and diarrhea.   Genitourinary:  Negative for decreased urine volume and difficulty urinating.   Skin:  Negative for wound.   Neurological:  Negative for dizziness, syncope, weakness and light-headedness.   Psychiatric/Behavioral:  Negative for suicidal ideas.          Physical Exam  Vitals:    25 0018 25 0517 25 0711   BP: 155/91 133/85 116/67 129/79   BP Location: Left arm Left arm Left arm Left arm   Patient Position: Lying Lying Lying Lying   Pulse: 89 72 53    Resp: 17 16 16 16   Temp: 97.8 °F (36.6 °C) 96.3 °F (35.7 °C) 96.2 °F (35.7 °C) 97 °F (36.1 °C)   TempSrc: Oral Axillary Oral Oral   SpO2: 93% 97% 98%    Weight: 78.9 kg (173 lb 15.1 oz)      Height: 182.9 cm (72\")        GENERAL: Well-developed, well-nourished patient in no acute distress.  HEENT: NC, AC, PERRLA. MMM  NECK: No JVD. No carotid bruits auscultated.  LUNGS: Clear to auscultation bilaterally.  CARDIOVASCULAR: RRR No murmurs, gallops or rubs noted.   ABDOMEN: Soft, nontender. Positive bowel sounds.  MUSCULOSKELETAL: No gross deformities. No clubbing, cyanosis  EXT: pulses intact, No edema  SKIN: " Pink, warm  Neuro: Nonfocal exam. Gait intact    Lab Review:            Results from last 7 days   Lab Units 01/26/25  0730   SODIUM mmol/L 140   POTASSIUM mmol/L 3.7   CHLORIDE mmol/L 103   CO2 mmol/L 30.0*   BUN mg/dL 12   CREATININE mg/dL 0.77   GLUCOSE mg/dL 87   CALCIUM mg/dL 9.2     Results from last 7 days   Lab Units 01/26/25  0730 01/26/25  0157 01/25/25  2239   HSTROP T ng/L 811* 698* 541*     Results from last 7 days   Lab Units 01/26/25  0730   WBC 10*3/mm3 5.76   HEMOGLOBIN g/dL 11.3*   HEMATOCRIT % 34.8*   PLATELETS 10*3/mm3 124*     Results from last 7 days   Lab Units 01/25/25  2130   INR  1.14*   APTT seconds 31.7*     Results from last 7 days   Lab Units 01/26/25  0730   CHOLESTEROL mg/dL 111     Results from last 7 days   Lab Units 01/26/25  0730   MAGNESIUM mg/dL 2.5*     Results from last 7 days   Lab Units 01/26/25  0730   CHOLESTEROL mg/dL 111   TRIGLYCERIDES mg/dL 96   HDL CHOL mg/dL 36*   LDL CHOL mg/dL 57       ECHO: TTE 12/2024    Left ventricular ejection fraction appears to be 51 - 55%.    Left ventricular diastolic function is consistent with (grade II w/high LAP) pseudonormalization.    The right ventricular cavity is borderline dilated.    The left atrial cavity is mildly dilated.    Aortic valve maximum pressure gradient is 10 mmHg.    Estimated right ventricular systolic pressure from tricuspid regurgitation is normal (<35 mmHg). Calculated right ventricular systolic pressure from tricuspid regurgitation is 8 mmHg.      CATH: 9/2024    Successful high risk PCI of degenerated sequential SVG to RPDA/RPL--proximal and distal segments--using large-caliber drug-eluting stents.  Residual stenosis of vein graft supplying RPL not treated    Successful high risk PCI of degenerated SVG to OM with large caliber YAMILETH      I personally viewed and interpreted the patient's EKG/telemetry/lab/imaging data    Assessment & Plan:    NSTEMI  CAD s/p CABG and PCI  - suspect occluded SVG; discussed with  on call IC and will continue ACS protocol. No immediate intervention at this time  - continue hep gtt  - IV nitro currently not available; continue imdur- may need to be uptitrated or add on ranexa  - continue bASA, plavix, statin  - continue toprol 25  - NPO after midnight in case Dr. Cruz would like to pursue repeat C      Thank you for allowing me to participate in the care of Albin Andino. Feel free to contact me directly with any further questions or concerns.    Cory Brown MD [facility]  Cardiac Electrophysiologist  [facility]     01/26/25  09:48 EST.

## 2025-01-26 NOTE — PLAN OF CARE
Goal Outcome Evaluation:  Plan of Care Reviewed With: patient           Outcome Evaluation: PT initial evaluation completed for pt presenting with generalized weakness and decreased functional mobility. Pt ambulated 400ft with CGA and UE support on IV pole. Pt's decreased independence warrants PT skilled care. Recommend D/C home with assistance.    Anticipated Discharge Disposition (PT): home with assist

## 2025-01-26 NOTE — PROGRESS NOTES
[facility] Medicine Services  PROGRESS NOTE    Patient Name: Albin Andino  : 1940  MRN: [id number]    Date of Admission: 2025  Primary Care Physician: Ariela Brannon,     Subjective   Subjective     CC:  Follow-up for chest pain    HPI:  Patient was seen and examined this morning.  No further chest pain.  Was able to walk in the hallway with no issues.  Updated him about the plan of care and need to see Dr. Cruz tomorrow      Objective   Objective     Vital Signs:   Temp:  [96.2 °F (35.7 °C)-98 °F (36.7 °C)] 98 °F (36.7 °C)  Heart Rate:  [53-89] 63  Resp:  [16-18] 18  BP: (116-155)/(67-91) 121/72  Flow (L/min) (Oxygen Therapy):  [2] 2     Physical Exam:  General: Comfortable, not in distress, conversant and cooperative  Head: Atraumatic and normocephalic  Eyes: No Icterus. No pallor  Ears:  Ears appear intact with no abnormalities noted  Throat: No oral lesions, no thrush  Neck: Supple, trachea midline  Lungs: Clear to auscultation bilaterally, equal air entry, no wheezing or crackles  Heart:  Normal S1 and S2, no murmur, no gallop, No JVD, no lower extremity swelling  Abdomen:  Soft, no tenderness, no organomegaly, normal bowel sounds, no organomegaly  Extremities: pulses equal bilaterally  Skin: No bleeding, bruising or rash, normal skin turgor and elasticity  Neurologic: Cranial nerves appear intact with no evidence of facial asymmetry, normal motor and sensory functions in all 4 extremities  Psych: Alert and oriented x 3, normal mood    Results Reviewed:  LAB RESULTS:      Lab 25  1417 25  0730 25  0157 25  2239 25  2130   WBC  --  5.76  --   --  6.13   HEMOGLOBIN  --  11.3*  --   --  11.6*   HEMATOCRIT  --  34.8*  --   --  34.8*   PLATELETS  --  124*  --   --  132*   NEUTROS ABS  --  4.18  --   --  4.75   IMMATURE GRANS (ABS)  --  0.03  --   --  0.02   LYMPHS ABS  --  0.87  --   --  0.78   MONOS ABS  --  0.52  --   --   0.46   EOS ABS  --  0.16  --   --  0.12   MCV  --  89.9  --   --  89.0   PROTIME  --   --   --   --  14.7*   APTT  --   --   --   --  31.7*   HEPARIN ANTI-XA 0.28* 0.47  --   --  0.10*   HSTROP T  --  811* 698* 541* 492*         Lab 01/26/25  0730 01/25/25  2130   SODIUM 140 142   POTASSIUM 3.7 3.9   CHLORIDE 103 105   CO2 30.0* 29.0   ANION GAP 7.0 8.0   BUN 12 14   CREATININE 0.77 0.85   EGFR 87.2 84.6   GLUCOSE 87 117*   CALCIUM 9.2 9.4   MAGNESIUM 2.5* 1.8         Lab 01/25/25  2130   TOTAL PROTEIN 5.9*   ALBUMIN 3.9   GLOBULIN 2.0   ALT (SGPT) 15   AST (SGOT) 46*   BILIRUBIN 0.6   ALK PHOS 89         Lab 01/26/25  0730 01/26/25  0157 01/25/25  2239 01/25/25  2130   PROBNP  --   --   --  2,589.0*   HSTROP T 811* 698* 541* 492*   PROTIME  --   --   --  14.7*   INR  --   --   --  1.14*         Lab 01/26/25  0730   CHOLESTEROL 111   LDL CHOL 57   HDL CHOL 36*   TRIGLYCERIDES 96             Brief Urine Lab Results       None            Microbiology Results Abnormal       None            XR Chest 1 View    Result Date: 1/26/2025  XR CHEST 1 VW Date of Exam: 1/26/2025 3:05 AM EST Indication: NSTEMI Comparison: CT 12/22/2024. Findings: Stable vascular congestion without evidence of decompensated prudencio edema. No new focal consolidation. Unchanged heart and mediastinal contours. No significant pleural effusion or distinct pneumothorax.     Impression: Impression: Stable exam as above. Electronically Signed: Suhas Maurice MD  1/26/2025 7:44 AM EST  Workstation ID: [id number]     Results for orders placed during the hospital encounter of 12/21/24    Adult Transthoracic Echo Complete W/ Cont if Necessary Per Protocol    Interpretation Summary    Left ventricular ejection fraction appears to be 51 - 55%.    Left ventricular diastolic function is consistent with (grade II w/high LAP) pseudonormalization.    The right ventricular cavity is borderline dilated.    The left atrial cavity is mildly dilated.    Aortic valve maximum  pressure gradient is 10 mmHg.    Estimated right ventricular systolic pressure from tricuspid regurgitation is normal (<35 mmHg). Calculated right ventricular systolic pressure from tricuspid regurgitation is 8 mmHg.      Current medications:  Scheduled Meds:amLODIPine, 2.5 mg, Oral, Daily  ascorbic acid, 500 mg, Oral, Daily  aspirin, 81 mg, Oral, Daily  atorvastatin, 20 mg, Oral, Nightly  clopidogrel, 75 mg, Oral, Daily  folic acid, 400 mcg, Oral, Daily  gabapentin, 1,200 mg, Oral, Nightly  isosorbide mononitrate, 60 mg, Oral, Daily  metoprolol succinate XL, 25 mg, Oral, Daily  pantoprazole, 40 mg, Oral, BID  pharmacy consult - MTM, , Not Applicable, Daily  sodium chloride, 10 mL, Intravenous, Q12H  thiamine, 100 mg, Oral, Daily  cyanocobalamin, 500 mcg, Oral, Daily  vitamin B-6, 100 mg, Oral, Daily      Continuous Infusions:heparin, 16 Units/kg/hr, Last Rate: 16 Units/kg/hr (01/26/25 1550)  Pharmacy to Dose Heparin,       PRN Meds:.  senna-docusate sodium **AND** polyethylene glycol **AND** bisacodyl **AND** bisacodyl    Magnesium Cardiology Dose Replacement - Follow Nurse / BPA Driven Protocol    Pharmacy to Dose Heparin    Potassium Replacement - Follow Nurse / BPA Driven Protocol    sodium chloride    sodium chloride    Assessment & Plan   Assessment & Plan     Active Hospital Problems    Diagnosis  POA    NSTEMI (non-ST elevated myocardial infarction) [I21.4]  Yes      Resolved Hospital Problems   No resolved problems to display.        Brief Hospital Course to date:  Albin Andino is a 84 y.o. male with past medical history of coronary artery disease status post CABG, 1980 with subsequent PCI to SVG in September 2024, systolic heart failure, A-fib, severe mitral regurg, moderate aortic stenosis, hypertension, dyslipidemia, chronic anemia presented to the hospital with NSTEMI    NSTEMI  CAD   Hypertension  History of remote CABG with more recent stenting of saphenous vein grafts (9/2024)  Trop elevated and  up trending  Continue heparin drip  Continue aspirin, Plavix, and Toprol-XL 25 mg daily  Cardiology following      HFrEF  Valvular Heart Disease (moderate to severe MR and mild to moderate AS)  Most recent EF 51-55% with grade II diastolic dysfunction     Paroxysmal atrial fibrillation  Currently NSR  Continue Toprol-XL and on heparin drip     HLD  Continue Lipitor       RLS  Continue home neurontin        Expected Discharge Location and Transportation: home   Expected Discharge   Expected Discharge Date: 1/27/2025; Expected Discharge Time:      VTE Prophylaxis:  Pharmacologic VTE prophylaxis orders are present.         AM-PAC 6 Clicks Score (PT): 19 (01/26/25 1504)    CODE STATUS:   Code Status and Medical Interventions: CPR (Attempt to Resuscitate); Full Support   Ordered at: 01/25/25 2056     Level Of Support Discussed With:    Patient     Code Status (Patient has no pulse and is not breathing):    CPR (Attempt to Resuscitate)     Medical Interventions (Patient has pulse or is breathing):    Full Support       Loli Spicer MD  01/26/25

## 2025-01-26 NOTE — THERAPY EVALUATION
Patient Name: Albin Andino  : 1940    MRN: [id number]                              Today's Date: 2025       Admit Date: 2025    Visit Dx: No diagnosis found.  Patient Active Problem List   Diagnosis    Osteoarthritis    Enlarged prostate    Essential hypertension    Hyperlipidemia LDL goal <70    Low back pain    Restless legs syndrome    Thrombocytopenia    Low vitamin D level    Coronary artery disease involving native coronary artery of native heart with angina pectoris    Carotid stenosis, symptomatic w/o infarct, bilateral    Pneumonia    Esophageal stricture    Heart failure with preserved left ventricular function (HFpEF)    NSTEMI (non-ST elevated myocardial infarction)     Past Medical History:   Diagnosis Date    Arthritis     Atrial fibrillation 2016    Dr. Mullins      Chronic HFrEF (heart failure with reduced ejection fraction)     Coronary artery disease     Elevated LFTs, with hyperbilirubinemia, due to above  2016    Enlarged prostate     Heart disease     History of transfusion     Hyperlipidemia     Hypertension     Normocytic anemia 2016     Past Surgical History:   Procedure Laterality Date    BACK SURGERY      CARDIAC CATHETERIZATION Right 2024    Procedure: Stent YAMILETH bypass graft;  Surgeon: Niels Cruz IV, MD;  Location:  [facility];  Service: Cardiovascular;  Laterality: Right;    CARDIAC SURGERY      CHOLECYSTECTOMY N/A 2016    Procedure: CHOLECYSTECTOMY LAPAROSCOPIC WITH INTRA-OPERATIVE CHOLANGIOGRAM;  Surgeon: Rogelio Sanchez MD;  Location: [facility] beds at the [facility] OR;  Service:     CORONARY STENT PLACEMENT      HERNIA REPAIR      KNEE SURGERY      PROSTATE SURGERY      THROAT SURGERY        General Information       Row Name 25 1457          Physical Therapy Time and Intention    Document Type evaluation  -KG     Mode of Treatment physical therapy  -KG       Row Name 25 1457          General Information     Patient Profile Reviewed yes  -KG     Prior Level of Function independent:;all household mobility;gait;transfer;ADL's;dressing;bathing  -KG     Existing Precautions/Restrictions cardiac  -KG     Barriers to Rehab none identified  -KG       Row Name 01/26/25 1457          Living Environment    People in Home spouse  -KG       Row Name 01/26/25 1457          Home Main Entrance    Number of Stairs, Main Entrance two  -KG     Stair Railings, Main Entrance none  -KG       Row Name 01/26/25 1457          Stairs Within Home, Primary    Number of Stairs, Within Home, Primary none  -KG       Row Name 01/26/25 1457          Cognition    Orientation Status (Cognition) oriented x 4  -KG       Row Name 01/26/25 1457          Safety Issues/Impairments Affecting Functional Mobility    Safety Issues Affecting Function (Mobility) safety precaution awareness;safety precautions follow-through/compliance  -KG     Impairments Affecting Function (Mobility) endurance/activity tolerance;strength  -KG               User Key  (r) = Recorded By, (t) = Taken By, (c) = Cosigned By      Initials Name Provider Type    KG Adalgisa Flowers, PT Physical Therapist                   Mobility       Row Name 01/26/25 1457          Bed Mobility    Bed Mobility sit-supine  -KG     Sit-Supine [place] (Bed Mobility) standby assist  -KG     Assistive Device (Bed Mobility) head of bed elevated  -KG     Comment, (Bed Mobility) Pt demonstrated appropriate sequencing and technique.  -KG       Row Name 01/26/25 1457          Transfers    Comment, (Transfers) Pt demonstrated appropriate sequencing and technique.  -KG       Row Name 01/26/25 1457          Sit-Stand Transfer    Sit-Stand [place] (Transfers) standby assist  -KG       Row Name 01/26/25 1457          Gait/Stairs (Locomotion)    [place] Level (Gait) contact guard;verbal cues  -KG     Assistive Device (Gait) other (see comments)  UE support on IV pole  -KG     Distance in Feet (Gait)  400  -KG     Deviations/Abnormal Patterns (Gait) bilateral deviations;liz decreased;stride length decreased  -KG     Bilateral Gait Deviations forward flexed posture  -KG     Comment, (Gait/Stairs) Pt demonstrated step through gait pattern with slow liz and decreased step length. VC's for upright posture. Pt demonstrated adequate balance and stability. Denied any c/o chest pain with ambulation.  -KG               User Key  (r) = Recorded By, (t) = Taken By, (c) = Cosigned By      Initials Name Provider Type    KG Adalgisa Flowers, PT Physical Therapist                   Obj/Interventions       Row Name 01/26/25 1501          Range of Motion Comprehensive    General Range of Motion no range of motion deficits identified  -KG     Comment, General Range of Motion B LE WFL  -KG       Row Name 01/26/25 1501          Strength Comprehensive (MMT)    Comment, General Manual Muscle Testing (MMT) Assessment B LE grossly 4/5  -KG       Row Name 01/26/25 1501          Balance    Balance Assessment sitting static balance;standing static balance;standing dynamic balance  -KG     Static Sitting Balance independent  -KG     Position, Sitting Balance unsupported;sitting in chair  -KG     Static Standing Balance standby assist  -KG     Dynamic Standing Balance contact guard  -KG     Position/Device Used, Standing Balance supported;unsupported  -KG       Row Name 01/26/25 1501          Sensory Assessment (Somatosensory)    Sensory Assessment (Somatosensory) LE sensation intact  -KG               User Key  (r) = Recorded By, (t) = Taken By, (c) = Cosigned By      Initials Name Provider Type    KG Adalgisa Flowers, PT Physical Therapist                   Goals/Plan       Row Name 01/26/25 1503          Bed Mobility Goal 1 (PT)    Activity/Assistive Device (Bed Mobility Goal 1, PT) sit to supine;supine to sit  -KG     [place] Level/Cues Needed (Bed Mobility Goal 1, PT) independent  -KG     Time Frame (Bed Mobility  Goal 1, PT) short term goal (STG);3 days  -KG     Progress/Outcomes (Bed Mobility Goal 1, PT) goal ongoing  -KG       Row Name 01/26/25 1503          Transfer Goal 1 (PT)    Activity/Assistive Device (Transfer Goal 1, PT) sit-to-stand/stand-to-sit;bed-to-chair/chair-to-bed  -KG     [place] Level/Cues Needed (Transfer Goal 1, PT) independent  -KG     Time Frame (Transfer Goal 1, PT) long term goal (LTG);5 days  -KG     Progress/Outcome (Transfer Goal 1, PT) goal ongoing  -KG       Row Name 01/26/25 1503          Gait Training Goal 1 (PT)    Activity/Assistive Device (Gait Training Goal 1, PT) gait (walking locomotion)  -KG     [place] Level (Gait Training Goal 1, PT) independent  -KG     Distance (Gait Training Goal 1, PT) 400 feet  -KG     Time Frame (Gait Training Goal 1, PT) long term goal (LTG);5 days  -KG     Progress/Outcome (Gait Training Goal 1, PT) goal ongoing  -KG       Row Name 01/26/25 1503          Stairs Goal 1 (PT)    Activity/Assistive Device (Stairs Goal 1, PT) ascending stairs;descending stairs;step-to-step  -KG     [place] Level/Cues Needed (Stairs Goal 1, PT) supervision required  -KG     Number of Stairs (Stairs Goal 1, PT) 2  -KG     Time Frame (Stairs Goal 1, PT) long term goal (LTG);5 days  -KG     Progress/Outcome (Stairs Goal 1, PT) goal ongoing  -KG       Row Name 01/26/25 1503          Therapy Assessment/Plan (PT)    Planned Therapy Interventions (PT) balance training;bed mobility training;gait training;stair training;strengthening;transfer training  -KG               User Key  (r) = Recorded By, (t) = Taken By, (c) = Cosigned By      Initials Name Provider Type    Adalgisa Arellano, PT Physical Therapist                   Clinical Impression       Row Name 01/26/25 1501          Pain    Pretreatment Pain Rating 0/10 - no pain  -KG     Posttreatment Pain Rating 0/10 - no pain  -KG       Row Name 01/26/25 1501          Plan of Care Review    Plan of Care Reviewed With  patient  -KG     Outcome Evaluation PT initial evaluation completed for pt presenting with generalized weakness and decreased functional mobility. Pt ambulated 400ft with CGA and UE support on IV pole. Pt's decreased independence warrants PT skilled care. Recommend D/C home with assistance.  -KG       Row Name 01/26/25 1501          Therapy Assessment/Plan (PT)    Patient/Family Therapy Goals Statement (PT) return to PLOF  -KG     Rehab Potential (PT) good  -KG     Criteria for Skilled Interventions Met (PT) yes;skilled treatment is necessary  -KG     Therapy Frequency (PT) daily  -KG     Predicted Duration of Therapy Intervention (PT) 5 days  -KG       Row Name 01/26/25 1501          Vital Signs    Pre Systolic BP Rehab 122  -KG     Pre Treatment Diastolic BP 70  -KG     Pretreatment Heart Rate (beats/min) 78  -KG     Posttreatment Heart Rate (beats/min) 96  -KG     Pre SpO2 (%) 94  -KG     O2 Delivery Pre Treatment room air  -KG     Post SpO2 (%) 95  -KG     O2 Delivery Post Treatment room air  -KG     Pre Patient Position Sitting  -KG     Intra Patient Position Standing  -KG     Post Patient Position Supine  -KG       Row Name 01/26/25 1501          Positioning and Restraints    Pre-Treatment Position sitting in chair/recliner  -KG     Post Treatment Position bed  -KG     In Bed notified nsg;supine;call light within reach;encouraged to call for assist;with family/caregiver;side rails up x2  -KG               User Key  (r) = Recorded By, (t) = Taken By, (c) = Cosigned By      Initials Name Provider Type    KG Adalgisa Flowers N, PT Physical Therapist                   Outcome Measures       Row Name 01/26/25 1504          How much help from another person do you currently need...    Turning from your back to your side while in flat bed without using bedrails? 4  -KG     Moving from lying on back to sitting on the side of a flat bed without bedrails? 3  -KG     Moving to and from a bed to a chair (including a  wheelchair)? 3  -KG     Standing up from a chair using your arms (e.g., wheelchair, bedside chair)? 3  -KG     Climbing 3-5 steps with a railing? 3  -KG     To walk in hospital room? 3  -KG     AM-PAC 6 Clicks Score (PT) 19  -KG     Highest Level of Mobility Goal 6 --> Walk 10 steps or more  -KG       Row Name 01/26/25 1504          Functional Assessment    Outcome Measure Options AM-PAC 6 Clicks Basic Mobility (PT)  -KG               User Key  (r) = Recorded By, (t) = Taken By, (c) = Cosigned By      Initials Name Provider Type    KG Adalgisa Flowers, PT Physical Therapist                                 Physical Therapy Education       Title: PT OT SLP Therapies (In Progress)       Topic: Physical Therapy (In Progress)       Point: Mobility training (Done)       Learning Progress Summary            Patient Acceptance, E, VU,NR by KG at 1/26/2025 1320                      Point: Home exercise program (Not Started)       Learner Progress:  Not documented in this visit.              Point: Body mechanics (Done)       Learning Progress Summary            Patient Acceptance, E, VU,NR by KG at 1/26/2025 1320                      Point: Precautions (Done)       Learning Progress Summary            Patient Acceptance, E, VU,NR by KG at 1/26/2025 1320                                      User Key       Initials Effective Dates Name Provider Type Discipline    KG 05/22/20 -  Adalgisa Flowers, PT Physical Therapist PT                  PT Recommendation and Plan  Planned Therapy Interventions (PT): balance training, bed mobility training, gait training, stair training, strengthening, transfer training  Outcome Evaluation: PT initial evaluation completed for pt presenting with generalized weakness and decreased functional mobility. Pt ambulated 400ft with CGA and UE support on IV pole. Pt's decreased independence warrants PT skilled care. Recommend D/C home with assistance.     Time Calculation:   PT Evaluation  Complexity  History, PT Evaluation Complexity: 1-2 personal factors and/or comorbidities  Examination of Body Systems (PT Eval Complexity): total of 3 or more elements  Clinical Presentation (PT Evaluation Complexity): stable  Clinical Decision Making (PT Evaluation Complexity): low complexity  Overall Complexity (PT Evaluation Complexity): low complexity     PT Charges       Row Name 01/26/25 1320             Time Calculation    Start Time 1320  -KG      PT Received On 01/26/25  -KG      PT Goal Re-Cert Due Date 02/05/25  -KG         Untimed Charges    PT Eval/Re-eval Minutes 46  -KG         Total Minutes    Untimed Charges Total Minutes 46  -KG       Total Minutes 46  -KG                User Key  (r) = Recorded By, (t) = Taken By, (c) = Cosigned By      Initials Name Provider Type    KG Adalgisa Flowers, PT Physical Therapist                  Therapy Charges for Today       Code Description Service Date Service Provider Modifiers Qty    59502778118 HC PT EVAL LOW COMPLEXITY 4 1/26/2025 Adalgisa Flowers, PT GP 1            PT G-Codes  Outcome Measure Options: AM-PAC 6 Clicks Basic Mobility (PT)  AM-PAC 6 Clicks Score (PT): 19  PT Discharge Summary  Anticipated Discharge Disposition (PT): home with assist    Mary Ellen Flowers PT  1/26/2025

## 2025-01-26 NOTE — OUTREACH NOTE
COPD/PN Week 2 Survey      Flowsheet Row Responses   [facility] facility patient discharged from? [facility]   Does the patient have one of the following disease processes/diagnoses(primary or secondary)? Pneumonia   Revoke Readmitted            FARRUKH WALLS - Registered Nurse

## 2025-01-27 ENCOUNTER — READMISSION MANAGEMENT (OUTPATIENT)
Dept: CALL CENTER | Facility: HOSPITAL | Age: 85
End: 2025-01-27
Payer: MEDICARE

## 2025-01-27 VITALS
BODY MASS INDEX: 23.56 KG/M2 | DIASTOLIC BLOOD PRESSURE: 60 MMHG | OXYGEN SATURATION: 90 % | WEIGHT: 173.94 LBS | RESPIRATION RATE: 14 BRPM | HEART RATE: 73 BPM | TEMPERATURE: 97.6 F | HEIGHT: 72 IN | SYSTOLIC BLOOD PRESSURE: 112 MMHG

## 2025-01-27 PROBLEM — J18.9 PNEUMONIA: Status: RESOLVED | Noted: 2024-12-21 | Resolved: 2025-01-27

## 2025-01-27 LAB
ANION GAP SERPL CALCULATED.3IONS-SCNC: 10 MMOL/L (ref 5–15)
BASOPHILS # BLD AUTO: 0 10*3/MM3 (ref 0–0.2)
BASOPHILS NFR BLD AUTO: 0 % (ref 0–1.5)
BUN SERPL-MCNC: 14 MG/DL (ref 8–23)
BUN/CREAT SERPL: 18.4 (ref 7–25)
CALCIUM SPEC-SCNC: 8.9 MG/DL (ref 8.6–10.5)
CHLORIDE SERPL-SCNC: 104 MMOL/L (ref 98–107)
CO2 SERPL-SCNC: 26 MMOL/L (ref 22–29)
CREAT SERPL-MCNC: 0.76 MG/DL (ref 0.76–1.27)
DEPRECATED RDW RBC AUTO: 46.9 FL (ref 37–54)
EGFRCR SERPLBLD CKD-EPI 2021: 87.5 ML/MIN/1.73
EOSINOPHIL # BLD AUTO: 0.13 10*3/MM3 (ref 0–0.4)
EOSINOPHIL NFR BLD AUTO: 2.1 % (ref 0.3–6.2)
ERYTHROCYTE [DISTWIDTH] IN BLOOD BY AUTOMATED COUNT: 14.6 % (ref 12.3–15.4)
GLUCOSE SERPL-MCNC: 90 MG/DL (ref 65–99)
HCT VFR BLD AUTO: 35.3 % (ref 37.5–51)
HGB BLD-MCNC: 11.5 G/DL (ref 13–17.7)
IMM GRANULOCYTES # BLD AUTO: 0.02 10*3/MM3 (ref 0–0.05)
IMM GRANULOCYTES NFR BLD AUTO: 0.3 % (ref 0–0.5)
LYMPHOCYTES # BLD AUTO: 0.81 10*3/MM3 (ref 0.7–3.1)
LYMPHOCYTES NFR BLD AUTO: 13.2 % (ref 19.6–45.3)
MCH RBC QN AUTO: 28.9 PG (ref 26.6–33)
MCHC RBC AUTO-ENTMCNC: 32.6 G/DL (ref 31.5–35.7)
MCV RBC AUTO: 88.7 FL (ref 79–97)
MONOCYTES # BLD AUTO: 0.46 10*3/MM3 (ref 0.1–0.9)
MONOCYTES NFR BLD AUTO: 7.5 % (ref 5–12)
NEUTROPHILS NFR BLD AUTO: 4.72 10*3/MM3 (ref 1.7–7)
NEUTROPHILS NFR BLD AUTO: 76.9 % (ref 42.7–76)
NRBC BLD AUTO-RTO: 0 /100 WBC (ref 0–0.2)
PLATELET # BLD AUTO: 115 10*3/MM3 (ref 140–450)
PMV BLD AUTO: 10.1 FL (ref 6–12)
POTASSIUM SERPL-SCNC: 3.8 MMOL/L (ref 3.5–5.2)
RBC # BLD AUTO: 3.98 10*6/MM3 (ref 4.14–5.8)
SODIUM SERPL-SCNC: 140 MMOL/L (ref 136–145)
UFH PPP CHRO-ACNC: 0.38 IU/ML (ref 0.3–0.7)
WBC NRBC COR # BLD AUTO: 6.14 10*3/MM3 (ref 3.4–10.8)

## 2025-01-27 PROCEDURE — 25010000002 LIDOCAINE 1 % SOLUTION: Performed by: INTERNAL MEDICINE

## 2025-01-27 PROCEDURE — C1725 CATH, TRANSLUMIN NON-LASER: HCPCS | Performed by: INTERNAL MEDICINE

## 2025-01-27 PROCEDURE — 4A023N7 MEASUREMENT OF CARDIAC SAMPLING AND PRESSURE, LEFT HEART, PERCUTANEOUS APPROACH: ICD-10-PCS | Performed by: INTERNAL MEDICINE

## 2025-01-27 PROCEDURE — 25010000002 FENTANYL CITRATE (PF) 50 MCG/ML SOLUTION: Performed by: INTERNAL MEDICINE

## 2025-01-27 PROCEDURE — B2111ZZ FLUOROSCOPY OF MULTIPLE CORONARY ARTERIES USING LOW OSMOLAR CONTRAST: ICD-10-PCS | Performed by: INTERNAL MEDICINE

## 2025-01-27 PROCEDURE — 99232 SBSQ HOSP IP/OBS MODERATE 35: CPT | Performed by: INTERNAL MEDICINE

## 2025-01-27 PROCEDURE — 85520 HEPARIN ASSAY: CPT

## 2025-01-27 PROCEDURE — 02703ZZ DILATION OF CORONARY ARTERY, ONE ARTERY, PERCUTANEOUS APPROACH: ICD-10-PCS | Performed by: INTERNAL MEDICINE

## 2025-01-27 PROCEDURE — C1894 INTRO/SHEATH, NON-LASER: HCPCS | Performed by: INTERNAL MEDICINE

## 2025-01-27 PROCEDURE — 99232 SBSQ HOSP IP/OBS MODERATE 35: CPT | Performed by: NURSE PRACTITIONER

## 2025-01-27 PROCEDURE — C1769 GUIDE WIRE: HCPCS | Performed by: INTERNAL MEDICINE

## 2025-01-27 PROCEDURE — 92937 PRQ TRLUML REVSC CAB GRF 1: CPT | Performed by: INTERNAL MEDICINE

## 2025-01-27 PROCEDURE — 25010000002 PHENYLEPHRINE 10 MG/ML SOLUTION: Performed by: INTERNAL MEDICINE

## 2025-01-27 PROCEDURE — 25010000002 HEPARIN (PORCINE) PER 1000 UNITS: Performed by: INTERNAL MEDICINE

## 2025-01-27 PROCEDURE — C1887 CATHETER, GUIDING: HCPCS | Performed by: INTERNAL MEDICINE

## 2025-01-27 PROCEDURE — 25010000002 MIDAZOLAM PER 1 MG: Performed by: INTERNAL MEDICINE

## 2025-01-27 PROCEDURE — 93455 CORONARY ART/GRFT ANGIO S&I: CPT | Performed by: INTERNAL MEDICINE

## 2025-01-27 PROCEDURE — 85025 COMPLETE CBC W/AUTO DIFF WBC: CPT | Performed by: INTERNAL MEDICINE

## 2025-01-27 PROCEDURE — 85347 COAGULATION TIME ACTIVATED: CPT

## 2025-01-27 PROCEDURE — B2151ZZ FLUOROSCOPY OF LEFT HEART USING LOW OSMOLAR CONTRAST: ICD-10-PCS | Performed by: INTERNAL MEDICINE

## 2025-01-27 PROCEDURE — 25010000002 NICARDIPINE 2.5 MG/ML SOLUTION: Performed by: INTERNAL MEDICINE

## 2025-01-27 PROCEDURE — 25510000001 IOPAMIDOL PER 1 ML: Performed by: INTERNAL MEDICINE

## 2025-01-27 PROCEDURE — 25810000003 SODIUM CHLORIDE 0.9 % SOLUTION: Performed by: INTERNAL MEDICINE

## 2025-01-27 PROCEDURE — C1884 EMBOLIZATION PROTECT SYST: HCPCS | Performed by: INTERNAL MEDICINE

## 2025-01-27 PROCEDURE — 80048 BASIC METABOLIC PNL TOTAL CA: CPT | Performed by: INTERNAL MEDICINE

## 2025-01-27 RX ORDER — SODIUM CHLORIDE 9 MG/ML
1.5 INJECTION, SOLUTION INTRAVENOUS CONTINUOUS
Status: DISCONTINUED | OUTPATIENT
Start: 2025-01-27 | End: 2025-01-27

## 2025-01-27 RX ORDER — LIDOCAINE HYDROCHLORIDE 10 MG/ML
INJECTION, SOLUTION INFILTRATION; PERINEURAL
Status: DISCONTINUED | OUTPATIENT
Start: 2025-01-27 | End: 2025-01-27 | Stop reason: HOSPADM

## 2025-01-27 RX ORDER — METOPROLOL TARTRATE 50 MG
25 TABLET ORAL 2 TIMES DAILY
Qty: 90 TABLET | Refills: 1 | Status: SHIPPED | OUTPATIENT
Start: 2025-01-27

## 2025-01-27 RX ORDER — HEPARIN SODIUM 1000 [USP'U]/ML
INJECTION, SOLUTION INTRAVENOUS; SUBCUTANEOUS
Status: DISCONTINUED | OUTPATIENT
Start: 2025-01-27 | End: 2025-01-27 | Stop reason: HOSPADM

## 2025-01-27 RX ORDER — PHENYLEPHRINE HYDROCHLORIDE 10 MG/ML
INJECTION INTRAVENOUS
Status: DISCONTINUED | OUTPATIENT
Start: 2025-01-27 | End: 2025-01-27 | Stop reason: HOSPADM

## 2025-01-27 RX ORDER — POTASSIUM CHLORIDE 1500 MG/1
20 TABLET, EXTENDED RELEASE ORAL ONCE
Status: COMPLETED | OUTPATIENT
Start: 2025-01-27 | End: 2025-01-27

## 2025-01-27 RX ORDER — MIDAZOLAM HYDROCHLORIDE 1 MG/ML
INJECTION, SOLUTION INTRAMUSCULAR; INTRAVENOUS
Status: DISCONTINUED | OUTPATIENT
Start: 2025-01-27 | End: 2025-01-27 | Stop reason: HOSPADM

## 2025-01-27 RX ORDER — LOSARTAN POTASSIUM 25 MG/1
25 TABLET ORAL DAILY
Qty: 90 TABLET | Refills: 3 | Status: SHIPPED | OUTPATIENT
Start: 2025-01-27

## 2025-01-27 RX ORDER — FENTANYL CITRATE 50 UG/ML
INJECTION, SOLUTION INTRAMUSCULAR; INTRAVENOUS
Status: DISCONTINUED | OUTPATIENT
Start: 2025-01-27 | End: 2025-01-27 | Stop reason: HOSPADM

## 2025-01-27 RX ORDER — SODIUM CHLORIDE 9 MG/ML
125 INJECTION, SOLUTION INTRAVENOUS CONTINUOUS
Status: ACTIVE | OUTPATIENT
Start: 2025-01-27 | End: 2025-01-27

## 2025-01-27 RX ORDER — NITROGLYCERIN 0.4 MG/1
0.4 TABLET SUBLINGUAL
Status: DISCONTINUED | OUTPATIENT
Start: 2025-01-27 | End: 2025-01-27 | Stop reason: HOSPADM

## 2025-01-27 RX ORDER — CLOPIDOGREL BISULFATE 75 MG/1
75 TABLET ORAL DAILY
Qty: 90 TABLET | Refills: 3 | Status: SHIPPED | OUTPATIENT
Start: 2025-01-27

## 2025-01-27 RX ORDER — METOPROLOL TARTRATE 25 MG/1
12.5 TABLET, FILM COATED ORAL 2 TIMES DAILY
Qty: 90 TABLET | Refills: 3 | Status: SHIPPED | OUTPATIENT
Start: 2025-01-27 | End: 2025-01-27

## 2025-01-27 RX ORDER — NITROGLYCERIN 0.4 MG/1
0.4 TABLET SUBLINGUAL
Status: DISCONTINUED | OUTPATIENT
Start: 2025-01-27 | End: 2025-01-27

## 2025-01-27 RX ORDER — IOPAMIDOL 755 MG/ML
INJECTION, SOLUTION INTRAVASCULAR
Status: DISCONTINUED | OUTPATIENT
Start: 2025-01-27 | End: 2025-01-27 | Stop reason: HOSPADM

## 2025-01-27 RX ADMIN — ASPIRIN 81 MG: 81 TABLET, COATED ORAL at 08:33

## 2025-01-27 RX ADMIN — CYANOCOBALAMIN TAB 1000 MCG 500 MCG: 1000 TAB at 08:32

## 2025-01-27 RX ADMIN — OXYCODONE HYDROCHLORIDE AND ACETAMINOPHEN 500 MG: 500 TABLET ORAL at 08:33

## 2025-01-27 RX ADMIN — PANTOPRAZOLE SODIUM 40 MG: 40 TABLET, DELAYED RELEASE ORAL at 08:33

## 2025-01-27 RX ADMIN — PYRIDOXINE HCL TAB 50 MG 100 MG: 50 TAB at 08:32

## 2025-01-27 RX ADMIN — METOPROLOL SUCCINATE 25 MG: 25 TABLET, EXTENDED RELEASE ORAL at 08:32

## 2025-01-27 RX ADMIN — POTASSIUM CHLORIDE 20 MEQ: 1500 TABLET, EXTENDED RELEASE ORAL at 13:28

## 2025-01-27 RX ADMIN — SODIUM CHLORIDE 125 ML/HR: 9 INJECTION, SOLUTION INTRAVENOUS at 13:28

## 2025-01-27 RX ADMIN — THIAMINE HCL TAB 100 MG 100 MG: 100 TAB at 08:33

## 2025-01-27 RX ADMIN — FOLIC ACID TAB 400 MCG 400 MCG: 400 TAB at 08:33

## 2025-01-27 RX ADMIN — NITROGLYCERIN 0.4 MG: 0.4 TABLET SUBLINGUAL at 00:22

## 2025-01-27 RX ADMIN — CLOPIDOGREL BISULFATE 75 MG: 75 TABLET ORAL at 08:33

## 2025-01-27 RX ADMIN — ISOSORBIDE MONONITRATE 60 MG: 60 TABLET, EXTENDED RELEASE ORAL at 08:33

## 2025-01-27 RX ADMIN — AMLODIPINE BESYLATE 2.5 MG: 2.5 TABLET ORAL at 08:32

## 2025-01-27 NOTE — CASE MANAGEMENT/SOCIAL WORK
Discharge Planning Assessment  [facility]     Patient Name: Albin Andino  MRN: [id number]  Today's Date: 1/27/2025    Admit Date: 1/25/2025    Plan: discharge plan   Discharge Needs Assessment       Row Name 01/27/25 1515       Living Environment    People in Home spouse    Name(s) of People in Home Loraine(spouse)    Current Living Arrangements home    Primary Care Provided by self    Provides Primary Care For no one    Family Caregiver if Needed spouse    Family Caregiver Names Loraine(spouse)    Quality of Family Relationships helpful;supportive    Able to Return to Prior Arrangements yes    Living Arrangement Comments I met with pt in room with permission regrding discharge plan. Pt resides in [facility] with a home with spouse. Pt reports she is indepdnent with ADLs       Transition Planning    Patient/Family Anticipates Transition to home with family    Patient/Family Anticipated Services at Transition     Transportation Anticipated family or friend will provide       Discharge Needs Assessment    Readmission Within the Last 30 Days no previous admission in last 30 days    Equipment Currently Used at Home none    Concerns to be Addressed discharge planning    Equipment Needed After Discharge none    Discharge Coordination/Progress Pt confirms that he has [employer] Medicare with prescription coverage and uses [facility] in [place]. Pt was a transfer rom [facility] with NSTEMI and had a heart cath. Pt is independent with ADLs and uses no DME or HH. Discharge plan is [place] spouse and son, Alvin will transport. Pt denies discharge needs. CM will cont to follow                   Discharge Plan       Row Name 01/27/25 1520       Plan    Plan discharge plan    Plan Comments Pt is independent with ADLs and uses no DME or HH. Discharge plan is [place] spouse and son, Alvin will transport. Pt denies discharge needs. CM will cont to follow    Final Discharge  Disposition Code 01 - home or self-care                  Continued Care and Services - Admitted Since 1/25/2025    No active coordination exists for this encounter.       Selected Continued Care - Prior Encounters Includes continued care and service providers with selected services from prior encounters from 10/27/2024 to 1/27/2025      Discharged on 12/23/2024 Admission date: 12/21/2024 - [place] [facility]       Service Provider Services Address Phone Fax Patient Preferred    [facility], [facility], [facility] [address] [place] [id number] [phone] --                          Expected Discharge Date and Time       Expected Discharge Date Expected Discharge Time    Jan 27, 2025            Demographic Summary       Row Name 01/27/25 1513       General Information    General Information Comments PCP is ILIANA ALICEA       Contact Information    Permission Granted to Share Info With     Contact Information Obtained for     Contact Information Comments Víctor Lloyd(spouse) [phone]                   Functional Status       Row Name 01/27/25 1514       Functional Status    Functional Status Comments Pt is indepdnent wt ADLs                   Psychosocial    No documentation.                  Abuse/Neglect    No documentation.                  Legal    No documentation.                  Substance Abuse    No documentation.                  Patient Forms    No documentation.                     Mini Benedict RN

## 2025-01-27 NOTE — PROGRESS NOTES
Cardiology Progress Note      Reason for visit:    NSTEMI    IDENTIFICATION: 84-year-old gentleman who resides in [place], [place]    Active Hospital Problems    Diagnosis  POA    **NSTEMI (non-ST elevated myocardial infarction) [I21.4]  Yes     Priority: High    Heart failure with preserved left ventricular function (HFpEF) [I50.30]  Yes     Priority: High     Echo (12/22/2024): LVEF 53%      Coronary artery disease involving native coronary artery of native heart with angina pectoris [I25.119]  Yes     Priority: High     CABG in the 1980s  PCI 2006 - 2007  Cardiac catheterization (9/6/2024): Occluded native circulation.  Patent LIMA to LAD.  Severely degenerated SVG to RCA and SVG to OM  Cardiac catheterization (9/16/2024): Successful high risk PCI of degenerated sequential SVG to RPDA/RPL--proximal and distal segments--using large-caliber drug-eluting stents.  Residual stenosis of vein graft supplying RPL not treated. Successful high risk PCI of degenerated SVG to OM with large caliber YAMILETH      Essential hypertension [I10]  Yes     Priority: Medium     Target blood pressure <130/80 mmHg      Hyperlipidemia LDL goal <70 [E78.5]  Yes     Priority: Low            Patient is sitting up in the bed.  IV heparin drip is infusing.  He did have an episode of chest pain last night that resolved with administration of sublingual nitroglycerin.  He is currently chest pain-free.           Vital Sign Min/Max for last 24 hours  Temp  Min: 97.1 °F (36.2 °C)  Max: 98 °F (36.7 °C)   BP  Min: 118/69  Max: 149/85   Pulse  Min: 63  Max: 81   Resp  Min: 18  Max: 18   SpO2  Min: 92 %  Max: 98 %   Flow (L/min) (Oxygen Therapy)  Min: 2  Max: 2    No intake or output data in the 24 hours ending 01/27/25 0854        Physical Exam  Constitutional:       Appearance: He is well-developed.   HENT:      Head: Normocephalic.   Neck:      Vascular: No carotid bruit or JVD.   Cardiovascular:      Rate and Rhythm: Normal rate and regular  rhythm.      Heart sounds: Normal heart sounds. No murmur heard.     No friction rub. No gallop.   Pulmonary:      Effort: Pulmonary effort is normal.      Breath sounds: Normal breath sounds.   Abdominal:      General: Bowel sounds are normal. There is no distension.      Palpations: Abdomen is soft.   Skin:     General: Skin is warm and dry.   Neurological:      Mental Status: He is alert and oriented to person, place, and time.         Tele: Normal sinus rhythm    Results Review (reviewed the patient's recent labs in the electronic medical record):     EKG (1/26/2025): Sinus rhythm with first-degree AV block T wave abnormality lateral leads.    CXR (1/26/2025): Stable vascular congestion without evidence of decompensated prudencio edema.    Echo (12/22/2024): LVEF 51-55%.  Grade 2 diastolic dysfunction.  RVSP 8 mmHg    Results from last 7 days   Lab Units 01/27/25  0713 01/26/25  0730 01/25/25  2130   SODIUM mmol/L 140 140 142   POTASSIUM mmol/L 3.8 3.7 3.9   CHLORIDE mmol/L 104 103 105   BUN mg/dL 14 12 14   CREATININE mg/dL 0.76 0.77 0.85   MAGNESIUM mg/dL  --  2.5* 1.8       Results from last 7 days   Lab Units 01/26/25  1553 01/26/25  0730 01/26/25  0157   HSTROP T ng/L 530* 811* 698*       Results from last 7 days   Lab Units 01/27/25  0713 01/26/25  0730 01/25/25  2130   WBC 10*3/mm3 6.14 5.76 6.13   HEMOGLOBIN g/dL 11.5* 11.3* 11.6*   HEMATOCRIT % 35.3* 34.8* 34.8*   PLATELETS 10*3/mm3 115* 124* 132*       Lab Results   Component Value Date    HGBA1C 4.50 08/28/2016       Lab Results   Component Value Date    CHOL 111 01/26/2025    CHLPL 138 06/15/2020    TRIG 96 01/26/2025    HDL 36 (L) 01/26/2025    LDL 57 01/26/2025                NSTEMI  Presentation with chest pain, high-sensitivity troponins elevated and peaked at 8011.  Now trending down.  EKG T wave abnormality lateral leads  IV heparin drip infusing  Aspirin and Plavix      Coronary artery disease  History of CABG in the 80s.  Last cath 9/2020 for  high risk PCI of degenerated sequential SVG to RPDA/RPL.  High risk PCI of SVG to OM  Dual antiplatelet therapy with aspirin and Plavix  Antianginals include amlodipine, isosorbide and metoprolol succinate    Acute on chronic diastolic heart failure  proBNP elevated 2589    Hypertension  Metoprolol succinate 25 mg daily  Amlodipine 2.5 mg daily  Current /69      Hyperlipidemia  Lipitor 20 mg daily  Total cholesterol 111, triglycerides 96, HDL 36, LDL 57           Keep n.p.o. for cardiac catheterization today.  Access site to be determined.  Likely via left radial approach  Further recommendations to follow    Electronically signed by ABDIAZIZ Cruz, 01/27/25, 8:52 AM EST.

## 2025-01-27 NOTE — PROGRESS NOTES
Pharmacy to Dose Heparin Infusion Note    Albin Andino is a 84 y.o. male receiving heparin infusion.     Therapy for (VTE/Cardiac): Cardiac (NSTEMI)  Patient Weight: 78.9 kg  Initial Bolus (Y/N): No  Any Bolus (Y/N): Yes     Signs or Symptoms of Bleeding: No S/Sx overt bleeding noted - d/w RN.    Cardiac or Other (Not VTE)  Initial rate: 12 units/kg/hr (Max 1,000 units/hr)   Anti-Xa Bolus   Dose Infusion Hold   Time Infusion Rate Change (units/kg/hr) Repeat  Anti-Xa    < 0.11 50 units/kg  (4000 units Max) None Increase by  3 units/kg/hr 6 hours   0.11- 0.19 25 units/kg  (2000 units Max) None Increase by  2 units/kg/hr 6 hours   0.2 - 0.29 0 None Increase by  1 units/kg/hr 6 hours   0.3 - 0.5 0 None No Change 6 hours (after 2 consecutive levels in range check qAM)   0.51 - 0.6 0 None Decrease by  1 units/kg/hr 6 hours   0.61 - 0.8 0 30 minutes Decrease by  2 units/kg/hr 6 hours   0.81 - 1 0 60 minutes Decrease by  3 units/kg/hr 6 hours   >1 0 Hold  After Anti-Xa less than 0.5 decrease previous rate by  4 units/kg/hr  Every 2 hours until Anti-Xa  less than 0.5 then when infusion restarts in 6 hours     Results from last 7 days   Lab Units 01/27/25  0713 01/26/25  0730 01/25/25 2130   INR   --   --  1.14*   HEMOGLOBIN g/dL 11.5* 11.3* 11.6*   HEMATOCRIT % 35.3* 34.8* 34.8*   PLATELETS 10*3/mm3 115* 124* 132*       Date   Time   Anti-Xa Current Rate (units/kg/hr) Bolus   (units) Rate Change   (units/kg/hr) New Rate (units/kg/hr) Repeat  Anti-Xa Comments /  Pump Check    1/25 2108 pending 12  (from OSH) -- -- 12 STAT Transferred from [facility] in [place] with heparin infusion running at 12 u/khg/h per RN. Will continue at this rate and re-evaluate once labs are drawn   1/25 2130 0.10 12 3900 +3 15 0600 Ivory 3239 -acb   1/26 0730 0.47 15 -- -- 15 1400 Yessi 3252   1/26 1417 0.28 15 -- +1 16 2200 Yessi 3252   1/26 2140 0.36 16 -- -- 16 0600 Sayra 3239 -b   1/27 0713 0.38 16 -- -- 16 0600 D/w SHANNAN Hair                                                                                                                                                                         Rich Otero [facility]  1/27/2025  08:21 EST

## 2025-01-27 NOTE — CONSULTS
Chart reviewed, there is no A1c is ordered, no meds exclusively for hyperglycemia management on the patients chart and no hx of DM noted. At this time we do not feel this patient would benefit from diabetes education. Please reconsult should patient needs change

## 2025-01-27 NOTE — DISCHARGE SUMMARY
[facility] Medicine Services  DISCHARGE SUMMARY    Patient Name: Albin Andino  : 1940  MRN: [id number]    Date of Admission: 2025  7:55 PM  Date of Discharge:  2025  Primary Care Physician: Ariela Brannon DO    Consults       Date and Time Order Name Status Description    2025  8:56 PM Inpatient Cardiology Consult Completed             Hospital Course     Presenting Problem:    Active Hospital Problems    Diagnosis  POA    **NSTEMI (non-ST elevated myocardial infarction) [I21.4]  Yes    Heart failure with preserved left ventricular function (HFpEF) [I50.30]  Yes    Coronary artery disease involving native coronary artery of native heart with angina pectoris [I25.119]  Yes    Essential hypertension [I10]  Yes    Hyperlipidemia LDL goal <70 [E78.5]  Yes      Resolved Hospital Problems   No resolved problems to display.          Hospital Course:  Albin Andino is a 84 y.o. male with past medical history of coronary artery disease status post CABG,  with subsequent PCI to SVG in 2024, systolic heart failure, A-fib, severe mitral regurg, moderate aortic stenosis, hypertension, dyslipidemia, chronic anemia presented to the hospital with NSTEMI. Underwent LHC by Dr. Cruz with showed severe in-stent restenosis of SVG to RCA status post balloon angioplasty.  Chest pain free postprocedure.  Cleared for discharge by cardiology team    NSTEMI  CAD   Hypertension  History of remote CABG with more recent stenting of saphenous vein grafts (2024)  Trop elevated and up trending  Continue heparin drip  Continue aspirin, Plavix,   New on discharge: Toprol-XL 50 mg daily and Losartan 25 mg daily  Amlodipine discontinued  LHC by Dr. Cruz showed severe in-stent restenosis of SVG to RCA status post balloon angioplasty     HFrEF  Valvular Heart Disease (moderate to severe MR and mild to moderate AS)  Most recent EF 51-55% with grade II diastolic  dysfunction     Paroxysmal atrial fibrillation  Currently NSR  Continue Toprol-XL and on heparin drip     HLD  Continue Lipitor       RLS  Continue home neurontin      Discharge Follow Up Recommendations for outpatient labs/diagnostics:  PCP 1 week    Day of Discharge     See Progress note from day of discharge      Pertinent  and/or Most Recent Results     LAB RESULTS:      Lab 01/27/25  0713 01/26/25  2140 01/26/25  1417 01/26/25  0730 01/25/25  2130   WBC 6.14  --   --  5.76 6.13   HEMOGLOBIN 11.5*  --   --  11.3* 11.6*   HEMATOCRIT 35.3*  --   --  34.8* 34.8*   PLATELETS 115*  --   --  124* 132*   NEUTROS ABS 4.72  --   --  4.18 4.75   IMMATURE GRANS (ABS) 0.02  --   --  0.03 0.02   LYMPHS ABS 0.81  --   --  0.87 0.78   MONOS ABS 0.46  --   --  0.52 0.46   EOS ABS 0.13  --   --  0.16 0.12   MCV 88.7  --   --  89.9 89.0   PROTIME  --   --   --   --  14.7*   APTT  --   --   --   --  31.7*   HEPARIN ANTI-XA 0.38 0.36 0.28* 0.47 0.10*         Lab 01/27/25  0713 01/26/25  0730 01/25/25  2130   SODIUM 140 140 142   POTASSIUM 3.8 3.7 3.9   CHLORIDE 104 103 105   CO2 26.0 30.0* 29.0   ANION GAP 10.0 7.0 8.0   BUN 14 12 14   CREATININE 0.76 0.77 0.85   EGFR 87.5 87.2 84.6   GLUCOSE 90 87 117*   CALCIUM 8.9 9.2 9.4   MAGNESIUM  --  2.5* 1.8         Lab 01/25/25  2130   TOTAL PROTEIN 5.9*   ALBUMIN 3.9   GLOBULIN 2.0   ALT (SGPT) 15   AST (SGOT) 46*   BILIRUBIN 0.6   ALK PHOS 89         Lab 01/26/25  1553 01/26/25  0730 01/26/25  0157 01/25/25  2239 01/25/25  2130   PROBNP  --   --   --   --  2,589.0*   HSTROP T 530* 811* 698* 541* 492*   PROTIME  --   --   --   --  14.7*   INR  --   --   --   --  1.14*         Lab 01/26/25  0730   CHOLESTEROL 111   LDL CHOL 57   HDL CHOL 36*   TRIGLYCERIDES 96             Brief Urine Lab Results       None          Microbiology Results (last 10 days)       ** No results found for the last 240 hours. **            XR Chest 1 View    Result Date: 1/26/2025  XR CHEST 1 VW Date of Exam:  1/26/2025 3:05 AM EST Indication: NSTEMI Comparison: CT 12/22/2024. Findings: Stable vascular congestion without evidence of decompensated prudencio edema. No new focal consolidation. Unchanged heart and mediastinal contours. No significant pleural effusion or distinct pneumothorax.     Impression: Stable exam as above. Electronically Signed: Suhas Maurice MD  1/26/2025 7:44 AM EST  Workstation ID: [id number]             Results for orders placed during the hospital encounter of 12/21/24    Adult Transthoracic Echo Complete W/ Cont if Necessary Per Protocol    Interpretation Summary    Left ventricular ejection fraction appears to be 51 - 55%.    Left ventricular diastolic function is consistent with (grade II w/high LAP) pseudonormalization.    The right ventricular cavity is borderline dilated.    The left atrial cavity is mildly dilated.    Aortic valve maximum pressure gradient is 10 mmHg.    Estimated right ventricular systolic pressure from tricuspid regurgitation is normal (<35 mmHg). Calculated right ventricular systolic pressure from tricuspid regurgitation is 8 mmHg.      Plan for Follow-up of Pending Labs/Results:     Discharge Details        Discharge Medications        New Medications        Instructions Start Date   losartan 25 MG tablet  Commonly known as: COZAAR   25 mg, Oral, Daily      metoprolol tartrate 50 MG tablet  Commonly known as: LOPRESSOR   Take 0.5 (one-half) tablet by mouth 2 (Two) Times a Day.             Changes to Medications        Instructions Start Date   gabapentin 600 MG tablet  Commonly known as: NEURONTIN  What changed:   how much to take  how to take this  when to take this  additional instructions   take 1 tablet by mouth every morning and take 2 tablets by mouth every evening             Continue These Medications        Instructions Start Date   aspirin 81 MG EC tablet   81 mg, Oral, Daily      atorvastatin 20 MG tablet  Commonly known as: LIPITOR   20 mg, Oral, Nightly     "  cholecalciferol 25 MCG (1000 UT) tablet  Commonly known as: VITAMIN D3   1,000 Units, 2 Times Daily      clopidogrel 75 MG tablet  Commonly known as: PLAVIX   75 mg, Oral, Daily      cyanocobalamin 500 MCG tablet  Commonly known as: VITAMIN B-12   500 mcg, Daily      folic acid 400 MCG tablet  Commonly known as: FOLVITE   400 mcg, Daily      ICaps Areds 2 capsule   1 capsule, 2 Times Daily      isosorbide mononitrate 60 MG 24 hr tablet  Commonly known as: IMDUR   60 mg, Oral, Daily      Linzess 72 MCG capsule capsule  Generic drug: linaclotide   1 capsule, Daily      nitroglycerin 0.4 MG SL tablet  Commonly known as: NITROSTAT   0.4 mg, Oral, Every 5 Minutes PRN      pantoprazole 40 MG EC tablet  Commonly known as: PROTONIX   1 tablet, 2 Times Daily      vitamin B-6 100 MG tablet  Commonly known as: PYRIDOXINE   100 mg, Daily      Vitamin C 500 MG capsule   500 mg, Daily             Stop These Medications      amLODIPine 2.5 MG tablet  Commonly known as: NORVASC     metoprolol succinate XL 25 MG 24 hr tablet  Commonly known as: TOPROL-XL              Allergies   Allergen Reactions    Hydrocodone Confusion     Pt reports that it \"makes him go crazy\"    Hydrocodone-Acetaminophen Confusion and Other (See Comments)    Morphine And Codeine Other (See Comments)         Discharge Disposition:  Home or Self Care    Diet:  Hospital:  Diet Order   Procedures    Diet: Regular/House; Fluid Consistency: Thin (IDDSI 0)            Activity:  As tolerated    Restrictions or Other Recommendations:  None       CODE STATUS:    Code Status and Medical Interventions: CPR (Attempt to Resuscitate); Full Support   Ordered at: 01/25/25 2056     Level Of Support Discussed With:    Patient     Code Status (Patient has no pulse and is not breathing):    CPR (Attempt to Resuscitate)     Medical Interventions (Patient has pulse or is breathing):    Full Support       Future Appointments   Date Time Provider Department Center   4/25/2025  3:30 " Niels Calle IV, MD [facility]       Additional Instructions for the Follow-ups that You Need to Schedule       Ambulatory Referral to Cardiac Rehab   As directed                    Loli Spicer MD  01/27/25      Time Spent on Discharge:  I spent  25  minutes on this discharge activity which included: face-to-face encounter with the patient, reviewing the data in the system, coordination of the care with the nursing staff as well as consultants, documentation, and entering orders.

## 2025-01-27 NOTE — PROGRESS NOTES
[facility] Medicine Services  PROGRESS NOTE    Patient Name: Albin Andino  : 1940  MRN: [id number]    Date of Admission: 2025  Primary Care Physician: Ariela Brannon,     Subjective   Subjective     CC:  Follow-up for chest pain    HPI:  Patient was seen and examined this morning.  Had an episode of chest pain overnight improved with nitro.  Currently n.p.o. for planned left heart cath today by Dr. Cruz      Objective   Objective     Vital Signs:   Temp:  [97 °F (36.1 °C)-98 °F (36.7 °C)] 97.1 °F (36.2 °C)  Heart Rate:  [63-81] 81  Resp:  [16-18] 18  BP: (118-149)/(69-85) 118/69  Flow (L/min) (Oxygen Therapy):  [2] 2     Physical Exam:  General: Comfortable, not in distress, conversant and cooperative  Head: Atraumatic and normocephalic  Eyes: No Icterus. No pallor  Ears:  Ears appear intact with no abnormalities noted  Throat: No oral lesions, no thrush  Neck: Supple, trachea midline  Lungs: Clear to auscultation bilaterally, equal air entry, no wheezing or crackles  Heart:  Normal S1 and S2, no murmur, no gallop, No JVD, no lower extremity swelling  Abdomen:  Soft, no tenderness, no organomegaly, normal bowel sounds, no organomegaly  Extremities: pulses equal bilaterally  Skin: No bleeding, bruising or rash, normal skin turgor and elasticity  Neurologic: Cranial nerves appear intact with no evidence of facial asymmetry, normal motor and sensory functions in all 4 extremities  Psych: Alert and oriented x 3, normal mood    Results Reviewed:  LAB RESULTS:      Lab 25  2140 25  1553 25  1417 25  0730 25  0157 25  2239 25  2130   WBC  --   --   --  5.76  --   --  6.13   HEMOGLOBIN  --   --   --  11.3*  --   --  11.6*   HEMATOCRIT  --   --   --  34.8*  --   --  34.8*   PLATELETS  --   --   --  124*  --   --  132*   NEUTROS ABS  --   --   --  4.18  --   --  4.75   IMMATURE GRANS (ABS)  --   --   --  0.03  --   --  0.02    LYMPHS ABS  --   --   --  0.87  --   --  0.78   MONOS ABS  --   --   --  0.52  --   --  0.46   EOS ABS  --   --   --  0.16  --   --  0.12   MCV  --   --   --  89.9  --   --  89.0   PROTIME  --   --   --   --   --   --  14.7*   APTT  --   --   --   --   --   --  31.7*   HEPARIN ANTI-XA 0.36  --  0.28* 0.47  --   --  0.10*   HSTROP T  --  530*  --  811* 698*   < > 492*    < > = values in this interval not displayed.         Lab 01/26/25  0730 01/25/25  2130   SODIUM 140 142   POTASSIUM 3.7 3.9   CHLORIDE 103 105   CO2 30.0* 29.0   ANION GAP 7.0 8.0   BUN 12 14   CREATININE 0.77 0.85   EGFR 87.2 84.6   GLUCOSE 87 117*   CALCIUM 9.2 9.4   MAGNESIUM 2.5* 1.8         Lab 01/25/25  2130   TOTAL PROTEIN 5.9*   ALBUMIN 3.9   GLOBULIN 2.0   ALT (SGPT) 15   AST (SGOT) 46*   BILIRUBIN 0.6   ALK PHOS 89         Lab 01/26/25  1553 01/26/25  0730 01/26/25  0157 01/25/25  2239 01/25/25  2130   PROBNP  --   --   --   --  2,589.0*   HSTROP T 530* 811* 698* 541* 492*   PROTIME  --   --   --   --  14.7*   INR  --   --   --   --  1.14*         Lab 01/26/25  0730   CHOLESTEROL 111   LDL CHOL 57   HDL CHOL 36*   TRIGLYCERIDES 96             Brief Urine Lab Results       None            Microbiology Results Abnormal       None            XR Chest 1 View    Result Date: 1/26/2025  XR CHEST 1 VW Date of Exam: 1/26/2025 3:05 AM EST Indication: NSTEMI Comparison: CT 12/22/2024. Findings: Stable vascular congestion without evidence of decompensated prudencio edema. No new focal consolidation. Unchanged heart and mediastinal contours. No significant pleural effusion or distinct pneumothorax.     Impression: Impression: Stable exam as above. Electronically Signed: Suhas Maurice MD  1/26/2025 7:44 AM EST  Workstation ID: [id number]     Results for orders placed during the hospital encounter of 12/21/24    Adult Transthoracic Echo Complete W/ Cont if Necessary Per Protocol    Interpretation Summary    Left ventricular ejection fraction appears to be  51 - 55%.    Left ventricular diastolic function is consistent with (grade II w/high LAP) pseudonormalization.    The right ventricular cavity is borderline dilated.    The left atrial cavity is mildly dilated.    Aortic valve maximum pressure gradient is 10 mmHg.    Estimated right ventricular systolic pressure from tricuspid regurgitation is normal (<35 mmHg). Calculated right ventricular systolic pressure from tricuspid regurgitation is 8 mmHg.      Current medications:  Scheduled Meds:amLODIPine, 2.5 mg, Oral, Daily  ascorbic acid, 500 mg, Oral, Daily  aspirin, 81 mg, Oral, Daily  atorvastatin, 20 mg, Oral, Nightly  clopidogrel, 75 mg, Oral, Daily  folic acid, 400 mcg, Oral, Daily  gabapentin, 1,200 mg, Oral, Nightly  isosorbide mononitrate, 60 mg, Oral, Daily  metoprolol succinate XL, 25 mg, Oral, Daily  pantoprazole, 40 mg, Oral, BID  pharmacy consult - MTM, , Not Applicable, Daily  sodium chloride, 10 mL, Intravenous, Q12H  thiamine, 100 mg, Oral, Daily  cyanocobalamin, 500 mcg, Oral, Daily  vitamin B-6, 100 mg, Oral, Daily      Continuous Infusions:heparin, 16 Units/kg/hr, Last Rate: 16 Units/kg/hr (01/26/25 1550)  Pharmacy to Dose Heparin,       PRN Meds:.  senna-docusate sodium **AND** polyethylene glycol **AND** bisacodyl **AND** bisacodyl    Magnesium Cardiology Dose Replacement - Follow Nurse / BPA Driven Protocol    nitroglycerin    Pharmacy to Dose Heparin    Potassium Replacement - Follow Nurse / BPA Driven Protocol    sodium chloride    sodium chloride    Assessment & Plan   Assessment & Plan     Active Hospital Problems    Diagnosis  POA    NSTEMI (non-ST elevated myocardial infarction) [I21.4]  Yes      Resolved Hospital Problems   No resolved problems to display.        Brief Hospital Course to date:  Albin Andino is a 84 y.o. male with past medical history of coronary artery disease status post CABG, 1980 with subsequent PCI to [facility] in September 2024, systolic heart failure, A-fib, severe  mitral regurg, moderate aortic stenosis, hypertension, dyslipidemia, chronic anemia presented to the hospital with NSTEMI    NSTEMI  CAD   Hypertension  History of remote CABG with more recent stenting of saphenous vein grafts (9/2024)  Trop elevated and up trending  Continue heparin drip  Continue aspirin, Plavix, and Toprol-XL 25 mg daily  Cardiology following.  Tentative plan for left heart cath today.     HFrEF  Valvular Heart Disease (moderate to severe MR and mild to moderate AS)  Most recent EF 51-55% with grade II diastolic dysfunction     Paroxysmal atrial fibrillation  Currently NSR  Continue Toprol-XL and on heparin drip     HLD  Continue Lipitor       RLS  Continue home neurontin        Expected Discharge Location and Transportation: home   Expected Discharge   Expected Discharge Date: 1/27/2025; Expected Discharge Time:      VTE Prophylaxis:  Pharmacologic VTE prophylaxis orders are present.         AM-PAC 6 Clicks Score (PT): 19 (01/26/25 1504)    CODE STATUS:   Code Status and Medical Interventions: CPR (Attempt to Resuscitate); Full Support   Ordered at: 01/25/25 2056     Level Of Support Discussed With:    Patient     Code Status (Patient has no pulse and is not breathing):    CPR (Attempt to Resuscitate)     Medical Interventions (Patient has pulse or is breathing):    Full Support       Loli Spicer MD  01/27/25

## 2025-01-28 ENCOUNTER — TELEPHONE (OUTPATIENT)
Dept: CARDIOLOGY | Facility: CLINIC | Age: 85
End: 2025-01-28

## 2025-01-28 DIAGNOSIS — I25.110 CORONARY ARTERY DISEASE INVOLVING NATIVE CORONARY ARTERY OF NATIVE HEART WITH UNSTABLE ANGINA PECTORIS: Primary | ICD-10-CM

## 2025-01-28 NOTE — OUTREACH NOTE
Prep Survey      Flowsheet Row Responses   Denominational facility patient discharged from? [place]   Is LACE score < 7 ? No   Eligibility Readm Mgmt   Discharge diagnosis NSTEMI (non-ST elevated myocardial infarction)   Does the patient have one of the following disease processes/diagnoses(primary or secondary)? Acute MI (STEMI,NSTEMI)   Prep survey completed? Yes            Lenka TURK - Registered Nurse

## 2025-01-28 NOTE — TELEPHONE ENCOUNTER
Caller: Loraine Andino    Relationship to patient: Emergency Contact    Best call back number: [phone]       Type of visit:  HOSPITAL FOLLOW UP     Requested date: DR BAHENA RECOMMENDATION     Additional notes: NEXT AVAILABLE IS NOT UNTIL 03/26/25. PLEASE CALL PTS WIFE TO SCHEDULE.

## 2025-01-29 ENCOUNTER — TELEPHONE (OUTPATIENT)
Dept: CARDIOLOGY | Facility: HOSPITAL | Age: 85
End: 2025-01-29
Payer: MEDICARE

## 2025-01-29 NOTE — TELEPHONE ENCOUNTER
Spouse contacted office to cancel appoint on 1/30/25, stating that patient was not ready to travel back to [place] for appointment, offered to reschedule for another day and they decline, they do not have MyChart for telehealth.

## 2025-01-30 LAB — ACT BLD: 285 SECONDS (ref 82–152)

## 2025-01-31 ENCOUNTER — READMISSION MANAGEMENT (OUTPATIENT)
Dept: CALL CENTER | Facility: HOSPITAL | Age: 85
End: 2025-01-31
Payer: MEDICARE

## 2025-01-31 NOTE — OUTREACH NOTE
AMI Week 1 Survey      Flowsheet Row Responses   [facility]az[employer] facility patient discharged from? [place]   Does the patient have one of the following disease processes/diagnoses(primary or secondary)? Acute MI (STEMI,NSTEMI)   Week 1 attempt successful? No   Unsuccessful attempts Attempt 1            Alli STEPHEN - Registered Nurse

## 2025-02-03 NOTE — TELEPHONE ENCOUNTER
Will need laser rep.    H. FAMILIA. John Cruz MD FACC, [facility]  Interventional and General Cardiology

## 2025-02-04 ENCOUNTER — READMISSION MANAGEMENT (OUTPATIENT)
Dept: CALL CENTER | Facility: HOSPITAL | Age: 85
End: 2025-02-04
Payer: MEDICARE

## 2025-02-04 DIAGNOSIS — I10 ESSENTIAL HYPERTENSION: ICD-10-CM

## 2025-02-04 DIAGNOSIS — I25.119 CORONARY ARTERY DISEASE INVOLVING NATIVE CORONARY ARTERY OF NATIVE HEART WITH ANGINA PECTORIS: Primary | ICD-10-CM

## 2025-02-05 NOTE — OUTREACH NOTE
AMI Week 1 Survey      Flowsheet Row Responses   [facility] patient discharged from? [place]   Does the patient have one of the following disease processes/diagnoses(primary or secondary)? Acute MI (STEMI,NSTEMI)   Week 1 attempt successful? Yes   Call start time 1929   Call end time 1934   Discharge diagnosis NSTEMI (non-ST elevated myocardial infarction)   Meds reviewed with patient/caregiver? Yes   Is the patient having any side effects they believe may be caused by any medication additions or changes? No   Does the patient have all prescriptions related to this admission filled (includes statins,anticoagulants,HTN meds,anti-arrhythmia meds) Yes   Is the patient taking all medications as directed (includes completed medication regime)? Yes   Does the patient have a primary care provider?  Yes   Does the patient have an appointment with their PCP,cardiologist,or clinic within 7 days of discharge? Yes   Has the patient kept scheduled appointments due by today? N/A   Comments Has appt tomorrow with PCP   Psychosocial issues? No   Did the patient receive a copy of their discharge instructions? Yes   Nursing interventions Reviewed instructions with patient   What is the patient's perception of their health status since discharge? Improving   Nursing interventions Nurse provided patient education   Is the patient/caregiver able to teach back signs and symptoms of when to call for help immediately: Sudden chest discomfort, Sudden discomfort in arms, back, neck or jaw, Shortness of breath at any time, Sudden sweating or clammy skin, Nausea or vomiting, Dizziness or lightheadedness, Irregular or rapid heart rate   Is the patient/caregiver able to teach back lifestyle changes to help prevent MIs Regular exercise as approved by provider, Heart healthy diet, Maintaining a healthy weight, Reducing stress   Is the patient/caregiver able to teach back ways to prevent a second heart attack: Take medications, Follow up  with MD   If the patient is a current smoker, are they able to teach back resources for cessation? Not a smoker   Is the patient/caregiver able to teach back the hierarchy of who to call/visit for symptoms/problems? PCP, Specialist, Home health nurse, Urgent Care, ED, 911 Yes   Additional teach back comments States he is doing well.  Does have some chest pains at time and will take a nitro and will improve.  Advised to let  cardiology know this.   Week 1 call completed? Yes   Wrap up additional comments Pt to let cardiology know he still has chest pain at time where he is taking nitro with improvement.   Call end time 1934            Nkechi DIANE - Licensed Nurse

## 2025-02-10 ENCOUNTER — TELEPHONE (OUTPATIENT)
Dept: CARDIOLOGY | Facility: CLINIC | Age: 85
End: 2025-02-10
Payer: MEDICARE

## 2025-02-10 NOTE — TELEPHONE ENCOUNTER
Lab orders faxed to PCP. Spoke with daughter, Comfort. She is aware. She also asked that his [facility] instructions be emailed to her since his wife is unable to get them. [email or web address]. Message sent to scheduling.

## 2025-02-12 ENCOUNTER — READMISSION MANAGEMENT (OUTPATIENT)
Dept: CALL CENTER | Facility: HOSPITAL | Age: 85
End: 2025-02-12
Payer: MEDICARE

## 2025-02-12 NOTE — OUTREACH NOTE
AMI Week 2 Survey      Flowsheet Row Responses   [facility] patient discharged from? [place]   Does the patient have one of the following disease processes/diagnoses(primary or secondary)? Acute MI (STEMI,NSTEMI)   Week 2 attempt successful? Yes   Call start time 1246   Call end time 1248   Discharge diagnosis NSTEMI (non-ST elevated myocardial infarction)   Person spoke with today (if not patient) and relationship Patient   Medication alerts for this patient Losartan, Lopressor   Meds reviewed with patient/caregiver? Yes   Is the patient having any side effects they believe may be caused by any medication additions or changes? No   Does the patient have all prescriptions related to this admission filled (includes statins,anticoagulants,HTN meds,anti-arrhythmia meds) Yes   Prescription comments No questions or concerns with medications.   Is the patient taking all medications as directed (includes completed medication regime)? Yes   Does the patient have a primary care provider?  Yes   Comments regarding PCP PCP--Dr. Ariela Brannon   Has the patient kept scheduled appointments due by today? Yes   Has home health visited the patient within 72 hours of discharge? N/A   Psychosocial issues? No   Did the patient receive a copy of their discharge instructions? Yes   Nursing interventions Reviewed instructions with patient   What is the patient's perception of their health status since discharge? Improving   Nursing interventions Nurse provided patient education   Is the patient/caregiver able to teach back signs and symptoms of when to call for help immediately: Sudden chest discomfort, Sudden discomfort in arms, back, neck or jaw, Shortness of breath at any time, Sudden sweating or clammy skin, Nausea or vomiting, Dizziness or lightheadedness, Irregular or rapid heart rate   Nursing interventions Nurse provided patient education   Is the patient/caregiver able to teach back lifestyle changes to help prevent MIs  Regular exercise as approved by provider, Heart healthy diet   Is the patient/caregiver able to teach back ways to prevent a second heart attack: Take medications, Follow up with MD, Manage risk factors   If the patient is a current smoker, are they able to teach back resources for cessation? Not a smoker   Is the patient/caregiver able to teach back the hierarchy of who to call/visit for symptoms/problems? PCP, Specialist, Home health nurse, Urgent Care, ED, 911 Yes   Week 2 call completed? Yes   Revoked No further contact(revokes)-requires comment   Is the patient interested in additional calls from an ambulatory ? No   Would this patient benefit from a Referral to [facility] Social Work? No   Graduated/Revoked comments Patient doing well. Patient is monitoring his b/p at home, wnl. Cath site is healed. No concerns or needs. No further calls needed.   Call end time 3579            Bree LESLIE - Registered Nurse

## 2025-02-16 PROBLEM — I25.110 CORONARY ARTERY DISEASE INVOLVING NATIVE CORONARY ARTERY OF NATIVE HEART WITH UNSTABLE ANGINA PECTORIS: Status: RESOLVED | Noted: 2025-01-28 | Resolved: 2025-02-16

## 2025-02-16 PROBLEM — I21.4 NSTEMI (NON-ST ELEVATED MYOCARDIAL INFARCTION): Status: RESOLVED | Noted: 2025-01-25 | Resolved: 2025-02-16

## 2025-02-17 ENCOUNTER — HOSPITAL ENCOUNTER (OUTPATIENT)
Facility: HOSPITAL | Age: 85
Setting detail: HOSPITAL OUTPATIENT SURGERY
Discharge: HOME | End: 2025-02-17
Attending: INTERNAL MEDICINE | Admitting: INTERNAL MEDICINE
Payer: MEDICARE

## 2025-02-17 ENCOUNTER — TRANSCRIBE ORDERS (OUTPATIENT)
Dept: CARDIAC REHAB | Facility: HOSPITAL | Age: 85
End: 2025-02-17
Payer: MEDICARE

## 2025-02-17 VITALS
OXYGEN SATURATION: 96 % | TEMPERATURE: 96.8 F | BODY MASS INDEX: 22.7 KG/M2 | HEIGHT: 73 IN | WEIGHT: 171.3 LBS | SYSTOLIC BLOOD PRESSURE: 123 MMHG | RESPIRATION RATE: 15 BRPM | DIASTOLIC BLOOD PRESSURE: 70 MMHG | HEART RATE: 54 BPM

## 2025-02-17 DIAGNOSIS — G25.81 RESTLESS LEGS SYNDROME: ICD-10-CM

## 2025-02-17 DIAGNOSIS — Z98.61 S/P PTCA (PERCUTANEOUS TRANSLUMINAL CORONARY ANGIOPLASTY): Primary | ICD-10-CM

## 2025-02-17 DIAGNOSIS — I25.110 CORONARY ARTERY DISEASE INVOLVING NATIVE CORONARY ARTERY OF NATIVE HEART WITH UNSTABLE ANGINA PECTORIS: Primary | ICD-10-CM

## 2025-02-17 LAB — HBA1C MFR BLD: 4.8 % (ref 4.8–5.6)

## 2025-02-17 PROCEDURE — 25010000002 MIDAZOLAM PER 1 MG: Performed by: INTERNAL MEDICINE

## 2025-02-17 PROCEDURE — C1894 INTRO/SHEATH, NON-LASER: HCPCS | Performed by: INTERNAL MEDICINE

## 2025-02-17 PROCEDURE — C1769 GUIDE WIRE: HCPCS | Performed by: INTERNAL MEDICINE

## 2025-02-17 PROCEDURE — C1885 CATH, TRANSLUMIN ANGIO LASER: HCPCS | Performed by: INTERNAL MEDICINE

## 2025-02-17 PROCEDURE — 92937 PRQ TRLUML REVSC CAB GRF 1: CPT | Performed by: INTERNAL MEDICINE

## 2025-02-17 PROCEDURE — 36415 COLL VENOUS BLD VENIPUNCTURE: CPT

## 2025-02-17 PROCEDURE — 25810000003 SODIUM CHLORIDE 0.9 % SOLUTION: Performed by: INTERNAL MEDICINE

## 2025-02-17 PROCEDURE — C1887 CATHETER, GUIDING: HCPCS | Performed by: INTERNAL MEDICINE

## 2025-02-17 PROCEDURE — C9610: HCPCS | Performed by: INTERNAL MEDICINE

## 2025-02-17 PROCEDURE — 25010000002 LIDOCAINE PF 1% 1 % SOLUTION: Performed by: INTERNAL MEDICINE

## 2025-02-17 PROCEDURE — 83036 HEMOGLOBIN GLYCOSYLATED A1C: CPT | Performed by: INTERNAL MEDICINE

## 2025-02-17 PROCEDURE — 25010000002 FENTANYL CITRATE (PF) 50 MCG/ML SOLUTION: Performed by: INTERNAL MEDICINE

## 2025-02-17 PROCEDURE — 25010000002 NICARDIPINE 2.5 MG/ML SOLUTION: Performed by: INTERNAL MEDICINE

## 2025-02-17 PROCEDURE — 25010000002 HEPARIN (PORCINE) PER 1000 UNITS: Performed by: INTERNAL MEDICINE

## 2025-02-17 PROCEDURE — 25510000001 IOPAMIDOL PER 1 ML: Performed by: INTERNAL MEDICINE

## 2025-02-17 PROCEDURE — 85347 COAGULATION TIME ACTIVATED: CPT

## 2025-02-17 PROCEDURE — C1725 CATH, TRANSLUMIN NON-LASER: HCPCS | Performed by: INTERNAL MEDICINE

## 2025-02-17 RX ORDER — SODIUM CHLORIDE 9 MG/ML
3 INJECTION, SOLUTION INTRAVENOUS CONTINUOUS
Status: ACTIVE | OUTPATIENT
Start: 2025-02-17 | End: 2025-02-17

## 2025-02-17 RX ORDER — MIDAZOLAM HYDROCHLORIDE 1 MG/ML
INJECTION, SOLUTION INTRAMUSCULAR; INTRAVENOUS
Status: DISCONTINUED | OUTPATIENT
Start: 2025-02-17 | End: 2025-02-17 | Stop reason: HOSPADM

## 2025-02-17 RX ORDER — HEPARIN SODIUM 1000 [USP'U]/ML
INJECTION, SOLUTION INTRAVENOUS; SUBCUTANEOUS
Status: DISCONTINUED | OUTPATIENT
Start: 2025-02-17 | End: 2025-02-17 | Stop reason: HOSPADM

## 2025-02-17 RX ORDER — IOPAMIDOL 755 MG/ML
INJECTION, SOLUTION INTRAVASCULAR
Status: DISCONTINUED | OUTPATIENT
Start: 2025-02-17 | End: 2025-02-17 | Stop reason: HOSPADM

## 2025-02-17 RX ORDER — ASPIRIN 81 MG/1
324 TABLET, CHEWABLE ORAL ONCE
Status: COMPLETED | OUTPATIENT
Start: 2025-02-17 | End: 2025-02-17

## 2025-02-17 RX ORDER — GABAPENTIN 600 MG/1
1200 TABLET ORAL EVERY EVENING
Start: 2025-02-17

## 2025-02-17 RX ORDER — LIDOCAINE HYDROCHLORIDE 10 MG/ML
INJECTION, SOLUTION EPIDURAL; INFILTRATION; INTRACAUDAL; PERINEURAL
Status: DISCONTINUED | OUTPATIENT
Start: 2025-02-17 | End: 2025-02-17 | Stop reason: HOSPADM

## 2025-02-17 RX ORDER — ASPIRIN 81 MG/1
TABLET, CHEWABLE ORAL
Status: DISCONTINUED
Start: 2025-02-17 | End: 2025-02-17 | Stop reason: HOSPADM

## 2025-02-17 RX ORDER — FENTANYL CITRATE 50 UG/ML
INJECTION, SOLUTION INTRAMUSCULAR; INTRAVENOUS
Status: DISCONTINUED | OUTPATIENT
Start: 2025-02-17 | End: 2025-02-17 | Stop reason: HOSPADM

## 2025-02-17 RX ADMIN — ASPIRIN 81 MG CHEWABLE TABLET 243 MG: 81 TABLET CHEWABLE at 08:11

## 2025-02-17 NOTE — H&P
84-year-old gentleman recently admitted for NSTEMI found to have severe in-stent restenosis of SVG to RCA.  Patient brought back to the Cath Lab for laser atherectomy and drug-coated balloon angioplasty.    Lab Results   Component Value Date    WBC 8.1 02/12/2025    HGB 13.2 (L) 02/12/2025    HCT 38.3 02/12/2025    MCV 86.1 02/12/2025     02/12/2025     Lab Results   Component Value Date    GLUCOSE 90 01/27/2025    BUN 16 02/12/2025    CREATININE 0.9 02/12/2025     02/12/2025    K 4.2 02/12/2025     02/12/2025    CALCIUM 9.5 02/12/2025    PROTEINTOT 6.5 02/06/2025    ALBUMIN 4.4 02/06/2025    ALT 13 02/06/2025    AST 20 02/06/2025    ALKPHOS 80 02/06/2025    BILITOT 1.0 02/06/2025    GLOB 2.0 01/25/2025    AGRATIO 2.0 01/25/2025    BCR 18 02/12/2025    ANIONGAP 10 02/12/2025    EGFR 87.5 01/27/2025     Lab Results   Component Value Date    HGBA1C 4.80 02/17/2025     Lab Results   Component Value Date    CHOL 111 01/26/2025    TRIG 96 01/26/2025    HDL 36 (L) 01/26/2025    LDL 57 01/26/2025    AST 20 02/06/2025    ALT 13 02/06/2025        Active Hospital Problems    Diagnosis  POA    **Coronary artery disease involving native coronary artery of native heart with unstable angina pectoris [I25.110]  Yes     Priority: High     CABG in the 1980s  PCI 2006 - 2007  Cardiac catheterization (9/6/2024): Occluded native circulation.  Patent LIMA to LAD.  Severely degenerated SVG to RCA and SVG to OM  Cardiac catheterization (9/16/2024): Successful high risk PCI of degenerated sequential SVG to RPDA/RPL--proximal and distal segments--using large-caliber drug-eluting stents.  Residual stenosis of vein graft supplying RPL not treated. Successful high risk PCI of degenerated SVG to OM with large caliber YAMILETH  Cardiac catheterization for NSTEMI (1/27/2025): Severe in-stent restenosis of SVG to RCA status post cutting balloon angioplasty alone.  SVG to OM stent patent.      Heart failure with preserved left  ventricular function (HFpEF) [I50.30]  Yes     Priority: Medium     Echo (12/22/2024): LVEF 53%      Hyperlipidemia LDL goal <70 [E78.5]  Yes        Laser atherectomy and drug-coated balloon angioplasty of SVG to RCA via right radial approach    GRACE Cruz MD [facility], [facility]  Interventional and General Cardiology

## 2025-02-17 NOTE — H&P
[facility] at [facility]  Cardiovascular History and Physical Note         Patient is an 84-year-old gentleman with a history of coronary artery disease, chronic diastolic heart hypertension, hyperlipidemia, and carotid artery disease who presents today to undergo cardiac catheterization with possible drug-coated balloon angioplasty.  The patient was hospitalized last month for NSTEMI.  He underwent cardiac catheterization at that time that showed severe in-stent restenosis of the SVG to the right coronary artery that was treated with Cutting Balloon angioplasty alone.  A previously placed stent in the SVG to the obtuse marginal branch was patent.  It was recommended when drug-coated balloon was available that patient have repeat angioplasty of his vein graft to the right coronary artery.  The patient reports since discharge he has had intermittent chest discomfort requiring the use of sublingual nitroglycerin which does relieve his symptoms.  He is on aspirin and Plavix at home and took a dose this morning.    Cardiac risk factors: Known CAD, hyperlipidemia, hypertension, advanced    Past medical and surgical history, social and family history reviewed in EMR.    REVIEW OF SYSTEMS:   H&P ROS reviewed and pertinent CV ROS as noted in HPI.         Vital Sign Min/Max for last 24 hours  Temp  Min: 96.8 °F (36 °C)  Max: 96.8 °F (36 °C)   BP  Min: 138/87  Max: 146/104   Pulse  Min: 61  Max: 61   Resp  Min: 16  Max: 16   SpO2  Min: 96 %  Max: 96 %   No data recorded    No intake or output data in the 24 hours ending 02/17/25 0749        Physical Exam  Constitutional:       Appearance: He is well-developed.   HENT:      Head: Normocephalic.   Neck:      Vascular: No carotid bruit or JVD.   Cardiovascular:      Rate and Rhythm: Normal rate and regular rhythm.      Heart sounds: Normal heart sounds. No murmur heard.     No friction rub. No gallop.   Pulmonary:      Effort: Pulmonary effort is normal.       Breath sounds: Normal breath sounds.   Abdominal:      General: Bowel sounds are normal. There is no distension.      Palpations: Abdomen is soft.   Skin:     General: Skin is warm and dry.   Neurological:      Mental Status: He is alert and oriented to person, place, and time.            Lab Review:   Labs reviewed in the electronic medical record.  Pertinent findings include:  Lab Results   Component Value Date    GLUCOSE 90 01/27/2025    BUN 16 02/12/2025    CREATININE 0.9 02/12/2025     02/12/2025    K 4.2 02/12/2025     02/12/2025    CALCIUM 9.5 02/12/2025    PROTEINTOT 6.5 02/06/2025    ALBUMIN 4.4 02/06/2025    ALT 13 02/06/2025    AST 20 02/06/2025    ALKPHOS 80 02/06/2025    BILITOT 1.0 02/06/2025    GLOB 2.0 01/25/2025    AGRATIO 2.0 01/25/2025    BCR 18 02/12/2025    ANIONGAP 10 02/12/2025    EGFR 87.5 01/27/2025     Lab Results   Component Value Date    WBC 8.1 02/12/2025    HGB 13.2 (L) 02/12/2025    HCT 38.3 02/12/2025    MCV 86.1 02/12/2025     02/12/2025     Lab Results   Component Value Date    CHOL 111 01/26/2025    CHLPL 138 06/15/2020    TRIG 96 01/26/2025    HDL 36 (L) 01/26/2025    LDL 57 01/26/2025     Results from last 7 days   Lab Units 02/17/25  0635   HEMOGLOBIN A1C % 4.80              Active Hospital Problems    Diagnosis     **Coronary artery disease involving native coronary artery of native heart with unstable angina pectoris      CABG in the 1980s  PCI 2006 - 2007  Cardiac catheterization (9/6/2024): Occluded native circulation.  Patent LIMA to LAD.  Severely degenerated SVG to RCA and SVG to OM  Cardiac catheterization (9/16/2024): Successful high risk PCI of degenerated sequential SVG to RPDA/RPL--proximal and distal segments--using large-caliber drug-eluting stents.  Residual stenosis of vein graft supplying RPL not treated. Successful high risk PCI of degenerated SVG to OM with large caliber YAMILETH  Cardiac catheterization for NSTEMI (1/27/2025): Severe  in-stent restenosis of SVG to RCA status post cutting balloon angioplasty alone.  SVG to OM stent patent.      Heart failure with preserved left ventricular function (HFpEF)      Echo (12/22/2024): LVEF 53%      Hyperlipidemia LDL goal <70       Patient presents today to undergo cardiac catheterization and balloon angioplasty of the SVG to the right coronary artery using a drug-coated balloon.  The risks and benefits of the procedure were discussed the patient is agreeable to proceed.  The patient has been premedicated with 324 mg aspirin today.  He has taken his a.m. dose of Plavix for     Proceed with cardiac catheterization/drug-coated balloon angioplasty of SVG to the RCA likely via right radial approach, however if LIMA is to be injected would be left radial.    Further recommendations to follow      ABDIAZIZ tSover

## 2025-02-17 NOTE — PROGRESS NOTES
Pt. Referred for Phase II Cardiac Rehab. Staff discussed benefits of exercise, program protocol, and educational material provided. Teach back verified.  Permission granted from patient for staff to fax referral information to outlying program at this time.  Staff faxed referral info to [facility].

## 2025-02-18 ENCOUNTER — CALL CENTER PROGRAMS (OUTPATIENT)
Dept: CALL CENTER | Facility: HOSPITAL | Age: 85
End: 2025-02-18
Payer: MEDICARE

## 2025-02-18 ENCOUNTER — TELEPHONE (OUTPATIENT)
Dept: CARDIOLOGY | Facility: CLINIC | Age: 85
End: 2025-02-18
Payer: MEDICARE

## 2025-02-18 LAB
ACT BLD: 239 SECONDS (ref 82–152)
ACT BLD: 250 SECONDS (ref 82–152)
ACT BLD: <50 SECONDS (ref 82–152)

## 2025-02-18 NOTE — OUTREACH NOTE
PCI/Device Survey      Flowsheet Row Responses   Facility patient discharged from? [place]   Procedure date 02/17/25   Procedure (if device, specify in description) PCI   PCI site Right, Arm   Performing MD Dr. Niels Cruz   Attempt successful? Yes   Call start time 0925   Call end time 0931   Person spoke with today (if not patient) and relationship Wife   Nursing interventions Patient education provided   Symptom comments Per pt's wife, the pt is doing well and has no complaints. Patient is tolerating po intake and voiding WNL.   Is the patient taking prescribed medications: ASA, Plavix   Nursing intervention Reminded to continue to take prescribed medications, Nurse provided patient education   Does the patient have any of the following symptoms related to the cath/surgical site? --  [Right radial site with dry dressing intact, per wife's report.]   Nursing intervention Patient education provided   Does the patient have an appointment scheduled with the cardiologist? Yes   If the patient is a current smoker, are they able to teach back resources for cessation? Not a smoker   Did the patient feel prepared to go home on the same day as the procedure? Yes   Is the patient satisfied with the same day discharge process? Yes   PCI/Device call completed Yes   Wrap up additional comments .            Debby GASTON - Registered Nurse

## 2025-02-18 NOTE — TELEPHONE ENCOUNTER
Name: Josefa Dunaway    Relationship: Emergency Contact    Best Callback Number: [phone]    Incoming call to the Hub, requesting to  Reschedule their Other: 1 MO HFU  appointment on  03/18/25.     Per Hub workflow, further review is needed before the task can be completed.    Result of Call: Please reach out to the patient to reschedule      PT'S DAUGHTER WANTS TO KNOW IF SHE CAN BRING PT IN ON A MONDAY OR FRIDAY.

## 2025-02-21 ENCOUNTER — TELEPHONE (OUTPATIENT)
Dept: CARDIOLOGY | Facility: CLINIC | Age: 85
End: 2025-02-21

## 2025-02-21 NOTE — TELEPHONE ENCOUNTER
Patient called reporting he has bloody diarrhea. He denies bleeding anywhere else but reports he can taste blood in his mouth. Denies chest pain, shortness of breath or dizziness. Does not check BP at home. Per HTR- advised he can hold ASA but needs to continue Plavix. Advised to go to ER.     I also called the daughter to make sure they were aware. Verbalized understanding. She did let me know that this has happened previously when another cardiologist put him on a blood thinner (not sure of medication). He has diverticulitis (follows with GI and unsure of who).

## 2025-03-24 ENCOUNTER — OFFICE VISIT (OUTPATIENT)
Dept: CARDIOLOGY | Facility: CLINIC | Age: 85
End: 2025-03-24
Payer: MEDICARE

## 2025-03-24 VITALS
HEART RATE: 60 BPM | BODY MASS INDEX: 23.49 KG/M2 | WEIGHT: 177.2 LBS | HEIGHT: 73 IN | OXYGEN SATURATION: 96 % | DIASTOLIC BLOOD PRESSURE: 62 MMHG | SYSTOLIC BLOOD PRESSURE: 124 MMHG

## 2025-03-24 DIAGNOSIS — I25.10 CORONARY ARTERY DISEASE INVOLVING NATIVE CORONARY ARTERY OF NATIVE HEART WITHOUT ANGINA PECTORIS: ICD-10-CM

## 2025-03-24 DIAGNOSIS — E78.5 HYPERLIPIDEMIA LDL GOAL <70: ICD-10-CM

## 2025-03-24 DIAGNOSIS — I21.4 NSTEMI (NON-ST ELEVATED MYOCARDIAL INFARCTION): ICD-10-CM

## 2025-03-24 DIAGNOSIS — I10 ESSENTIAL HYPERTENSION: Primary | ICD-10-CM

## 2025-03-24 PROCEDURE — 1159F MED LIST DOCD IN RCRD: CPT

## 2025-03-24 PROCEDURE — 1160F RVW MEDS BY RX/DR IN RCRD: CPT

## 2025-03-24 PROCEDURE — G2211 COMPLEX E/M VISIT ADD ON: HCPCS

## 2025-03-24 PROCEDURE — 99214 OFFICE O/P EST MOD 30 MIN: CPT

## 2025-03-24 RX ORDER — CLOPIDOGREL BISULFATE 75 MG/1
75 TABLET ORAL DAILY
Qty: 90 TABLET | Refills: 3 | Status: SHIPPED | OUTPATIENT
Start: 2025-03-24

## 2025-03-24 NOTE — PROGRESS NOTES
"    Cardiology Outpatient Visit      Identification: Albin Andino is a 84 y.o. male who resides in [place], [place]    Reason for visit:  Coronary artery disease involving native coronary artery of (1 month hospital follow up)      Subjective      Patient is an 84-year-old gentleman who returns today for follow-up for his coronary artery disease, carotid artery disease, chronic diastolic heart failure and cardiac risk factors.  The patient was hospitalized in January for NSTEMI.  He underwent cardiac catheterization that showed severe in-stent restenosis of a saphenous vein  graft to his right coronary artery that was treated with Cutting Balloon angioplasty.  A previously placed stent in the SVG to the obtuse marginal was patent.  He returned last month for a staged intervention using drug-coated balloon and repeat angioplasty of the SVG to the right coronary artery. On 2/21/25 patient reported to our office he was having bloody diarrhea as well as tasting blood in his mouth. ASA was stopped but he was advised to continue Plavix by our office. He was also advised to seek ER evaluation. Family was also notified and stated this \"has happened before when on a blood thinner\".  He states symptoms of GIB resolved after 24 hours.  He denies any recurrence.  He did stop aspirin for about 3 days.  He has declined cardiac rehab.  He is mostly sedentary and denies exertional angina.  He states he feels much better since coronary procedures.       Review of Systems   Constitutional: Negative for malaise/fatigue.   Eyes:  Negative for vision loss in left eye and vision loss in right eye.   Cardiovascular:  Negative for chest pain, dyspnea on exertion, near-syncope, orthopnea, palpitations, paroxysmal nocturnal dyspnea and syncope.   Musculoskeletal:  Negative for myalgias.   Neurological:  Negative for brief paralysis, excessive daytime sleepiness, focal weakness, numbness, paresthesias and weakness.   All other " "systems reviewed and are negative.    Allergies   Allergen Reactions    Hydrocodone Confusion     Pt reports that it \"makes him go crazy\"    Hydrocodone-Acetaminophen Other (See Comments) and Confusion     SEE DUPLICATE ENTRY    Morphine And Codeine Hallucinations     Current Outpatient Medications   Medication Instructions    aspirin 81 mg, Oral, Daily    atorvastatin (LIPITOR) 20 mg, Oral, Nightly    cholecalciferol (VITAMIN D3) 1,000 Units, 2 Times Daily    clopidogrel (PLAVIX) 75 mg, Oral, Daily    cyanocobalamin (VITAMIN B-12) 500 mcg, Daily    folic acid (FOLVITE) 400 mcg, Daily    gabapentin (NEURONTIN) 1,200 mg, Oral, Every Evening    isosorbide mononitrate (IMDUR) 60 mg, Oral, Daily    Linzess 72 MCG capsule capsule 1 capsule, As Needed    losartan (COZAAR) 25 mg, Oral, Daily    metoprolol tartrate (LOPRESSOR) 50 MG tablet Take 0.5 (one-half) tablet by mouth 2 (Two) Times a Day.    Multiple Vitamins-Minerals (ICAPS AREDS 2) capsule 1 capsule, 2 Times Daily    nitroglycerin (NITROSTAT) 0.4 mg, Oral, Every 5 Minutes PRN    pantoprazole (PROTONIX) 40 MG EC tablet 1 tablet, 2 Times Daily    vitamin B-6 (PYRIDOXINE) 100 mg, Daily    Vitamin C 500 mg, Daily       Objective     /62 (BP Location: Left arm, Patient Position: Sitting, Cuff Size: Adult)   Pulse 60   Ht 185.4 cm (72.99\")   Wt 80.4 kg (177 lb 3.2 oz)   SpO2 96%   BMI 23.38 kg/m²       Wt Readings from Last 3 Encounters:   03/24/25 80.4 kg (177 lb 3.2 oz)   02/17/25 77.7 kg (171 lb 4.8 oz)   01/25/25 78.9 kg (173 lb 15.1 oz)      Vitals reviewed.   Constitutional:       Appearance: Healthy appearance. Not in distress.   Eyes:      General: Lids are normal. No scleral icterus.     Conjunctiva/sclera: Conjunctivae normal.   HENT:      Head: Normocephalic and atraumatic.   Neck:      Vascular: No carotid bruit or JVD. JVD normal.   Pulmonary:      Effort: Pulmonary effort is normal.      Breath sounds: Normal breath sounds. No wheezing. No " rhonchi. No rales.   Chest:      Chest wall: Not tender to palpatation.   Cardiovascular:      Normal rate. Regular rhythm. Normal S1. Normal S2.       Murmurs: There is a systolic murmur at the URSB.      No gallop.  No rub.   Pulses:     Intact distal pulses.   Edema:     Peripheral edema absent.   Abdominal:      General: Bowel sounds are normal. There is no distension.      Palpations: Abdomen is soft.   Skin:     General: Skin is warm and dry.   Neurological:      General: No focal deficit present.      Mental Status: Alert, oriented to person, place, and time and oriented to person, place and time.   Psychiatric:         Attention and Perception: Attention normal.         Mood and Affect: Mood normal.         Behavior: Behavior normal. Behavior is cooperative.       Result Review  (reviewed with patient):        Lab Results   Component Value Date    GLUCOSE 90 01/27/2025    BUN 16 02/12/2025    CREATININE 0.9 02/12/2025     02/12/2025    K 4.2 02/12/2025     02/12/2025    CALCIUM 9.5 02/12/2025    PROTEINTOT 6.5 02/06/2025    ALBUMIN 4.4 02/06/2025    ALT 13 02/06/2025    AST 20 02/06/2025    ALKPHOS 80 02/06/2025    BILITOT 1.0 02/06/2025    GLOB 2.0 01/25/2025    AGRATIO 2.0 01/25/2025    BCR 18 02/12/2025    ANIONGAP 10 02/12/2025    EGFR 87.5 01/27/2025     Lab Results   Component Value Date    WBC 8.1 02/12/2025    HGB 13.2 (L) 02/12/2025    HCT 38.3 02/12/2025    MCV 86.1 02/12/2025     02/12/2025     Lab Results   Component Value Date    CHOL 111 01/26/2025    CHLPL 138 06/15/2020    TRIG 96 01/26/2025    HDL 36 (L) 01/26/2025    LDL 57 01/26/2025     Lab Results   Component Value Date    HGBA1C 4.80 02/17/2025       Assessment     Diagnoses and all orders for this visit:    1. Essential hypertension (Primary)  Overview:  Target blood pressure <130/80 mmHg    Assessment & Plan:  Well-controlled on current regimen      2. NSTEMI (non-ST elevated myocardial infarction)  -      clopidogrel (PLAVIX) 75 MG tablet; Take 1 tablet by mouth Daily.  Dispense: 90 tablet; Refill: 3    3. Hyperlipidemia LDL goal <70  Assessment & Plan:  LDL 5, 1/2025  Continue atorvastatin      4. Coronary artery disease involving native coronary artery of native heart without angina pectoris  Overview:  CABG in the 1980s  PCI 2006 - 2007  Cardiac catheterization (9/6/2024): Occluded native circulation.  Patent LIMA to LAD.  Severely degenerated SVG to RCA and SVG to OM  Cardiac catheterization (9/16/2024): Successful high risk PCI of degenerated sequential SVG to RPDA/RPL--proximal and distal segments--using large-caliber drug-eluting stents.  Residual stenosis of vein graft supplying RPL not treated. Successful high risk PCI of degenerated SVG to OM with large caliber YAMILETH  Cardiac catheterization for NSTEMI (1/27/2025): Severe in-stent restenosis of SVG to RCA status post cutting balloon angioplasty alone.  SVG to OM stent patent.  Staged laser atherectomy and drug-coated balloon angioplasty of SVG to RCA, 2/17/2025    Assessment & Plan:  Angina resolved post PCI  He declines cardiac rehab.  Encouraged to participate.  He will call should he decide to enroll.  Continue DAPT, beta-blocker, and statin  Continue isosorbide.  He will call should recurrent symptoms develop.        Plan   Continue GDMT.  Plavix refilled today.  Currently declines cardiac rehab.  He will call should he change his mind.  He will call should he experience recurrent symptoms.    Follow-up   Return in about 1 year (around 3/24/2026) for [facility] in [place] on a Friday.      Mayuri Charlton, APRN   3/24/2025

## 2025-03-24 NOTE — ASSESSMENT & PLAN NOTE
Angina resolved post PCI  He declines cardiac rehab.  Encouraged to participate.  He will call should he decide to enroll.  Continue DAPT, beta-blocker, and statin  Continue isosorbide.  He will call should recurrent symptoms develop.

## 2025-04-09 ENCOUNTER — TELEPHONE (OUTPATIENT)
Dept: CARDIOLOGY | Facility: CLINIC | Age: 85
End: 2025-04-09
Payer: MEDICARE

## 2025-04-09 NOTE — TELEPHONE ENCOUNTER
Received fax request to hold plavix for 5 days prior to an epidural steroid injection with SEFERINO Tenorio at [facility]. Requested response faxed [phone] Attn Jesus Alberto De La Rosa.    Please advise.    The patient was hospitalized in January 2025 for NSTEMI. He underwent cardiac catheterization that showed severe in-stent restenosis of a saphenous vein graft to his right coronary artery that was treated with Cutting Balloon angioplasty. A previously placed stent in the SVG to the obtuse marginal was patent. He returned 2/2025 for a staged intervention using drug-coated balloon and repeat angioplasty of the SVG to the right coronary artery.

## 2025-05-19 ENCOUNTER — DOCUMENTATION (OUTPATIENT)
Dept: CARDIOLOGY | Facility: CLINIC | Age: 85
End: 2025-05-19
Payer: MEDICARE

## 2025-05-19 ENCOUNTER — TELEPHONE (OUTPATIENT)
Dept: CARDIOLOGY | Facility: CLINIC | Age: 85
End: 2025-05-19
Payer: MEDICARE

## 2025-05-19 ENCOUNTER — HOSPITAL ENCOUNTER (INPATIENT)
Facility: HOSPITAL | Age: 85
LOS: 2 days | Discharge: HOME | DRG: 324 | End: 2025-05-21
Attending: INTERNAL MEDICINE | Admitting: INTERNAL MEDICINE
Payer: MEDICARE

## 2025-05-19 DIAGNOSIS — I25.119 CORONARY ARTERY DISEASE INVOLVING NATIVE CORONARY ARTERY OF NATIVE HEART WITH ANGINA PECTORIS: Primary | ICD-10-CM

## 2025-05-19 PROBLEM — I77.9 CAROTID ARTERY DISEASE: Status: ACTIVE | Noted: 2023-07-11

## 2025-05-19 PROBLEM — I21.4 NSTEMI (NON-ST ELEVATED MYOCARDIAL INFARCTION): Status: ACTIVE | Noted: 2025-05-19

## 2025-05-19 LAB
ALBUMIN SERPL-MCNC: 4.2 G/DL (ref 3.5–5.2)
ALBUMIN/GLOB SERPL: 1.9 G/DL
ALP SERPL-CCNC: 99 U/L (ref 39–117)
ALT SERPL W P-5'-P-CCNC: 15 U/L (ref 1–41)
ANION GAP SERPL CALCULATED.3IONS-SCNC: 10 MMOL/L (ref 5–15)
AST SERPL-CCNC: 23 U/L (ref 1–40)
BASOPHILS # BLD AUTO: 0.01 10*3/MM3 (ref 0–0.2)
BASOPHILS NFR BLD AUTO: 0.1 % (ref 0–1.5)
BILIRUB SERPL-MCNC: 1.6 MG/DL (ref 0–1.2)
BUN SERPL-MCNC: 13 MG/DL (ref 8–23)
BUN/CREAT SERPL: 14 (ref 7–25)
CALCIUM SPEC-SCNC: 9.3 MG/DL (ref 8.6–10.5)
CHLORIDE SERPL-SCNC: 104 MMOL/L (ref 98–107)
CHOLEST SERPL-MCNC: 115 MG/DL (ref 0–200)
CO2 SERPL-SCNC: 27 MMOL/L (ref 22–29)
CREAT SERPL-MCNC: 0.93 MG/DL (ref 0.76–1.27)
DEPRECATED RDW RBC AUTO: 47.4 FL (ref 37–54)
EGFRCR SERPLBLD CKD-EPI 2021: 80 ML/MIN/1.73
EOSINOPHIL # BLD AUTO: 0.14 10*3/MM3 (ref 0–0.4)
EOSINOPHIL NFR BLD AUTO: 1.9 % (ref 0.3–6.2)
ERYTHROCYTE [DISTWIDTH] IN BLOOD BY AUTOMATED COUNT: 14.9 % (ref 12.3–15.4)
GEN 5 1HR TROPONIN T REFLEX: 32 NG/L
GLOBULIN UR ELPH-MCNC: 2.2 GM/DL
GLUCOSE SERPL-MCNC: 96 MG/DL (ref 65–99)
HBA1C MFR BLD: 5.1 % (ref 4.8–5.6)
HCT VFR BLD AUTO: 41.4 % (ref 37.5–51)
HDLC SERPL-MCNC: 33 MG/DL (ref 40–60)
HGB BLD-MCNC: 12.9 G/DL (ref 13–17.7)
IMM GRANULOCYTES # BLD AUTO: 0.02 10*3/MM3 (ref 0–0.05)
IMM GRANULOCYTES NFR BLD AUTO: 0.3 % (ref 0–0.5)
LDLC SERPL CALC-MCNC: 66 MG/DL (ref 0–100)
LDLC/HDLC SERPL: 2 {RATIO}
LYMPHOCYTES # BLD AUTO: 0.84 10*3/MM3 (ref 0.7–3.1)
LYMPHOCYTES NFR BLD AUTO: 11.3 % (ref 19.6–45.3)
MAGNESIUM SERPL-MCNC: 1.9 MG/DL (ref 1.6–2.4)
MCH RBC QN AUTO: 27 PG (ref 26.6–33)
MCHC RBC AUTO-ENTMCNC: 31.2 G/DL (ref 31.5–35.7)
MCV RBC AUTO: 86.6 FL (ref 79–97)
MONOCYTES # BLD AUTO: 0.61 10*3/MM3 (ref 0.1–0.9)
MONOCYTES NFR BLD AUTO: 8.2 % (ref 5–12)
NEUTROPHILS NFR BLD AUTO: 5.8 10*3/MM3 (ref 1.7–7)
NEUTROPHILS NFR BLD AUTO: 78.2 % (ref 42.7–76)
NRBC BLD AUTO-RTO: 0 /100 WBC (ref 0–0.2)
PLATELET # BLD AUTO: 180 10*3/MM3 (ref 140–450)
PMV BLD AUTO: 10.3 FL (ref 6–12)
POTASSIUM SERPL-SCNC: 4.3 MMOL/L (ref 3.5–5.2)
PROT SERPL-MCNC: 6.4 G/DL (ref 6–8.5)
RBC # BLD AUTO: 4.78 10*6/MM3 (ref 4.14–5.8)
SODIUM SERPL-SCNC: 141 MMOL/L (ref 136–145)
TRIGL SERPL-MCNC: 80 MG/DL (ref 0–150)
TROPONIN T % DELTA: 0
TROPONIN T NUMERIC DELTA: 0 NG/L
TROPONIN T SERPL HS-MCNC: 32 NG/L
VLDLC SERPL-MCNC: 16 MG/DL (ref 5–40)
WBC NRBC COR # BLD AUTO: 7.42 10*3/MM3 (ref 3.4–10.8)

## 2025-05-19 PROCEDURE — 93010 ELECTROCARDIOGRAM REPORT: CPT | Performed by: INTERNAL MEDICINE

## 2025-05-19 PROCEDURE — 80061 LIPID PANEL: CPT | Performed by: INTERNAL MEDICINE

## 2025-05-19 PROCEDURE — 83036 HEMOGLOBIN GLYCOSYLATED A1C: CPT | Performed by: INTERNAL MEDICINE

## 2025-05-19 PROCEDURE — 84484 ASSAY OF TROPONIN QUANT: CPT | Performed by: NURSE PRACTITIONER

## 2025-05-19 PROCEDURE — 93005 ELECTROCARDIOGRAM TRACING: CPT | Performed by: INTERNAL MEDICINE

## 2025-05-19 PROCEDURE — 99223 1ST HOSP IP/OBS HIGH 75: CPT | Performed by: INTERNAL MEDICINE

## 2025-05-19 PROCEDURE — 85025 COMPLETE CBC W/AUTO DIFF WBC: CPT | Performed by: INTERNAL MEDICINE

## 2025-05-19 PROCEDURE — 80053 COMPREHEN METABOLIC PANEL: CPT | Performed by: INTERNAL MEDICINE

## 2025-05-19 PROCEDURE — 83735 ASSAY OF MAGNESIUM: CPT | Performed by: INTERNAL MEDICINE

## 2025-05-19 RX ORDER — LOSARTAN POTASSIUM 25 MG/1
25 TABLET ORAL DAILY
Status: DISCONTINUED | OUTPATIENT
Start: 2025-05-20 | End: 2025-05-22 | Stop reason: HOSPADM

## 2025-05-19 RX ORDER — NITROGLYCERIN 0.4 MG/1
0.4 TABLET SUBLINGUAL
Status: DISCONTINUED | OUTPATIENT
Start: 2025-05-19 | End: 2025-05-19

## 2025-05-19 RX ORDER — ATORVASTATIN CALCIUM 20 MG/1
20 TABLET, FILM COATED ORAL NIGHTLY
Status: DISCONTINUED | OUTPATIENT
Start: 2025-05-19 | End: 2025-05-19

## 2025-05-19 RX ORDER — SODIUM CHLORIDE 0.9 % (FLUSH) 0.9 %
10 SYRINGE (ML) INJECTION AS NEEDED
Status: DISCONTINUED | OUTPATIENT
Start: 2025-05-19 | End: 2025-05-22 | Stop reason: HOSPADM

## 2025-05-19 RX ORDER — ACETAMINOPHEN 325 MG/1
650 TABLET ORAL EVERY 4 HOURS PRN
Status: DISCONTINUED | OUTPATIENT
Start: 2025-05-19 | End: 2025-05-22 | Stop reason: HOSPADM

## 2025-05-19 RX ORDER — ASPIRIN 81 MG/1
81 TABLET ORAL DAILY
Status: DISCONTINUED | OUTPATIENT
Start: 2025-05-19 | End: 2025-05-19

## 2025-05-19 RX ORDER — CLOPIDOGREL BISULFATE 75 MG/1
75 TABLET ORAL DAILY
Status: DISCONTINUED | OUTPATIENT
Start: 2025-05-19 | End: 2025-05-19

## 2025-05-19 RX ORDER — SODIUM CHLORIDE 9 MG/ML
40 INJECTION, SOLUTION INTRAVENOUS AS NEEDED
Status: DISCONTINUED | OUTPATIENT
Start: 2025-05-19 | End: 2025-05-22 | Stop reason: HOSPADM

## 2025-05-19 RX ORDER — ASPIRIN 81 MG/1
81 TABLET ORAL DAILY
Status: DISCONTINUED | OUTPATIENT
Start: 2025-05-20 | End: 2025-05-22 | Stop reason: HOSPADM

## 2025-05-19 RX ORDER — BISACODYL 5 MG/1
5 TABLET, DELAYED RELEASE ORAL DAILY PRN
Status: DISCONTINUED | OUTPATIENT
Start: 2025-05-19 | End: 2025-05-22 | Stop reason: HOSPADM

## 2025-05-19 RX ORDER — LANOLIN ALCOHOL/MO/W.PET/CERES
500 CREAM (GRAM) TOPICAL DAILY
Status: DISCONTINUED | OUTPATIENT
Start: 2025-05-19 | End: 2025-05-22 | Stop reason: HOSPADM

## 2025-05-19 RX ORDER — METOPROLOL TARTRATE 25 MG/1
25 TABLET, FILM COATED ORAL 2 TIMES DAILY
Status: DISCONTINUED | OUTPATIENT
Start: 2025-05-19 | End: 2025-05-20

## 2025-05-19 RX ORDER — CHOLECALCIFEROL (VITAMIN D3) 25 MCG
1000 TABLET ORAL DAILY
Status: DISCONTINUED | OUTPATIENT
Start: 2025-05-19 | End: 2025-05-22 | Stop reason: HOSPADM

## 2025-05-19 RX ORDER — LOSARTAN POTASSIUM 25 MG/1
25 TABLET ORAL DAILY
Status: DISCONTINUED | OUTPATIENT
Start: 2025-05-19 | End: 2025-05-19

## 2025-05-19 RX ORDER — GABAPENTIN 400 MG/1
800 CAPSULE ORAL NIGHTLY
Status: DISCONTINUED | OUTPATIENT
Start: 2025-05-19 | End: 2025-05-22 | Stop reason: HOSPADM

## 2025-05-19 RX ORDER — PANTOPRAZOLE SODIUM 40 MG/1
40 TABLET, DELAYED RELEASE ORAL 2 TIMES DAILY
Status: DISCONTINUED | OUTPATIENT
Start: 2025-05-19 | End: 2025-05-22 | Stop reason: HOSPADM

## 2025-05-19 RX ORDER — NITROGLYCERIN 0.4 MG/1
0.4 TABLET SUBLINGUAL
Status: DISCONTINUED | OUTPATIENT
Start: 2025-05-19 | End: 2025-05-22 | Stop reason: HOSPADM

## 2025-05-19 RX ORDER — SODIUM CHLORIDE 0.9 % (FLUSH) 0.9 %
10 SYRINGE (ML) INJECTION EVERY 12 HOURS SCHEDULED
Status: DISCONTINUED | OUTPATIENT
Start: 2025-05-19 | End: 2025-05-22 | Stop reason: HOSPADM

## 2025-05-19 RX ORDER — ATORVASTATIN CALCIUM 20 MG/1
20 TABLET, FILM COATED ORAL NIGHTLY
Status: DISCONTINUED | OUTPATIENT
Start: 2025-05-20 | End: 2025-05-22 | Stop reason: HOSPADM

## 2025-05-19 RX ORDER — AMOXICILLIN 250 MG
2 CAPSULE ORAL 2 TIMES DAILY
Status: DISCONTINUED | OUTPATIENT
Start: 2025-05-19 | End: 2025-05-22 | Stop reason: HOSPADM

## 2025-05-19 RX ORDER — LANOLIN ALCOHOL/MO/W.PET/CERES
100 CREAM (GRAM) TOPICAL DAILY
Status: DISCONTINUED | OUTPATIENT
Start: 2025-05-19 | End: 2025-05-22 | Stop reason: HOSPADM

## 2025-05-19 RX ORDER — HYDROCODONE BITARTRATE AND ACETAMINOPHEN 7.5; 325 MG/1; MG/1
1 TABLET ORAL EVERY 4 HOURS PRN
Refills: 0 | Status: DISCONTINUED | OUTPATIENT
Start: 2025-05-19 | End: 2025-05-22 | Stop reason: HOSPADM

## 2025-05-19 RX ORDER — ISOSORBIDE MONONITRATE 60 MG/1
60 TABLET, EXTENDED RELEASE ORAL DAILY
Status: DISCONTINUED | OUTPATIENT
Start: 2025-05-19 | End: 2025-05-22 | Stop reason: HOSPADM

## 2025-05-19 RX ORDER — FOLIC ACID 0.4 MG
400 TABLET ORAL DAILY
Status: DISCONTINUED | OUTPATIENT
Start: 2025-05-19 | End: 2025-05-22 | Stop reason: HOSPADM

## 2025-05-19 RX ORDER — POLYETHYLENE GLYCOL 3350 17 G/17G
17 POWDER, FOR SOLUTION ORAL DAILY PRN
Status: DISCONTINUED | OUTPATIENT
Start: 2025-05-19 | End: 2025-05-22 | Stop reason: HOSPADM

## 2025-05-19 RX ORDER — CLOPIDOGREL BISULFATE 75 MG/1
75 TABLET ORAL DAILY
Status: DISCONTINUED | OUTPATIENT
Start: 2025-05-20 | End: 2025-05-22 | Stop reason: HOSPADM

## 2025-05-19 RX ORDER — BISACODYL 10 MG
10 SUPPOSITORY, RECTAL RECTAL DAILY PRN
Status: DISCONTINUED | OUTPATIENT
Start: 2025-05-19 | End: 2025-05-22 | Stop reason: HOSPADM

## 2025-05-19 RX ADMIN — METOPROLOL TARTRATE 25 MG: 25 TABLET, FILM COATED ORAL at 21:36

## 2025-05-19 RX ADMIN — ISOSORBIDE MONONITRATE 60 MG: 60 TABLET, EXTENDED RELEASE ORAL at 14:48

## 2025-05-19 RX ADMIN — CYANOCOBALAMIN TAB 1000 MCG 500 MCG: 1000 TAB at 14:46

## 2025-05-19 RX ADMIN — FOLIC ACID TAB 400 MCG 400 MCG: 400 TAB at 14:48

## 2025-05-19 RX ADMIN — Medication 1000 UNITS: at 14:49

## 2025-05-19 RX ADMIN — PYRIDOXINE HCL TAB 50 MG 100 MG: 50 TAB at 15:07

## 2025-05-19 RX ADMIN — GABAPENTIN 800 MG: 400 CAPSULE ORAL at 21:32

## 2025-05-19 NOTE — Clinical Note
12 cycles for 18 seconds duration;   120 total pulses delivered;   Maximum Pressure achieved was 4 pako and

## 2025-05-19 NOTE — Clinical Note
Balloon inserted in saphenous vein graft. Thank you for choosing [facility]. It was a pleasure to see you for your office visit today.     To reach our Specialty Clinic: [phone]  To reach our Imaging scheduler: [phone]      If you had any blood work, imaging or other tests:  Normal test results will be mailed to your home address in a letter  Abnormal results will be communicated to you via phone call/letter  Please allow up to 1-2 weeks for processing/interpretation of most lab work  If you have questions or concerns call our clinic at [phone]

## 2025-05-19 NOTE — Clinical Note
Hemostasis started on the right radial artery. R-Band was used in achieving hemostasis. Radial compression device applied to vessel. Hemostasis achieved successfully. Closure device additional comment: 12 cc of air in TR band

## 2025-05-19 NOTE — H&P
[facility] at [facility]  Cardiovascular History and Physical Note    Chief complaint: Chest pain    Active Hospital Problems    Diagnosis  POA    **NSTEMI (non-ST elevated myocardial infarction) [I21.4]  Yes     Priority: High    Coronary artery disease involving native coronary artery of native heart with angina pectoris [I25.119]  Yes     Priority: High     CABG in the 1980s  PCI 2006 - 2007  Cardiac catheterization (9/6/2024): Occluded native circulation.  Patent LIMA to LAD.  Severely degenerated SVG to RCA and SVG to OM  Cardiac catheterization (9/16/2024): Successful high risk PCI of degenerated sequential SVG to RPDA/RPL--proximal and distal segments--using large-caliber drug-eluting stents.  Residual stenosis of vein graft supplying RPL not treated. Successful high risk PCI of degenerated SVG to OM with large caliber YAMILETH  Cardiac catheterization for NSTEMI (1/27/2025): Severe in-stent restenosis of SVG to RCA status post cutting balloon angioplasty alone.  SVG to OM stent patent.  Staged laser atherectomy and drug-coated balloon angioplasty of SVG to RCA, 2/17/2025      Heart failure with preserved left ventricular function (HFpEF) [I50.30]  Yes     Priority: Medium     Echo (12/22/2024): LVEF 53%      Essential hypertension [I10]  Yes     Target blood pressure <130/80 mmHg      Hyperlipidemia LDL goal <70 [E78.5]  Yes            84-year-old gentleman with CAD post CABG in the 1980s.  Last fall, the patient developed medically refractory angina and was found to have severe stenoses of SVG to OM and SVG to RPDA/RPL.  He underwent PCI to both vein grafts with resolution of symptoms.  In January he developed recurrent anginal symptoms and underwent repeat catheterization which revealed in-stent restenosis in the vein graft to the RCA and underwent balloon angioplasty alone.  Stent in SVG to OM was widely patent.  Shortly thereafter, he returned to the Cath Lab for laser atherectomy and  drug-coated balloon angioplasty once it was available.    The patient had resolution of anginal symptoms until 2 days ago.  The patient started to have substernal chest discomfort responsive to sublingual nitroglycerin.  He became nervous and presented to [facility].  The reportedly had elevated troponin but no paperwork is available for review.  Our service was contacted for transfer and the patient was flown to [facility] this afternoon.    The patient is having no chest pain symptoms and reports not having chest pain since initially presenting to [facility].    Cardiac risk factors: Known CAD, hypertension, hyperlipidemia    Past medical and surgical history, social and family history reviewed in EMR.    REVIEW OF SYSTEMS:   H&P ROS reviewed and pertinent CV ROS as noted in HPI.         Vital Sign Min/Max for last 24 hours  No data recorded   BP  Min: 117/90  Max: 117/90   Pulse  Min: 54  Max: 54   No data recorded   No data recorded   No data recorded    No intake or output data in the 24 hours ending 05/19/25 1653        Pulmonary:      Effort: Pulmonary effort is normal.   Cardiovascular:      Normal rate. Regular rhythm.   Skin:     General: Skin is cool.   Neurological:      General: No focal deficit present.          Lab Review:   Labs reviewed in the electronic medical record.  Pertinent findings include:  Lab Results   Component Value Date    GLUCOSE 96 05/19/2025    BUN 13 05/19/2025    CREATININE 0.93 05/19/2025     05/19/2025    K 4.3 05/19/2025     05/19/2025    CALCIUM 9.3 05/19/2025    PROTEINTOT 6.4 05/19/2025    ALBUMIN 4.2 05/19/2025    ALT 15 05/19/2025    AST 23 05/19/2025    ALKPHOS 99 05/19/2025    BILITOT 1.6 (H) 05/19/2025    GLOB 2.2 05/19/2025    AGRATIO 1.9 05/19/2025    BCR 14.0 05/19/2025    ANIONGAP 10.0 05/19/2025    EGFR 80.0 05/19/2025     Lab Results   Component Value Date    WBC 7.42 05/19/2025    HGB 12.9 (L) 05/19/2025    HCT 41.4 05/19/2025    MCV 86.6  05/19/2025     05/19/2025     Lab Results   Component Value Date    CHOL 115 05/19/2025    CHLPL 138 06/15/2020    TRIG 80 05/19/2025    HDL 33 (L) 05/19/2025    LDL 66 05/19/2025     Results from last 7 days   Lab Units 05/19/25  1512   HEMOGLOBIN A1C % 5.10     Lab Results   Component Value Date    TROPONINI <0.200 08/27/2016    TROPONINT 32 (H) 05/19/2025                CAD with NSTEMI  Crescendo angina with reportedly elevated troponin at OSH  Patient presently chest pain-free  Recommend relook coronary angiography.  Restenosis of SVG to RCA graft suspected  Discussed case in depth with Dr. Parviz Koenig who will perform procedure tomorrow.  Patient daughter, Comfort, also aware of plan  Continue DAPT, heparin    Chronic HFpEF    Hypertension  Controlled    Hyperlipidemia  Resume atorvastatin as taking at home             N.p.o. after light breakfast tomorrow  Continue DAPT, IV heparin    H. C. John Cruz MD [facility], [facility]  Interventional and General Cardiology

## 2025-05-19 NOTE — TELEPHONE ENCOUNTER
Overhead page for patient problem.  Spoke with patients daughter, Josefa.  She reports patient has been having chest pain since last Thursday. SL NTG has relieved it.  This is associated with shortness of breath.  They took him last night to [facility] in [place].  They were told his enzymes were initially normal, then elevated to 84.  They kept him overnight.  His blood pressure was 144/77 with O2 sats in the low 90s.  [facility] kept him overnight.  She reports patient has been having a lot of back pain and is scheduled for injections in a couple of weeks.  She was calling to get a sooner appointment.  Last OV 3/24/25 with ABDIAZIZ Schwarz  Last LHC 2/17/15 with staged PCI after LHC 1/27/25 with PCI in the setting on NSTEMI.  Last Echo 12/2024  I will send information to Talia and BETY.

## 2025-05-19 NOTE — PLAN OF CARE
oal Outcome Evaluation:    Arrived to [facility] S[facility] via helicopter. Pain 0/10

## 2025-05-19 NOTE — TELEPHONE ENCOUNTER
I spoke with patients wife. She reports he is still in the hospital in [place] and she was headed there to visit him. Advised her to have his D/C records sent to our office. She verbalized understanding.

## 2025-05-19 NOTE — Clinical Note
First balloon inflation max pressure = 14 pako. First balloon inflation duration = 25 seconds. Second inflation of balloon - Max pressure = 16 pako. 2nd Inflation of balloon - Duration = 20 seconds. 2nd inflation was done at 14:25 EDT.

## 2025-05-19 NOTE — Clinical Note
A 6 fr sheath was successfully inserted with ultrasound guidance into the left radial artery. Sheath insertion not delayed.

## 2025-05-19 NOTE — Clinical Note
First balloon inflation max pressure = 6 pako. First balloon inflation duration = 10 seconds. Second inflation of balloon - Max pressure = 12 pako. 2nd Inflation of balloon - Duration = 12 seconds. 2nd inflation was done at 14:30 EDT. Third inflation of balloon - Max pressure = 14 pako. 3rd Inflation of balloon - Duration = 10 seconds. 3rd inflation was done at 14:31 EDT. Fourth inflation of balloon - Max pressure = 10 pako. 4th Inflation o f balloon - Duration = 10 seconds.

## 2025-05-20 LAB
ACT BLD: 239 SECONDS (ref 82–152)
ACT BLD: 251 SECONDS (ref 82–152)
ACT BLD: 256 SECONDS (ref 82–152)
ANION GAP SERPL CALCULATED.3IONS-SCNC: 10 MMOL/L (ref 5–15)
BUN SERPL-MCNC: 15 MG/DL (ref 8–23)
BUN/CREAT SERPL: 18.8 (ref 7–25)
CALCIUM SPEC-SCNC: 9.1 MG/DL (ref 8.6–10.5)
CHLORIDE SERPL-SCNC: 102 MMOL/L (ref 98–107)
CO2 SERPL-SCNC: 24 MMOL/L (ref 22–29)
CREAT SERPL-MCNC: 0.8 MG/DL (ref 0.76–1.27)
EGFRCR SERPLBLD CKD-EPI 2021: 86.2 ML/MIN/1.73
GLUCOSE SERPL-MCNC: 128 MG/DL (ref 65–99)
MAGNESIUM SERPL-MCNC: 1.8 MG/DL (ref 1.6–2.4)
POTASSIUM SERPL-SCNC: 3.5 MMOL/L (ref 3.5–5.2)
POTASSIUM SERPL-SCNC: 4.4 MMOL/L (ref 3.5–5.2)
QT INTERVAL: 0 MS
QTC INTERVAL: 0 MS
SODIUM SERPL-SCNC: 136 MMOL/L (ref 136–145)

## 2025-05-20 PROCEDURE — C1769 GUIDE WIRE: HCPCS | Performed by: INTERNAL MEDICINE

## 2025-05-20 PROCEDURE — B2111ZZ FLUOROSCOPY OF MULTIPLE CORONARY ARTERIES USING LOW OSMOLAR CONTRAST: ICD-10-PCS | Performed by: INTERNAL MEDICINE

## 2025-05-20 PROCEDURE — 92978 ENDOLUMINL IVUS OCT C 1ST: CPT | Performed by: INTERNAL MEDICINE

## 2025-05-20 PROCEDURE — 25810000003 SODIUM CHLORIDE 0.9 % SOLUTION: Performed by: INTERNAL MEDICINE

## 2025-05-20 PROCEDURE — C1887 CATHETER, GUIDING: HCPCS | Performed by: INTERNAL MEDICINE

## 2025-05-20 PROCEDURE — 84132 ASSAY OF SERUM POTASSIUM: CPT | Performed by: INTERNAL MEDICINE

## 2025-05-20 PROCEDURE — 99232 SBSQ HOSP IP/OBS MODERATE 35: CPT | Performed by: INTERNAL MEDICINE

## 2025-05-20 PROCEDURE — 25010000002 MAGNESIUM SULFATE 4 GM/100ML SOLUTION: Performed by: INTERNAL MEDICINE

## 2025-05-20 PROCEDURE — 25010000002 LIDOCAINE PF 1% 1 % SOLUTION: Performed by: INTERNAL MEDICINE

## 2025-05-20 PROCEDURE — 92928 PRQ TCAT PLMT NTRAC ST 1 LES: CPT | Performed by: INTERNAL MEDICINE

## 2025-05-20 PROCEDURE — 027034Z DILATION OF CORONARY ARTERY, ONE ARTERY WITH DRUG-ELUTING INTRALUMINAL DEVICE, PERCUTANEOUS APPROACH: ICD-10-PCS | Performed by: INTERNAL MEDICINE

## 2025-05-20 PROCEDURE — 25510000001 IOPAMIDOL PER 1 ML: Performed by: INTERNAL MEDICINE

## 2025-05-20 PROCEDURE — 93010 ELECTROCARDIOGRAM REPORT: CPT | Performed by: INTERNAL MEDICINE

## 2025-05-20 PROCEDURE — C1753 CATH, INTRAVAS ULTRASOUND: HCPCS | Performed by: INTERNAL MEDICINE

## 2025-05-20 PROCEDURE — C9600 PERC DRUG-EL COR STENT SING: HCPCS | Performed by: INTERNAL MEDICINE

## 2025-05-20 PROCEDURE — 85347 COAGULATION TIME ACTIVATED: CPT

## 2025-05-20 PROCEDURE — 93005 ELECTROCARDIOGRAM TRACING: CPT | Performed by: INTERNAL MEDICINE

## 2025-05-20 PROCEDURE — 25010000002 FENTANYL CITRATE (PF) 50 MCG/ML SOLUTION: Performed by: INTERNAL MEDICINE

## 2025-05-20 PROCEDURE — 97161 PT EVAL LOW COMPLEX 20 MIN: CPT

## 2025-05-20 PROCEDURE — B240ZZ3 ULTRASONOGRAPHY OF SINGLE CORONARY ARTERY, INTRAVASCULAR: ICD-10-PCS | Performed by: INTERNAL MEDICINE

## 2025-05-20 PROCEDURE — C1894 INTRO/SHEATH, NON-LASER: HCPCS | Performed by: INTERNAL MEDICINE

## 2025-05-20 PROCEDURE — 93455 CORONARY ART/GRFT ANGIO S&I: CPT | Performed by: INTERNAL MEDICINE

## 2025-05-20 PROCEDURE — C1874 STENT, COATED/COV W/DEL SYS: HCPCS | Performed by: INTERNAL MEDICINE

## 2025-05-20 PROCEDURE — C1725 CATH, TRANSLUMIN NON-LASER: HCPCS | Performed by: INTERNAL MEDICINE

## 2025-05-20 PROCEDURE — B2131ZZ FLUOROSCOPY OF MULTIPLE CORONARY ARTERY BYPASS GRAFTS USING LOW OSMOLAR CONTRAST: ICD-10-PCS | Performed by: INTERNAL MEDICINE

## 2025-05-20 PROCEDURE — 83735 ASSAY OF MAGNESIUM: CPT | Performed by: INTERNAL MEDICINE

## 2025-05-20 PROCEDURE — B2181ZZ FLUOROSCOPY OF LEFT INTERNAL MAMMARY BYPASS GRAFT USING LOW OSMOLAR CONTRAST: ICD-10-PCS | Performed by: INTERNAL MEDICINE

## 2025-05-20 PROCEDURE — 25010000002 MIDAZOLAM PER 1 MG: Performed by: INTERNAL MEDICINE

## 2025-05-20 PROCEDURE — 25010000002 NICARDIPINE 2.5 MG/ML SOLUTION: Performed by: INTERNAL MEDICINE

## 2025-05-20 PROCEDURE — 25010000002 HEPARIN (PORCINE) PER 1000 UNITS: Performed by: INTERNAL MEDICINE

## 2025-05-20 PROCEDURE — 80048 BASIC METABOLIC PNL TOTAL CA: CPT | Performed by: INTERNAL MEDICINE

## 2025-05-20 DEVICE — XIENCE SKYPOINT™ EVEROLIMUS ELUTING CORONARY STENT SYSTEM 2.50 MM X 23 MM / RAPID-EXCHANGE
Type: IMPLANTABLE DEVICE | Site: CORONARY | Status: FUNCTIONAL
Brand: XIENCE SKYPOINT™

## 2025-05-20 RX ORDER — POTASSIUM CHLORIDE 1500 MG/1
20 TABLET, EXTENDED RELEASE ORAL ONCE
Status: COMPLETED | OUTPATIENT
Start: 2025-05-20 | End: 2025-05-20

## 2025-05-20 RX ORDER — LIDOCAINE HYDROCHLORIDE 10 MG/ML
INJECTION, SOLUTION EPIDURAL; INFILTRATION; INTRACAUDAL; PERINEURAL
Status: DISCONTINUED | OUTPATIENT
Start: 2025-05-20 | End: 2025-05-20 | Stop reason: HOSPADM

## 2025-05-20 RX ORDER — IOPAMIDOL 755 MG/ML
INJECTION, SOLUTION INTRAVASCULAR
Status: DISCONTINUED | OUTPATIENT
Start: 2025-05-20 | End: 2025-05-20 | Stop reason: HOSPADM

## 2025-05-20 RX ORDER — HEPARIN SODIUM 1000 [USP'U]/ML
INJECTION, SOLUTION INTRAVENOUS; SUBCUTANEOUS
Status: DISCONTINUED | OUTPATIENT
Start: 2025-05-20 | End: 2025-05-20 | Stop reason: HOSPADM

## 2025-05-20 RX ORDER — MAGNESIUM SULFATE HEPTAHYDRATE 40 MG/ML
4 INJECTION, SOLUTION INTRAVENOUS ONCE
Status: COMPLETED | OUTPATIENT
Start: 2025-05-20 | End: 2025-05-20

## 2025-05-20 RX ORDER — METOPROLOL SUCCINATE 50 MG/1
50 TABLET, EXTENDED RELEASE ORAL DAILY
COMMUNITY
End: 2025-05-21 | Stop reason: HOSPADM

## 2025-05-20 RX ORDER — FENTANYL CITRATE 50 UG/ML
INJECTION, SOLUTION INTRAMUSCULAR; INTRAVENOUS
Status: DISCONTINUED | OUTPATIENT
Start: 2025-05-20 | End: 2025-05-20 | Stop reason: HOSPADM

## 2025-05-20 RX ORDER — SODIUM CHLORIDE 9 MG/ML
75 INJECTION, SOLUTION INTRAVENOUS CONTINUOUS
Status: ACTIVE | OUTPATIENT
Start: 2025-05-20 | End: 2025-05-20

## 2025-05-20 RX ORDER — MIDAZOLAM HYDROCHLORIDE 1 MG/ML
INJECTION, SOLUTION INTRAMUSCULAR; INTRAVENOUS
Status: DISCONTINUED | OUTPATIENT
Start: 2025-05-20 | End: 2025-05-20 | Stop reason: HOSPADM

## 2025-05-20 RX ORDER — RANOLAZINE 500 MG/1
500 TABLET, EXTENDED RELEASE ORAL 2 TIMES DAILY
Status: DISCONTINUED | OUTPATIENT
Start: 2025-05-20 | End: 2025-05-22 | Stop reason: HOSPADM

## 2025-05-20 RX ADMIN — CYANOCOBALAMIN TAB 1000 MCG 500 MCG: 1000 TAB at 08:53

## 2025-05-20 RX ADMIN — LOSARTAN POTASSIUM 25 MG: 25 TABLET, FILM COATED ORAL at 08:53

## 2025-05-20 RX ADMIN — GABAPENTIN 800 MG: 400 CAPSULE ORAL at 20:30

## 2025-05-20 RX ADMIN — ATORVASTATIN CALCIUM 20 MG: 20 TABLET, FILM COATED ORAL at 20:30

## 2025-05-20 RX ADMIN — METOPROLOL TARTRATE 25 MG: 25 TABLET, FILM COATED ORAL at 09:01

## 2025-05-20 RX ADMIN — RANOLAZINE 500 MG: 500 TABLET, FILM COATED, EXTENDED RELEASE ORAL at 20:30

## 2025-05-20 RX ADMIN — PANTOPRAZOLE SODIUM 40 MG: 40 TABLET, DELAYED RELEASE ORAL at 08:53

## 2025-05-20 RX ADMIN — SODIUM CHLORIDE 75 ML/HR: 9 INJECTION, SOLUTION INTRAVENOUS at 18:59

## 2025-05-20 RX ADMIN — Medication 1000 UNITS: at 08:54

## 2025-05-20 RX ADMIN — MAGNESIUM SULFATE IN WATER FOR 4 G: 40 INJECTION INTRAVENOUS at 19:02

## 2025-05-20 RX ADMIN — PYRIDOXINE HCL TAB 50 MG 100 MG: 50 TAB at 08:54

## 2025-05-20 RX ADMIN — SENNOSIDES AND DOCUSATE SODIUM 2 TABLET: 50; 8.6 TABLET ORAL at 20:30

## 2025-05-20 RX ADMIN — PANTOPRAZOLE SODIUM 40 MG: 40 TABLET, DELAYED RELEASE ORAL at 20:30

## 2025-05-20 RX ADMIN — ISOSORBIDE MONONITRATE 60 MG: 60 TABLET, EXTENDED RELEASE ORAL at 08:53

## 2025-05-20 RX ADMIN — SENNOSIDES AND DOCUSATE SODIUM 2 TABLET: 50; 8.6 TABLET ORAL at 08:54

## 2025-05-20 RX ADMIN — ASPIRIN 81 MG: 81 TABLET, COATED ORAL at 08:53

## 2025-05-20 RX ADMIN — POTASSIUM CHLORIDE 20 MEQ: 1500 TABLET, EXTENDED RELEASE ORAL at 12:39

## 2025-05-20 RX ADMIN — CLOPIDOGREL BISULFATE 75 MG: 75 TABLET, FILM COATED ORAL at 08:53

## 2025-05-20 RX ADMIN — Medication 10 ML: at 08:57

## 2025-05-20 RX ADMIN — FOLIC ACID TAB 400 MCG 400 MCG: 400 TAB at 08:53

## 2025-05-20 NOTE — PROGRESS NOTES
Referral received for Phase II Cardiac Rehab.  Staff reviewed chart and patient has qualifying diagnosis for Phase II Cardiac Rehab.  Staff spoke with patient in hospital on 02/17/25 and faxed Phase II Cardiac Rehab referral to [facility]. Staff will follow up with patient after heart cath.

## 2025-05-20 NOTE — PRE-PROCEDURE NOTE
Brief preprocedure note    No recurrent chest pain overnight.  Left radial pulse 2+, Barbeau type A.    The risks, benefits, and alternative options of a cardiac catheterization with possible percutaneous coronary intervention were discussed with the patient and his daughter at the bedside.  The patient is agreeable and the plan is to proceed with cardiac catheterization and possible PCI.     Heart rate high 40s while awake this morning. Intermittent lightheadedness at home with changes in position. Trial of switching metoprolol to ranolazine    Parviz Koenig MD, MSc, FACC, [facility]  Interventional Cardiology  [facility] at [facility]

## 2025-05-20 NOTE — PROGRESS NOTES
[facility]    Inpatient Progress Note      Chief Complaint/Reason for service:    Unstable angina         Subjective:       No recurrent chest pain overnight    Past medical, surgical, social and family history reviewed in the patient's electronic medical record.         Objective:      Infusions:        Medications:    Current Facility-Administered Medications:     acetaminophen (TYLENOL) tablet 650 mg, 650 mg, Oral, Q4H PRN, Niels Cruz IV, MD    [Transfer Hold] aspirin EC tablet 81 mg, 81 mg, Oral, Daily, Niels Cruz IV, MD, 81 mg at 05/20/25 0853    [Transfer Hold] atorvastatin (LIPITOR) tablet 20 mg, 20 mg, Oral, Nightly, Niels Cruz IV, MD    sennosides-docusate (PERICOLACE) 8.6-50 MG per tablet 2 tablet, 2 tablet, Oral, BID, 2 tablet at 05/20/25 0854 **AND** polyethylene glycol (MIRALAX) packet 17 g, 17 g, Oral, Daily PRN **AND** bisacodyl (DULCOLAX) EC tablet 5 mg, 5 mg, Oral, Daily PRN **AND** bisacodyl (DULCOLAX) suppository 10 mg, 10 mg, Rectal, Daily PRN, Niels Cruz IV, MD    Calcium Replacement - Follow Nurse / BPA Driven Protocol, , Not Applicable, PRN, Niels Cruz IV, MD    cholecalciferol (VITAMIN D3) tablet 1,000 Units, 1,000 Units, Oral, Daily, Niels Cruz IV, MD, 1,000 Units at 05/20/25 0854    [Transfer Hold] clopidogrel (PLAVIX) tablet 75 mg, 75 mg, Oral, Daily, Niels Cruz IV, MD, 75 mg at 05/20/25 0853    fentaNYL citrate (PF) (SUBLIMAZE) injection, , , Code / Trauma / Sedation Medication, Parviz Koenig MD, 25 mcg at 05/20/25 1445    folic acid (FOLVITE) tablet 400 mcg, 400 mcg, Oral, Daily, Niels Cruz IV, MD, 400 mcg at 05/20/25 0853    gabapentin (NEURONTIN) capsule 800 mg, 800 mg, Oral, Nightly, Niels Cruz IV, MD, 800 mg at 05/19/25 2132    heparin (porcine) injection, , , Code / Trauma / Sedation Medication, Parviz Koenig MD, 2,000  Units at 05/20/25 1453    HYDROcodone-acetaminophen (NORCO) 7.5-325 MG per tablet 1 tablet, 1 tablet, Oral, Q4H PRN, Niels Cruz IV, MD    iopamidol (ISOVUE-370) 76 % injection, , , Code / Trauma / Sedation Medication, Parviz Koenig MD, 190 mL at 05/20/25 1506    isosorbide mononitrate (IMDUR) 24 hr tablet 60 mg, 60 mg, Oral, Daily, Niels Cruz IV, MD, 60 mg at 05/20/25 0853    lidocaine PF 1% (XYLOCAINE) injection, , , Code / Trauma / Sedation Medication, Parviz Koenig MD, 5 mL at 05/20/25 1342    losartan (COZAAR) tablet 25 mg, 25 mg, Oral, Daily, Niels Cruz IV, MD, 25 mg at 05/20/25 0853    Magnesium Cardiology Dose Replacement - Follow Nurse / BPA Driven Protocol, , Not Applicable, PRN, Niels Cruz IV, MD    midazolam (VERSED) injection, , , Code / Trauma / Sedation Medication, Parviz Koenig MD, 1 mg at 05/20/25 1445    niCARdipine (CARDENE) syringe, , , Code / Trauma / Sedation Medication, Parviz Koenig MD, 200 mcg at 05/20/25 1345    nitroglycerin (NITROSTAT) SL tablet 0.4 mg, 0.4 mg, Sublingual, Q5 Min PRN, Niels Cruz IV, MD    nitroglycerin 100 mcg/mL in D5W syringe, , , Code / Trauma / Sedation Medication, Parviz Koenig MD, 200 mcg at 05/20/25 1345    pantoprazole (PROTONIX) EC tablet 40 mg, 40 mg, Oral, BID, Niels Cruz IV, MD, 40 mg at 05/20/25 0853    Phosphorus Replacement - Follow Nurse / BPA Driven Protocol, , Not Applicable, Anthony FRENCH Henry Charles T IV, MD    Potassium Replacement - Follow Nurse / BPA Driven Protocol, , Not Applicable, Anthony FRENCH Henry Charles T IV, MD    [Transfer Hold] ranolazine (RANEXA) 12 hr tablet 500 mg, 500 mg, Oral, BID, Parviz Koenig MD    sodium chloride 0.9 % flush 10 mL, 10 mL, Intravenous, Q12H, Niels Cruz IV, MD, 10 mL at 05/20/25 0857    sodium chloride 0.9 % flush 10 mL, 10 mL, Intravenous, PRN, Niels Cruz IV, MD    sodium chloride 0.9 %  infusion 40 mL, 40 mL, Intravenous, PRN, Niels Cruz IV, MD    vitamin B-12 (CYANOCOBALAMIN) tablet 500 mcg, 500 mcg, Oral, Daily, Niels Cruz IV, MD, 500 mcg at 05/20/25 0853    vitamin B-6 (PYRIDOXINE) tablet 100 mg, 100 mg, Oral, Daily, Niels Cruz IV, MD, 100 mg at 05/20/25 0854    Vital Sign Min/Max for last 24 hours  Temp  Min: 97.5 °F (36.4 °C)  Max: 97.6 °F (36.4 °C)   BP  Min: 107/66  Max: 135/92   Pulse  Min: 46  Max: 83   Resp  Min: 16  Max: 18   SpO2  Min: 92 %  Max: 97 %   No data recorded      Intake/Output Summary (Last 24 hours) at 5/20/2025 1517  Last data filed at 5/19/2025 1952  Gross per 24 hour   Intake 150 ml   Output --   Net 150 ml           CONSTITUTIONAL: No acute distress    Labs/studies:  Available lab and imaging results were reviewed by myself today    Results for orders placed during the hospital encounter of 12/21/24    Adult Transthoracic Echo Complete W/ Cont if Necessary Per Protocol    Interpretation Summary    Left ventricular ejection fraction appears to be 51 - 55%.    Left ventricular diastolic function is consistent with (grade II w/high LAP) pseudonormalization.    The right ventricular cavity is borderline dilated.    The left atrial cavity is mildly dilated.    Aortic valve maximum pressure gradient is 10 mmHg.    Estimated right ventricular systolic pressure from tricuspid regurgitation is normal (<35 mmHg). Calculated right ventricular systolic pressure from tricuspid regurgitation is 8 mmHg.             Assessment/Plan:         NSTEMI (non-ST elevated myocardial infarction)    Essential hypertension    Hyperlipidemia LDL goal <70    Coronary artery disease involving native coronary artery of native heart with angina pectoris    Heart failure with preserved left ventricular function (HFpEF)       ASSESSMENT:  Unstable angina  History of CAD status post prior CABG and PCI  [facility] 5/20/2025 revealed denovo mid LAD stenosis distal to  the LIMA anastomosis, status post YAMILETH.  LIMA was otherwise patent.  Patent sequential SVG to diagonal-OM.  80% proximal in-stent restenosis in the sequential SVG to RPDA and RPLS  Chronic HFpEF  Hypertension  Dyslipidemia    PLAN:  Status post PCI to LAD.  It was decided to not proceed with intervention of the SVG to RCA due to patient intolerance in the Cath Lab despite medication (back pain, neck pain) and approaching the safe upper limit of contrast usage.  Will discuss findings with patient, patient's family, and patient's primary cardiologist Dr. Cruz.  Will consider PCI to the RCA tomorrow.  Could consider right radial approach   Okay to eat a light breakfast tomorrow.  N.p.o. afterward for possible procedure with Dr. Cruz tomorrow  Continue current medicines      Parviz Koenig MD, MSc, FACC, [facility]  Interventional Cardiology  [facility]

## 2025-05-20 NOTE — PLAN OF CARE
Problem: Adult Inpatient Plan of Care  Goal: Plan of Care Review  Outcome: Progressing  Flowsheets (Taken 5/20/2025 0312)  Progress: no change  Outcome Evaluation: Patient is alert and oriented x4 with intermittent confusion. 2L NC overnight. No complaints of chest pain overnight. VSS. Patient has been up ambulating around the unit and in their room. Skin precautions in place. Safety precautions in place. Fall precautions in place. Will continue plan of care. Patient will be NPO following a light breakfast this morning.  Plan of Care Reviewed With: patient     Problem: Adult Inpatient Plan of Care  Goal: Plan of Care Review  Outcome: Progressing  Flowsheets (Taken 5/20/2025 0312)  Progress: no change  Outcome Evaluation: Patient is alert and oriented x4 with intermittent confusion. 2L NC overnight. No complaints of chest pain overnight. VSS. Patient has been up ambulating around the unit and in their room. Skin precautions in place. Safety precautions in place. Fall precautions in place. Will continue plan of care. Patient will be NPO following a light breakfast this morning.  Plan of Care Reviewed With: patient  Goal: Patient-Specific Goal (Individualized)  Outcome: Progressing  Goal: Absence of Hospital-Acquired Illness or Injury  Outcome: Progressing  Intervention: Identify and Manage Fall Risk  Recent Flowsheet Documentation  Taken 5/20/2025 0200 by Abebe Hopkins, RN  Safety Promotion/Fall Prevention:   activity supervised   assistive device/personal items within reach   clutter free environment maintained   fall prevention program maintained   nonskid shoes/slippers when out of bed   safety round/check completed  Taken 5/20/2025 0000 by Abebe Hopkins, RN  Safety Promotion/Fall Prevention:   activity supervised   assistive device/personal items within reach   clutter free environment maintained   fall prevention program maintained   safety round/check completed   nonskid shoes/slippers when out of  bed  Taken 5/19/2025 2200 by Abebe Hopkins RN  Safety Promotion/Fall Prevention:   activity supervised   assistive device/personal items within reach   clutter free environment maintained   fall prevention program maintained   nonskid shoes/slippers when out of bed   safety round/check completed  Taken 5/19/2025 2000 by Abebe Hopkins RN  Safety Promotion/Fall Prevention:   activity supervised   assistive device/personal items within reach   clutter free environment maintained   fall prevention program maintained   nonskid shoes/slippers when out of bed   safety round/check completed  Intervention: Prevent Skin Injury  Recent Flowsheet Documentation  Taken 5/20/2025 0200 by Abebe Hopkins RN  Skin Protection:   transparent dressing maintained   silicone foam dressing in place   incontinence pads utilized  Taken 5/20/2025 0000 by Abebe Hopkins RN  Skin Protection:   silicone foam dressing in place   transparent dressing maintained   incontinence pads utilized  Taken 5/19/2025 2200 by Abebe Hopkins RN  Body Position: position maintained  Skin Protection: silicone foam dressing in place  Taken 5/19/2025 2000 by Abebe Hopkins RN  Body Position: position maintained  Skin Protection: (Silicoe dressings pulled back and skin under assessed)   silicone foam dressing in place   incontinence pads utilized   transparent dressing maintained  Intervention: Prevent and Manage VTE (Venous Thromboembolism) Risk  Recent Flowsheet Documentation  Taken 5/19/2025 2000 by Abebe Hopkins RN  VTE Prevention/Management: (See MAR) other (see comments)  Intervention: Prevent Infection  Recent Flowsheet Documentation  Taken 5/20/2025 0200 by Abebe Hopkins RN  Infection Prevention:   environmental surveillance performed   hand hygiene promoted   rest/sleep promoted  Taken 5/20/2025 0000 by Abebe Hopkins RN  Infection Prevention:   environmental surveillance performed   hand hygiene promoted    rest/sleep promoted  Taken 5/19/2025 2200 by Abebe Hopkins RN  Infection Prevention:   environmental surveillance performed   hand hygiene promoted   rest/sleep promoted  Taken 5/19/2025 2000 by Abebe Hopkins RN  Infection Prevention:   environmental surveillance performed   hand hygiene promoted   rest/sleep promoted  Goal: Optimal Comfort and Wellbeing  Outcome: Progressing  Goal: Readiness for Transition of Care  Outcome: Progressing     Problem: Chest Pain  Goal: Resolution of Chest Pain Symptoms  Outcome: Progressing     Problem: Skin Injury Risk Increased  Goal: Skin Health and Integrity  Outcome: Progressing  Intervention: Optimize Skin Protection  Recent Flowsheet Documentation  Taken 5/20/2025 0200 by Abebe Hopkins RN  Activity Management: activity encouraged  Pressure Reduction Techniques:   frequent weight shift encouraged   weight shift assistance provided   pressure points protected  Pressure Reduction Devices:   pressure-redistributing mattress utilized   positioning supports utilized  Skin Protection:   transparent dressing maintained   silicone foam dressing in place   incontinence pads utilized  Taken 5/20/2025 0000 by Abebe Hopkins RN  Activity Management: activity encouraged  Pressure Reduction Techniques:   weight shift assistance provided   frequent weight shift encouraged   pressure points protected  Head of Bed (HOB) Positioning: HOB elevated  Pressure Reduction Devices:   pressure-redistributing mattress utilized   positioning supports utilized  Skin Protection:   silicone foam dressing in place   transparent dressing maintained   incontinence pads utilized  Taken 5/19/2025 2200 by Abebe Hopkins RN  Activity Management: activity encouraged  Pressure Reduction Techniques:   pressure points protected   frequent weight shift encouraged   weight shift assistance provided  Head of Bed (HOB) Positioning: HOB elevated  Pressure Reduction Devices:    pressure-redistributing mattress utilized   positioning supports utilized  Skin Protection: silicone foam dressing in place  Taken 5/19/2025 2000 by Abebe Hopkins RN  Activity Management: activity encouraged  Pressure Reduction Techniques:   pressure points protected   weight shift assistance provided   frequent weight shift encouraged  Head of Bed (HOB) Positioning: HOB elevated  Pressure Reduction Devices:   pressure-redistributing mattress utilized   positioning supports utilized  Skin Protection: (Silicoe dressings pulled back and skin under assessed)   silicone foam dressing in place   incontinence pads utilized   transparent dressing maintained     Problem: Comorbidity Management  Goal: Maintenance of Heart Failure Symptom Control  Outcome: Progressing  Intervention: Maintain Heart Failure Management  Recent Flowsheet Documentation  Taken 5/20/2025 0200 by Abebe Hopkins RN  Medication Review/Management: medications reviewed  Taken 5/20/2025 0000 by Abebe Hopkins RN  Medication Review/Management: medications reviewed  Taken 5/19/2025 2200 by Abebe Hopkins RN  Medication Review/Management: medications reviewed  Taken 5/19/2025 2000 by Abebe Hopkins RN  Medication Review/Management: medications reviewed  Goal: Blood Pressure in Desired Range  Outcome: Progressing  Intervention: Maintain Blood Pressure Management  Recent Flowsheet Documentation  Taken 5/20/2025 0200 by Abebe Hopkins RN  Medication Review/Management: medications reviewed  Taken 5/20/2025 0000 by Abebe Hopkins RN  Medication Review/Management: medications reviewed  Taken 5/19/2025 2200 by Abebe Hopkins RN  Medication Review/Management: medications reviewed  Taken 5/19/2025 2000 by Abebe Hopkins RN  Medication Review/Management: medications reviewed   Goal Outcome Evaluation:  Plan of Care Reviewed With: patient        Progress: no change  Outcome Evaluation: Patient is alert and oriented x4 with  intermittent confusion. 2L NC overnight. No complaints of chest pain overnight. VSS. Patient has been up ambulating around the unit and in their room. Skin precautions in place. Safety precautions in place. Fall precautions in place. Will continue plan of care. Patient will be NPO following a light breakfast this morning.

## 2025-05-20 NOTE — PLAN OF CARE
Goal Outcome Evaluation:  Plan of Care Reviewed With: patient, child           Outcome Evaluation: Pt. presents below baseline function w/generalized weakness, balance deficits and decreased functional endurance affecting his ability to safely participate in functional mobility. He performed bed mobility, transfers and ambulated 160' w/contact guard assist. Activity limited by fatigue. Pt. would benefit from acute PT services to address stated deficts.    Anticipated Discharge Disposition (PT): home with assist, home with home health

## 2025-05-20 NOTE — THERAPY EVALUATION
Patient Name: Albin Andino  : 1940    MRN: [id number]                              Today's Date: 2025       Admit Date: 2025    Visit Dx:     ICD-10-CM ICD-9-CM   1. Coronary artery disease involving native coronary artery of native heart with angina pectoris  I25.119 414.01     413.9     Patient Active Problem List   Diagnosis    Osteoarthritis    Enlarged prostate    Essential hypertension    Hyperlipidemia LDL goal <70    Low back pain    Restless legs syndrome    Thrombocytopenia    Low vitamin D level    Coronary artery disease involving native coronary artery of native heart with angina pectoris    Carotid artery disease    Esophageal stricture    Heart failure with preserved left ventricular function (HFpEF)    NSTEMI (non-ST elevated myocardial infarction)     Past Medical History:   Diagnosis Date    Arthritis     Atrial fibrillation 2016    Dr. Mullins      CHF (congestive heart failure)     Chronic HFrEF (heart failure with reduced ejection fraction)     Coronary artery disease     Elevated LFTs, with hyperbilirubinemia, due to above  2016    Enlarged prostate     Heart disease     History of transfusion     Hyperlipidemia     Hypertension     Normocytic anemia 2016    NSTEMI (non-ST elevated myocardial infarction)     PER H&P (2025)     Past Surgical History:   Procedure Laterality Date    BACK SURGERY      CARDIAC CATHETERIZATION Right 2024    Procedure: Stent YAMILETH bypass graft;  Surgeon: Niels Cruz IV, MD;  Location:  [facility];  Service: Cardiovascular;  Laterality: Right;    CARDIAC CATHETERIZATION N/A 2025    Procedure: Left Heart Cath;  Surgeon: Niels Cruz IV, MD;  Location:  [facility];  Service: Cardiovascular;  Laterality: N/A;    CARDIAC CATHETERIZATION N/A 2025    Procedure: Angioplasty-coronary;  Surgeon: Niels Cruz IV, MD;  Location:  [facility];  Service: Cardiovascular;  Laterality: N/A;    CARDIAC CATHETERIZATION Right 2/17/2025    Procedure: Angioplasty-coronary possible drug coated balloon;  Surgeon: Niels Cruz IV, MD;  Location:  [facility];  Service: Cardiovascular;  Laterality: Right;  Drug coated balloon    CARDIAC CATHETERIZATION N/A 2/17/2025    Procedure: Atherectomy-coronary;  Surgeon: Niels Cruz IV, MD;  Location:  [facility];  Service: Cardiovascular;  Laterality: N/A;    CARDIAC SURGERY      CHOLECYSTECTOMY N/A 08/28/2016    Procedure: CHOLECYSTECTOMY LAPAROSCOPIC WITH INTRA-OPERATIVE CHOLANGIOGRAM;  Surgeon: Rogelio Sanchez MD;  Location:  [facility];  Service:     CORONARY STENT PLACEMENT      HERNIA REPAIR      KNEE SURGERY      PROSTATE SURGERY      THROAT SURGERY        General Information       Row Name 05/20/25 1119          Physical Therapy Time and Intention    Document Type evaluation  -SS     Mode of Treatment physical therapy  -SS       Row Name 05/20/25 1119          General Information    Patient Profile Reviewed yes  -SS     Prior Level of Function independent:;all household mobility;community mobility;gait;transfer;bed mobility;driving  pt declines use of AD or any acute falls, community ambulator  -SS     Existing Precautions/Restrictions fall;cardiac  -SS     Barriers to Rehab medically complex  -SS       Row Name 05/20/25 1119          Living Environment    Current Living Arrangements home  -SS     People in Home spouse;other (see comments)  son nearby as well  -SS       Row Name 05/20/25 1119          Home Main Entrance    Number of Stairs, Main Entrance two  -SS     Stair Railings, Main Entrance none  -SS       Row Name 05/20/25 1119          Stairs Within Home, Primary    Stairs, Within Home, Primary does not need to navigate  -SS       Row Name 05/20/25 1119          Cognition    Orientation Status (Cognition) oriented x 3  -SS        Row Name 05/20/25 1119          Safety Issues/Impairments Affecting Functional Mobility    Safety Issues Affecting Function (Mobility) insight into deficits/self-awareness;safety precaution awareness;safety precautions follow-through/compliance;sequencing abilities  -     Impairments Affecting Function (Mobility) endurance/activity tolerance;postural/trunk control;strength  -               User Key  (r) = Recorded By, (t) = Taken By, (c) = Cosigned By      Initials Name Provider Type     Tova Vargas, NILA Physical Therapist                   Mobility       Row Name 05/20/25 1123          Bed Mobility    Bed Mobility scooting/bridging;supine-sit;sit-supine  -SS     Scooting/Bridging [place] (Bed Mobility) standby assist;verbal cues  -     Supine-Sit [place] (Bed Mobility) standby assist;verbal cues  -     Sit-Supine [place] (Bed Mobility) standby assist;verbal cues  -     Assistive Device (Bed Mobility) head of bed elevated  -     Comment, (Bed Mobility) VC for sequencing; pt. asymptomatic  -       Row Name 05/20/25 1123          Bed-Chair Transfer    Bed-Chair [place] (Transfers) not tested  -[facility] Name 05/20/25 1123          Sit-Stand Transfer    Sit-Stand [place] (Transfers) contact guard;verbal cues  -     Assistive Device (Sit-Stand Transfers) other (see comments)  none  -     Comment, (Sit-Stand Transfer) VC for hand placement, appropriate alignment, lowering with eccentric control  -       Row Name 05/20/25 1123          Gait/Stairs (Locomotion)    [place] Level (Gait) contact guard;verbal cues  -     Patient was able to Ambulate yes  -     Distance in Feet (Gait) 160  -     Deviations/Abnormal Patterns (Gait) bilateral deviations;liz decreased;gait speed decreased;stride length decreased  -     Bilateral Gait Deviations forward flexed posture;heel strike decreased  -     Comment, (Gait/Stairs) Pt. ambulated with a step through gait  pattern. VC for upright posture, environmental scanning. Activity limited by fatigue. Pt. asymptomatic. One minor bout of instability w/narrow base of support, pt. able to self correct w/stepping strategy. May consider use of single touch cane in subsequent visits.  -               User Key  (r) = Recorded By, (t) = Taken By, (c) = Cosigned By      Initials Name Provider Type     Tova Vargas, NILA Physical Therapist                   Obj/Interventions       Row Name 05/20/25 1144          Range of Motion Comprehensive    General Range of Motion bilateral lower extremity ROM WFL  -SS       Row Name 05/20/25 1144          Strength Comprehensive (MMT)    Comment, General Manual Muscle Testing (MMT) Assessment BLE gross 4-/5  -SS       Row Name 05/20/25 1144          Balance    Balance Assessment sitting static balance;sitting dynamic balance;standing static balance;standing dynamic balance  -SS     Static Sitting Balance standby assist  -SS     Dynamic Sitting Balance contact guard  -SS     Position, Sitting Balance unsupported;sitting edge of bed  -SS     Static Standing Balance standby assist  -SS     Dynamic Standing Balance contact guard  -SS     Position/Device Used, Standing Balance unsupported  -SS     Balance Interventions sitting;standing;sit to stand;supported;static;dynamic  -SS       Row Name 05/20/25 1144          Sensory Assessment (Somatosensory)    Sensory Assessment (Somatosensory) LE sensation intact  -               User Key  (r) = Recorded By, (t) = Taken By, (c) = Cosigned By      Initials Name Provider Type     Tova Vargas PT Physical Therapist                   Goals/Plan       Row Name 05/20/25 1150          Bed Mobility Goal 1 (PT)    Activity/Assistive Device (Bed Mobility Goal 1, PT) bed mobility activities, all  -SS     [place] Level/Cues Needed (Bed Mobility Goal 1, PT) independent  -SS     Time Frame (Bed Mobility Goal 1, PT) short term goal (STG);5 days  -       Row  Name 05/20/25 1150          Transfer Goal 1 (PT)    Activity/Assistive Device (Transfer Goal 1, PT) sit-to-stand/stand-to-sit;bed-to-chair/chair-to-bed  -SS     [place] Level/Cues Needed (Transfer Goal 1, PT) independent  -SS     Time Frame (Transfer Goal 1, PT) long term goal (LTG);10 days  -SS       Row Name 05/20/25 1150          Gait Training Goal 1 (PT)    Activity/Assistive Device (Gait Training Goal 1, PT) gait (walking locomotion);assistive device use;cane, straight  -SS     [place] Level (Gait Training Goal 1, PT) modified independence  -SS     Distance (Gait Training Goal 1, PT) 500  -SS     Time Frame (Gait Training Goal 1, PT) long term goal (LTG);10 days  -SS       Row Name 05/20/25 1150          Balance Goal 1 (PT)    Activity/Assistive Device (Balance Goal) sitting static balance;sitting dynamic balance;sit to stand dynamic balance;standing static balance;standing dynamic balance  -SS     [place] Level/Cues Needed (Balance Goal 1, PT) modified independence  -SS     Time Frame (Balance Goal 1, PT) long-term goal (LTG);1 week  -       Row Name 05/20/25 1150          Stairs Goal 1 (PT)    Activity/Assistive Device (Stairs Goal 1, PT) ascending stairs;descending stairs;assistive device use;cane, straight  -SS     [place] Level/Cues Needed (Stairs Goal 1, PT) modified independence  -SS     Number of Stairs (Stairs Goal 1, PT) 2  -SS     Time Frame (Stairs Goal 1, PT) long term goal (LTG);10 days  -       Row Name 05/20/25 1150          Therapy Assessment/Plan (PT)    Planned Therapy Interventions (PT) balance training;bed mobility training;gait training;home exercise program;neuromuscular re-education;patient/family education;postural re-education;motor coordination training;ROM (range of motion);stair training;strengthening;stretching;transfer training  -               User Key  (r) = Recorded By, (t) = Taken By, (c) = Cosigned By      Initials Name Provider Type     Alicia  Tova, PT Physical Therapist                   Clinical Impression       Row Name 05/20/25 1146          Pain    Pretreatment Pain Rating 0/10 - no pain  -     Posttreatment Pain Rating 0/10 - no pain  -     Pre/Posttreatment Pain Comment pt. denies chest pains or any other symptoms  -       Row Name 05/20/25 1146          Plan of Care Review    Plan of Care Reviewed With patient;child  -     Outcome Evaluation Pt. presents below baseline function w/generalized weakness, balance deficits and decreased functional endurance affecting his ability to safely participate in functional mobility. He performed bed mobility, transfers and ambulated 160' w/contact guard assist. Activity limited by fatigue. Pt. would benefit from acute PT services to address stated deficts.  -       Row Name 05/20/25 1146          Therapy Assessment/Plan (PT)    Patient/Family Therapy Goals Statement (PT) to go home  -     Rehab Potential (PT) good  -     Criteria for Skilled Interventions Met (PT) yes;skilled treatment is necessary;meets criteria  -     Therapy Frequency (PT) daily  -     Predicted Duration of Therapy Intervention (PT) 10 days  -       Row Name 05/20/25 1146          Vital Signs    Pre Systolic BP Rehab 126  -SS     Pre Treatment Diastolic BP 62  -SS     Post Systolic BP Rehab 133  -SS     Post Treatment Diastolic BP 81  -SS     Pretreatment Heart Rate (beats/min) 56  -SS     Posttreatment Heart Rate (beats/min) 58  -SS     Pre SpO2 (%) 95  -SS     O2 Delivery Pre Treatment room air  -SS     Post SpO2 (%) 95  -SS     O2 Delivery Post Treatment room air  -SS     Pre Patient Position Supine  -SS     Post Patient Position Supine  -SS       Row Name 05/20/25 1146          Positioning and Restraints    Pre-Treatment Position in bed  -SS     Post Treatment Position bed  -SS     In Bed notified nsg;fowlers;call light within reach;encouraged to call for assist;exit alarm on;with family/caregiver;legs elevated;heels  elevated  -               User Key  (r) = Recorded By, (t) = Taken By, (c) = Cosigned By      Initials Name Provider Type    Tova Agee PT Physical Therapist                   Outcome Measures       Row Name 05/20/25 1152 05/20/25 0805       How much help from another person do you currently need...    Turning from your back to your side while in flat bed without using bedrails? 4  -SS 4  -KT    Moving from lying on back to sitting on the side of a flat bed without bedrails? 3  -SS 3  -KT    Moving to and from a bed to a chair (including a wheelchair)? 3  -SS 3  -KT    Standing up from a chair using your arms (e.g., wheelchair, bedside chair)? 3  -SS 4  -KT    Climbing 3-5 steps with a railing? 3  -SS 4  -KT    To walk in hospital room? 3  - 4  -KT    AM-PAC 6 Clicks Score (PT) 19  - 22  -KT    Highest Level of Mobility Goal Walk 10 Steps or More-6  -SS Walk 25 Feet or More-7  -KT      Row Name 05/20/25 1152          Functional Assessment    Outcome Measure Options AM-PAC 6 Clicks Basic Mobility (PT)  -               User Key  (r) = Recorded By, (t) = Taken By, (c) = Cosigned By      Initials Name Provider Type    Eulalio Jacobo RN Registered Nurse    Tova Agee PT Physical Therapist                                 Physical Therapy Education       Title: PT OT SLP Therapies (In Progress)       Topic: Physical Therapy (In Progress)       Point: Mobility training (Done)       Learning Progress Summary            Patient EDDI Matthews VU, DU,NR by  at 5/20/2025 1153    Comment: Educated pt. safety/technique w/bed mobility, transfers, ambulation, PT POC                      Point: Home exercise program (Not Started)       Learner Progress:  Not documented in this visit.              Point: Body mechanics (Done)       Learning Progress Summary            Patient EDDI Matthews VU, DU,NR by  at 5/20/2025 1153    Comment: Educated pt. safety/technique w/bed mobility, transfers, ambulation, PT POC                       Point: Precautions (Done)       Learning Progress Summary            Patient EDDI Matthews, LOUIE,DU,NR by  at 5/20/2025 1153    Comment: Educated pt. safety/technique w/bed mobility, transfers, ambulation, PT POC                                      User Key       Initials Effective Dates Name Provider Type Discipline     06/01/21 -  Tova Vargas, PT Physical Therapist PT                  PT Recommendation and Plan  Planned Therapy Interventions (PT): balance training, bed mobility training, gait training, home exercise program, neuromuscular re-education, patient/family education, postural re-education, motor coordination training, ROM (range of motion), stair training, strengthening, stretching, transfer training  Outcome Evaluation: Pt. presents below baseline function w/generalized weakness, balance deficits and decreased functional endurance affecting his ability to safely participate in functional mobility. He performed bed mobility, transfers and ambulated 160' w/contact guard assist. Activity limited by fatigue. Pt. would benefit from acute PT services to address stated deficts.     Time Calculation:   PT Evaluation Complexity  History, PT Evaluation Complexity: 3 or more personal factors and/or comorbidities  Examination of Body Systems (PT Eval Complexity): total of 4 or more elements  Clinical Presentation (PT Evaluation Complexity): evolving  Clinical Decision Making (PT Evaluation Complexity): low complexity  Overall Complexity (PT Evaluation Complexity): low complexity     PT Charges       Row Name 05/20/25 1154             Time Calculation    Start Time 1004  -SS      PT Received On 05/20/25  -      PT Goal Re-Cert Due Date 05/30/25  -SS         Untimed Charges    PT Eval/Re-eval Minutes 48  -SS         Total Minutes    Untimed Charges Total Minutes 48  -SS       Total Minutes 48  -SS                User Key  (r) = Recorded By, (t) = Taken By, (c) = Cosigned By      Initials Name  Provider Type    SS Tova Vargas, PT Physical Therapist                  Therapy Charges for Today       Code Description Service Date Service Provider Modifiers Qty    64152312295 HC PT EVAL LOW COMPLEXITY 4 5/20/2025 Tova Vargas, PT GP 1            PT G-Codes  Outcome Measure Options: AM-PAC 6 Clicks Basic Mobility (PT)  AM-PAC 6 Clicks Score (PT): 19  PT Discharge Summary  Anticipated Discharge Disposition (PT): home with assist, home with home health    Tova Vargas PT  5/20/2025

## 2025-05-20 NOTE — PLAN OF CARE
Problem: Adult Inpatient Plan of Care  Goal: Plan of Care Review  Outcome: Progressing  Goal: Patient-Specific Goal (Individualized)  Outcome: Progressing  Goal: Absence of Hospital-Acquired Illness or Injury  Outcome: Progressing  Intervention: Identify and Manage Fall Risk  Recent Flowsheet Documentation  Taken 5/20/2025 1805 by Shirley Chan RN  Safety Promotion/Fall Prevention:   activity supervised   safety round/check completed  Taken 5/20/2025 1634 by Shirley Chan RN  Safety Promotion/Fall Prevention: safety round/check completed  Intervention: Prevent Skin Injury  Recent Flowsheet Documentation  Taken 5/20/2025 1805 by Shirley Chan RN  Body Position: position changed independently  Skin Protection: transparent dressing maintained  Taken 5/20/2025 1634 by Shirley Chan RN  Body Position: position changed independently  Skin Protection:   transparent dressing maintained   silicone foam dressing in place  Goal: Optimal Comfort and Wellbeing  Outcome: Progressing  Goal: Readiness for Transition of Care  Outcome: Progressing     Problem: Chest Pain  Goal: Resolution of Chest Pain Symptoms  Outcome: Progressing     Problem: Skin Injury Risk Increased  Goal: Skin Health and Integrity  Outcome: Progressing  Intervention: Optimize Skin Protection  Recent Flowsheet Documentation  Taken 5/20/2025 1805 by Shirley Chan RN  Activity Management: activity encouraged  Pressure Reduction Techniques: frequent weight shift encouraged  Head of Bed (HOB) Positioning: HOB elevated  Pressure Reduction Devices: pressure-redistributing mattress utilized  Skin Protection: transparent dressing maintained  Taken 5/20/2025 1634 by Shirley Chan RN  Activity Management: activity encouraged  Pressure Reduction Techniques: frequent weight shift encouraged  Head of Bed (HOB) Positioning: HOB elevated  Pressure Reduction Devices: pressure-redistributing mattress utilized  Skin Protection:   transparent dressing maintained    silicone foam dressing in place     Problem: Comorbidity Management  Goal: Maintenance of Heart Failure Symptom Control  Outcome: Progressing  Goal: Blood Pressure in Desired Range  Outcome: Progressing     Problem: Fall Injury Risk  Goal: Absence of Fall and Fall-Related Injury  Outcome: Progressing  Intervention: Promote Injury-Free Environment  Recent Flowsheet Documentation  Taken 5/20/2025 1805 by Shirley Chan, RN  Safety Promotion/Fall Prevention:   activity supervised   safety round/check completed  Taken 5/20/2025 1634 by Shirley Chan, RN  Safety Promotion/Fall Prevention: safety round/check completed   Goal Outcome Evaluation:      Patient in bed. Attempted to remove air. Began to bleed, added 2 mL back into the Trband. VSS. Mag replaced. Aox4, RA. Daughter states patient sundowns at night. Report given

## 2025-05-20 NOTE — CASE MANAGEMENT/SOCIAL WORK
Discharge Planning Assessment  [facility]     Patient Name: Albin Andino  MRN: [id number]  Today's Date: 5/20/2025    Admit Date: 5/19/2025    Plan: home   Discharge Needs Assessment       Row Name 05/20/25 0836       Living Environment    People in Home spouse    Current Living Arrangements home    Primary Care Provided by self;spouse/significant other    Quality of Family Relationships helpful;involved;supportive       Transition Planning    Patient/Family Anticipates Transition to home with family       Discharge Needs Assessment    Equipment Currently Used at Home none    Concerns to be Addressed basic needs;discharge planning                   Discharge Plan       Row Name 05/20/25 0836       Plan    Plan home    Patient/Family in Agreement with Plan yes    Plan Comments I met with this patient and his daughter bedside. He lives with his wife in [facility]. He is independent with self care, but needs assistance with all other activities of daily living. He is independent with mobility. PT has been consulted. He anticipates returning home after this hospitalization, and his son can transport. Case management will follow.    Final Discharge Disposition Code 01 - home or self-care                       Demographic Summary       Row Name 05/20/25 0835       General Information    General Information Comments I confirmed that Ariela Brannon is Mr Andino's PCP and he has [employer] Medicare                   Functional Status       Row Name 05/20/25 0835       Functional Status, IADL    Medications independent    Meal Preparation completely dependent    Housekeeping completely dependent    Laundry completely dependent    Shopping completely dependent                   Psychosocial    No documentation.                  Abuse/Neglect    No documentation.                  Legal    No documentation.                  Substance Abuse    No documentation.                  Patient Forms    No  documentation.                     Savanna Arce RN

## 2025-05-21 ENCOUNTER — TRANSCRIBE ORDERS (OUTPATIENT)
Dept: CARDIAC REHAB | Facility: HOSPITAL | Age: 85
End: 2025-05-21
Payer: MEDICARE

## 2025-05-21 ENCOUNTER — APPOINTMENT (OUTPATIENT)
Dept: CARDIOLOGY | Facility: HOSPITAL | Age: 85
DRG: 324 | End: 2025-05-21
Payer: MEDICARE

## 2025-05-21 ENCOUNTER — READMISSION MANAGEMENT (OUTPATIENT)
Dept: CALL CENTER | Facility: HOSPITAL | Age: 85
End: 2025-05-21
Payer: MEDICARE

## 2025-05-21 VITALS
OXYGEN SATURATION: 94 % | DIASTOLIC BLOOD PRESSURE: 76 MMHG | TEMPERATURE: 97.8 F | BODY MASS INDEX: 34.16 KG/M2 | HEIGHT: 60 IN | HEART RATE: 75 BPM | SYSTOLIC BLOOD PRESSURE: 134 MMHG | WEIGHT: 174 LBS | RESPIRATION RATE: 12 BRPM

## 2025-05-21 DIAGNOSIS — Z95.5 STENTED CORONARY ARTERY: Primary | ICD-10-CM

## 2025-05-21 PROBLEM — I21.4 NSTEMI (NON-ST ELEVATED MYOCARDIAL INFARCTION): Status: RESOLVED | Noted: 2025-05-19 | Resolved: 2025-05-21

## 2025-05-21 LAB
ANION GAP SERPL CALCULATED.3IONS-SCNC: 12 MMOL/L (ref 5–15)
AORTIC DIMENSIONLESS INDEX: 0.55 (DI)
AV MEAN PRESS GRAD SYS DOP V1V2: 6 MMHG
AV VMAX SYS DOP: 172 CM/SEC
BH CV ECHO MEAS - AO MAX PG: 11.8 MMHG
BH CV ECHO MEAS - AO ROOT DIAM: 5.2 CM
BH CV ECHO MEAS - AO V2 VTI: 35.5 CM
BH CV ECHO MEAS - AVA(I,D): 1.71 CM2
BH CV ECHO MEAS - EDV(CUBED): 85.2 ML
BH CV ECHO MEAS - EDV(MOD-SP2): 147 ML
BH CV ECHO MEAS - EDV(MOD-SP4): 153 ML
BH CV ECHO MEAS - EF(MOD-SP2): 55.7 %
BH CV ECHO MEAS - EF(MOD-SP4): 55 %
BH CV ECHO MEAS - ESV(MOD-SP2): 65.1 ML
BH CV ECHO MEAS - ESV(MOD-SP4): 68.9 ML
BH CV ECHO MEAS - IVS/LVPW: 1 CM
BH CV ECHO MEAS - IVSD: 1.1 CM
BH CV ECHO MEAS - LA DIMENSION: 5.4 CM
BH CV ECHO MEAS - LAT PEAK E' VEL: 9 CM/SEC
BH CV ECHO MEAS - LV DIASTOLIC VOL/BSA (35-75): 70 CM2
BH CV ECHO MEAS - LV MASS(C)D: 168.9 GRAMS
BH CV ECHO MEAS - LV MAX PG: 3.4 MMHG
BH CV ECHO MEAS - LV MEAN PG: 1.67 MMHG
BH CV ECHO MEAS - LV SYSTOLIC VOL/BSA (12-30): 31.5 CM2
BH CV ECHO MEAS - LV V1 MAX: 90.8 CM/SEC
BH CV ECHO MEAS - LV V1 VTI: 19.4 CM
BH CV ECHO MEAS - LVIDD: 4.4 CM
BH CV ECHO MEAS - LVOT AREA: 3.1 CM2
BH CV ECHO MEAS - LVOT DIAM: 2 CM
BH CV ECHO MEAS - LVPWD: 1.1 CM
BH CV ECHO MEAS - MED PEAK E' VEL: 4.7 CM/SEC
BH CV ECHO MEAS - MV A MAX VEL: 45.9 CM/SEC
BH CV ECHO MEAS - MV DEC SLOPE: 352 CM/SEC2
BH CV ECHO MEAS - MV DEC TIME: 0.19 SEC
BH CV ECHO MEAS - MV E MAX VEL: 107 CM/SEC
BH CV ECHO MEAS - MV E/A: 2.33
BH CV ECHO MEAS - MV MAX PG: 6.2 MMHG
BH CV ECHO MEAS - MV MEAN PG: 2 MMHG
BH CV ECHO MEAS - MV P1/2T: 105.7 MSEC
BH CV ECHO MEAS - MV V2 VTI: 38.4 CM
BH CV ECHO MEAS - MVA(P1/2T): 2.08 CM2
BH CV ECHO MEAS - MVA(VTI): 1.58 CM2
BH CV ECHO MEAS - PA ACC TIME: 0.08 SEC
BH CV ECHO MEAS - RAP SYSTOLE: 3 MMHG
BH CV ECHO MEAS - RVSP: 29.6 MMHG
BH CV ECHO MEAS - SV(LVOT): 60.8 ML
BH CV ECHO MEAS - SV(MOD-SP2): 81.9 ML
BH CV ECHO MEAS - SV(MOD-SP4): 84.1 ML
BH CV ECHO MEAS - SVI(LVOT): 27.8 ML/M2
BH CV ECHO MEAS - SVI(MOD-SP2): 37.4 ML/M2
BH CV ECHO MEAS - SVI(MOD-SP4): 38.5 ML/M2
BH CV ECHO MEAS - TAPSE (>1.6): 2.09 CM
BH CV ECHO MEAS - TR MAX PG: 26.6 MMHG
BH CV ECHO MEAS - TR MAX VEL: 258 CM/SEC
BH CV ECHO MEASUREMENTS AVERAGE E/E' RATIO: 15.62
BH CV XLRA - RV BASE: 4 CM
BH CV XLRA - RV LENGTH: 7.9 CM
BH CV XLRA - RV MID: 3.3 CM
BH CV XLRA - TDI S': 8.8 CM/SEC
BUN SERPL-MCNC: 12 MG/DL (ref 8–23)
BUN/CREAT SERPL: 15.4 (ref 7–25)
CALCIUM SPEC-SCNC: 9 MG/DL (ref 8.6–10.5)
CHLORIDE SERPL-SCNC: 103 MMOL/L (ref 98–107)
CO2 SERPL-SCNC: 24 MMOL/L (ref 22–29)
CREAT SERPL-MCNC: 0.78 MG/DL (ref 0.76–1.27)
EGFRCR SERPLBLD CKD-EPI 2021: 86.9 ML/MIN/1.73
GLUCOSE SERPL-MCNC: 85 MG/DL (ref 65–99)
LEFT ATRIUM VOLUME INDEX: 42.1 ML/M2
LV EF 2D ECHO EST: 55 %
LV EF BIPLANE MOD: 55.7 %
MAGNESIUM SERPL-MCNC: 2.4 MG/DL (ref 1.6–2.4)
POTASSIUM SERPL-SCNC: 3.8 MMOL/L (ref 3.5–5.2)
POTASSIUM SERPL-SCNC: 4.4 MMOL/L (ref 3.5–5.2)
QT INTERVAL: 524 MS
QTC INTERVAL: 496 MS
SODIUM SERPL-SCNC: 139 MMOL/L (ref 136–145)

## 2025-05-21 PROCEDURE — 80048 BASIC METABOLIC PNL TOTAL CA: CPT | Performed by: INTERNAL MEDICINE

## 2025-05-21 PROCEDURE — 25510000001 IOPAMIDOL PER 1 ML: Performed by: INTERNAL MEDICINE

## 2025-05-21 PROCEDURE — B221Z2Z COMPUTERIZED TOMOGRAPHY (CT SCAN) OF MULTIPLE CORONARY ARTERIES USING INTRAVASCULAR OPTICAL COHERENCE: ICD-10-PCS | Performed by: INTERNAL MEDICINE

## 2025-05-21 PROCEDURE — 92972 PERQ TRLUML CORONRY LITHOTRP: CPT | Performed by: INTERNAL MEDICINE

## 2025-05-21 PROCEDURE — 99238 HOSP IP/OBS DSCHRG MGMT 30/<: CPT | Performed by: INTERNAL MEDICINE

## 2025-05-21 PROCEDURE — 0913T PRQ TCAT THER RX NTRAC BALO1: CPT | Performed by: INTERNAL MEDICINE

## 2025-05-21 PROCEDURE — 84132 ASSAY OF SERUM POTASSIUM: CPT | Performed by: INTERNAL MEDICINE

## 2025-05-21 PROCEDURE — C9610: HCPCS | Performed by: INTERNAL MEDICINE

## 2025-05-21 PROCEDURE — C1725 CATH, TRANSLUMIN NON-LASER: HCPCS | Performed by: INTERNAL MEDICINE

## 2025-05-21 PROCEDURE — 83735 ASSAY OF MAGNESIUM: CPT | Performed by: INTERNAL MEDICINE

## 2025-05-21 PROCEDURE — C1761: HCPCS | Performed by: INTERNAL MEDICINE

## 2025-05-21 PROCEDURE — 25010000002 HEPARIN (PORCINE) PER 1000 UNITS: Performed by: INTERNAL MEDICINE

## 2025-05-21 PROCEDURE — 92920 PRQ TRLUML C ANGIOP 1ART&/BR: CPT | Performed by: INTERNAL MEDICINE

## 2025-05-21 PROCEDURE — C1894 INTRO/SHEATH, NON-LASER: HCPCS | Performed by: INTERNAL MEDICINE

## 2025-05-21 PROCEDURE — 25810000003 SODIUM CHLORIDE 0.9 % SOLUTION: Performed by: INTERNAL MEDICINE

## 2025-05-21 PROCEDURE — 25010000002 MIDAZOLAM PER 1 MG: Performed by: INTERNAL MEDICINE

## 2025-05-21 PROCEDURE — C1887 CATHETER, GUIDING: HCPCS | Performed by: INTERNAL MEDICINE

## 2025-05-21 PROCEDURE — XW0J3JA INTRODUCTION OF PACLITAXEL-COATED BALLOON TECHNOLOGY, TWO BALLOONS INTO CORONARY ARTERY, ONE ARTERY, PERCUTANEOUS APPROACH, NEW TECHNOLOGY GROUP 10: ICD-10-PCS | Performed by: INTERNAL MEDICINE

## 2025-05-21 PROCEDURE — C1753 CATH, INTRAVAS ULTRASOUND: HCPCS | Performed by: INTERNAL MEDICINE

## 2025-05-21 PROCEDURE — 25010000002 NICARDIPINE 2.5 MG/ML SOLUTION: Performed by: INTERNAL MEDICINE

## 2025-05-21 PROCEDURE — 25010000002 FENTANYL CITRATE (PF) 50 MCG/ML SOLUTION: Performed by: INTERNAL MEDICINE

## 2025-05-21 PROCEDURE — 02F03ZZ FRAGMENTATION IN CORONARY ARTERY, ONE ARTERY, PERCUTANEOUS APPROACH: ICD-10-PCS | Performed by: INTERNAL MEDICINE

## 2025-05-21 PROCEDURE — 25010000002 LIDOCAINE PF 1% 1 % SOLUTION: Performed by: INTERNAL MEDICINE

## 2025-05-21 PROCEDURE — 93306 TTE W/DOPPLER COMPLETE: CPT | Performed by: INTERNAL MEDICINE

## 2025-05-21 PROCEDURE — C1769 GUIDE WIRE: HCPCS | Performed by: INTERNAL MEDICINE

## 2025-05-21 PROCEDURE — 25010000002 BIVALIRUDIN TRIFLUOROACETATE 250 MG RECONSTITUTED SOLUTION 1 EACH VIAL: Performed by: INTERNAL MEDICINE

## 2025-05-21 PROCEDURE — 93306 TTE W/DOPPLER COMPLETE: CPT

## 2025-05-21 RX ORDER — POTASSIUM CHLORIDE 1.5 G/1.58G
20 POWDER, FOR SOLUTION ORAL ONCE
Status: COMPLETED | OUTPATIENT
Start: 2025-05-21 | End: 2025-05-21

## 2025-05-21 RX ORDER — HEPARIN SODIUM 1000 [USP'U]/ML
INJECTION, SOLUTION INTRAVENOUS; SUBCUTANEOUS
Status: DISCONTINUED | OUTPATIENT
Start: 2025-05-21 | End: 2025-05-21 | Stop reason: HOSPADM

## 2025-05-21 RX ORDER — FENTANYL CITRATE 50 UG/ML
INJECTION, SOLUTION INTRAMUSCULAR; INTRAVENOUS
Status: DISCONTINUED | OUTPATIENT
Start: 2025-05-21 | End: 2025-05-21 | Stop reason: HOSPADM

## 2025-05-21 RX ORDER — RANOLAZINE 500 MG/1
500 TABLET, EXTENDED RELEASE ORAL 2 TIMES DAILY
Qty: 60 TABLET | Refills: 3 | Status: SHIPPED | OUTPATIENT
Start: 2025-05-21

## 2025-05-21 RX ORDER — MIDAZOLAM HYDROCHLORIDE 1 MG/ML
INJECTION, SOLUTION INTRAMUSCULAR; INTRAVENOUS
Status: DISCONTINUED | OUTPATIENT
Start: 2025-05-21 | End: 2025-05-21 | Stop reason: HOSPADM

## 2025-05-21 RX ORDER — IOPAMIDOL 755 MG/ML
INJECTION, SOLUTION INTRAVASCULAR
Status: DISCONTINUED | OUTPATIENT
Start: 2025-05-21 | End: 2025-05-21 | Stop reason: HOSPADM

## 2025-05-21 RX ORDER — LIDOCAINE HYDROCHLORIDE 10 MG/ML
INJECTION, SOLUTION EPIDURAL; INFILTRATION; INTRACAUDAL; PERINEURAL
Status: DISCONTINUED | OUTPATIENT
Start: 2025-05-21 | End: 2025-05-21 | Stop reason: HOSPADM

## 2025-05-21 RX ORDER — SODIUM CHLORIDE 9 MG/ML
3 INJECTION, SOLUTION INTRAVENOUS CONTINUOUS
Status: CANCELLED | OUTPATIENT
Start: 2025-05-21 | End: 2025-05-21

## 2025-05-21 RX ADMIN — FOLIC ACID TAB 400 MCG 400 MCG: 400 TAB at 08:45

## 2025-05-21 RX ADMIN — Medication 1000 UNITS: at 08:44

## 2025-05-21 RX ADMIN — PYRIDOXINE HCL TAB 50 MG 100 MG: 50 TAB at 08:45

## 2025-05-21 RX ADMIN — Medication 10 ML: at 08:47

## 2025-05-21 RX ADMIN — GABAPENTIN 800 MG: 400 CAPSULE ORAL at 20:08

## 2025-05-21 RX ADMIN — SENNOSIDES AND DOCUSATE SODIUM 2 TABLET: 50; 8.6 TABLET ORAL at 08:45

## 2025-05-21 RX ADMIN — ATORVASTATIN CALCIUM 20 MG: 20 TABLET, FILM COATED ORAL at 20:07

## 2025-05-21 RX ADMIN — CYANOCOBALAMIN TAB 1000 MCG 500 MCG: 1000 TAB at 08:44

## 2025-05-21 RX ADMIN — ACETAMINOPHEN 650 MG: 325 TABLET ORAL at 08:45

## 2025-05-21 RX ADMIN — SENNOSIDES AND DOCUSATE SODIUM 2 TABLET: 50; 8.6 TABLET ORAL at 20:09

## 2025-05-21 RX ADMIN — LOSARTAN POTASSIUM 25 MG: 25 TABLET, FILM COATED ORAL at 08:45

## 2025-05-21 RX ADMIN — RANOLAZINE 500 MG: 500 TABLET, FILM COATED, EXTENDED RELEASE ORAL at 20:08

## 2025-05-21 RX ADMIN — CLOPIDOGREL BISULFATE 75 MG: 75 TABLET, FILM COATED ORAL at 08:45

## 2025-05-21 RX ADMIN — RANOLAZINE 500 MG: 500 TABLET, FILM COATED, EXTENDED RELEASE ORAL at 08:46

## 2025-05-21 RX ADMIN — PANTOPRAZOLE SODIUM 40 MG: 40 TABLET, DELAYED RELEASE ORAL at 20:08

## 2025-05-21 RX ADMIN — ASPIRIN 81 MG: 81 TABLET, COATED ORAL at 08:44

## 2025-05-21 RX ADMIN — PANTOPRAZOLE SODIUM 40 MG: 40 TABLET, DELAYED RELEASE ORAL at 08:45

## 2025-05-21 RX ADMIN — POTASSIUM CHLORIDE 20 MEQ: 1.5 FOR SOLUTION ORAL at 08:44

## 2025-05-21 RX ADMIN — ISOSORBIDE MONONITRATE 60 MG: 60 TABLET, EXTENDED RELEASE ORAL at 08:45

## 2025-05-21 NOTE — PLAN OF CARE
Goal Outcome Evaluation:           Problem: Adult Inpatient Plan of Care  Goal: Plan of Care Review  Outcome: Progressing  Flowsheets  Taken 5/20/2025 1600 by Mago Wlilis RN  Plan of Care Reviewed With:   patient   family  Taken 5/20/2025 0312 by Abebe Hopkisn RN  Progress: no change  Goal: Patient-Specific Goal (Individualized)  Outcome: Progressing  Goal: Absence of Hospital-Acquired Illness or Injury  Outcome: Progressing  Intervention: Identify and Manage Fall Risk  Flowsheets  Taken 5/21/2025 1621  Safety Promotion/Fall Prevention:   activity supervised   assistive device/personal items within reach   clutter free environment maintained   lighting adjusted  Taken 5/21/2025 0732  Safety Promotion/Fall Prevention:   safety round/check completed   nonskid shoes/slippers when out of bed  Intervention: Prevent Skin Injury  Flowsheets  Taken 5/21/2025 1300 by Kimmy Richardson PCT  Body Position: position changed independently  Taken 5/21/2025 0732 by Roxy Flowers RN  Body Position: position changed independently  Skin Protection:   transparent dressing maintained   skin sealant/moisture barrier applied  Intervention: Prevent and Manage VTE (Venous Thromboembolism) Risk  Flowsheets (Taken 5/21/2025 1621)  VTE Prevention/Management: patient refused intervention  Intervention: Prevent Infection  Flowsheets  Taken 5/21/2025 1300 by Kimmy Richardson PCT  Infection Prevention:   environmental surveillance performed   single patient room provided   rest/sleep promoted  Taken 5/21/2025 0732 by Roxy Flowers RN  Infection Prevention:   cohorting utilized   environmental surveillance performed   equipment surfaces disinfected   hand hygiene promoted   personal protective equipment utilized   rest/sleep promoted   single patient room provided   visitors restricted/screened  Goal: Optimal Comfort and Wellbeing  Outcome: Progressing  Intervention: Provide Person-Centered Care  Flowsheets (Taken 5/21/2025 0732)  Trust  Relationship/Rapport:   thoughts/feelings acknowledged   reassurance provided   questions encouraged   questions answered   empathic listening provided   emotional support provided   choices provided   care explained  Goal: Readiness for Transition of Care  Outcome: Progressing  Intervention: Mutually Develop Transition Plan  Flowsheets  Taken 5/20/2025 0836 by Savanna Arce, RN  Equipment Currently Used at Home: none  Patient/Family Anticipates Transition to: home with family  Taken 5/19/2025 1438 by Bruna Urban RN  Transportation Anticipated: family or friend will provide  Patient/Family Anticipated Services at Transition:      Problem: Chest Pain  Goal: Resolution of Chest Pain Symptoms  Outcome: Progressing  Intervention: Manage Acute Chest Pain  Flowsheets (Taken 5/21/2025 1621)  Chest Pain Intervention: cardiac monitoring continued     Problem: Skin Injury Risk Increased  Goal: Skin Health and Integrity  Outcome: Progressing  Intervention: Optimize Skin Protection  Flowsheets  Taken 5/21/2025 1300 by Kimmy Richardson, PCT  Activity Management: activity minimized  Head of Bed (HOB) Positioning: HOB elevated  Taken 5/21/2025 0732 by Roxy Flowers RN  Activity Management: activity minimized  Pressure Reduction Techniques:   frequent weight shift encouraged   heels elevated off bed   positioned off wounds   pressure points protected   rest period provided between sit times  Head of Bed (HOB) Positioning: HOB elevated  Pressure Reduction Devices:   specialty bed utilized   pressure-redistributing mattress utilized   positioning supports utilized   heel offloading device utilized  Skin Protection:   transparent dressing maintained   skin sealant/moisture barrier applied     Problem: Comorbidity Management  Goal: Maintenance of Heart Failure Symptom Control  Outcome: Progressing  Intervention: Maintain Heart Failure Management  Flowsheets (Taken 5/20/2025 2000 by Radha Trinh, RN)  Medication  Review/Management: medications reviewed  Goal: Blood Pressure in Desired Range  Outcome: Progressing  Intervention: Maintain Blood Pressure Management  Flowsheets (Taken 5/20/2025 2000 by Radha Trinh, RN)  Medication Review/Management: medications reviewed     Problem: Fall Injury Risk  Goal: Absence of Fall and Fall-Related Injury  Outcome: Progressing  Intervention: Identify and Manage Contributors  Flowsheets  Taken 5/21/2025 0732 by Roxy Flowers RN  Self-Care Promotion:   independence encouraged   BADL personal objects within reach   BADL personal routines maintained  Taken 5/20/2025 2000 by Radha Trinh, RN  Medication Review/Management: medications reviewed  Intervention: Promote Injury-Free Environment  Flowsheets  Taken 5/21/2025 1621  Safety Promotion/Fall Prevention:   activity supervised   assistive device/personal items within reach   clutter free environment maintained   lighting adjusted  Taken 5/21/2025 0732  Safety Promotion/Fall Prevention:   safety round/check completed   nonskid shoes/slippers when out of bed     Problem: Cardiac Catheterization (Diagnostic/Interventional)  Goal: Absence of Bleeding  Outcome: Progressing  Goal: Absence of Contrast-Induced Injury  Outcome: Progressing  Goal: Stable Heart Rate and Rhythm  Outcome: Progressing  Goal: Absence of Embolism Signs and Symptoms  Outcome: Progressing  Intervention: Prevent or Manage Embolism  Recent Flowsheet Documentation  Taken 5/21/2025 1621 by Roxy Flowers RN  VTE Prevention/Management: patient refused intervention  Goal: Anesthesia/Sedation Recovery  Outcome: Progressing  Intervention: Optimize Anesthesia Recovery  Recent Flowsheet Documentation  Taken 5/21/2025 1621 by Roxy Flowers RN  Safety Promotion/Fall Prevention:   activity supervised   assistive device/personal items within reach   clutter free environment maintained   lighting adjusted  Taken 5/21/2025 0732 by Roxy Flowers, RN  Safety Promotion/Fall  Prevention:   safety round/check completed   nonskid shoes/slippers when out of bed  Goal: Optimal Pain Control and Function  Outcome: Progressing  Intervention: Prevent or Manage Pain  Recent Flowsheet Documentation  Taken 5/21/2025 0732 by Roxy Flowers RN  Diversional Activities: television  Goal: Absence of Vascular Access Complication  Outcome: Progressing  Intervention: Prevent and Manage Access Complications  Flowsheets  Taken 5/21/2025 1621 by Roxy Flowers RN  Bleeding Precautions: blood pressure closely monitored  Taken 5/21/2025 1300 by Kimmy Richardson, PCT  Activity Management: activity minimized  Head of Bed (HOB) Positioning: HOB elevated  Taken 5/21/2025 0732 by Roxy Flowers RN  Activity Management: activity minimized  Head of Bed (HOB) Positioning: HOB elevated

## 2025-05-21 NOTE — PROGRESS NOTES
Pt. Referred for Phase II Cardiac Rehab. Staff discussed benefits of exercise, program protocol, and educational material provided. Teach back verified.  Permission granted from patient for staff to fax referral information to outlying program at this time.  Staff faxed referral info to [facility].

## 2025-05-21 NOTE — PROGRESS NOTES
Cardiology Progress Note      Reason for visit:    NSTEMI/unstable angina  Status post PCI    IDENTIFICATION: 84-year-old gentleman who resides in [place], [place]    Active Hospital Problems    Diagnosis  POA    **NSTEMI (non-ST elevated myocardial infarction) [I21.4]  Yes     Priority: High    Heart failure with preserved left ventricular function (HFpEF) [I50.30]  Yes     Priority: High     Echo (12/22/2024): LVEF 53%      Coronary artery disease involving native coronary artery of native heart with angina pectoris [I25.119]  Yes     Priority: High     CABG in the 1980s  PCI 2006 - 2007  Cardiac catheterization (9/6/2024): Occluded native circulation.  Patent LIMA to LAD.  Severely degenerated SVG to RCA and SVG to OM  Cardiac catheterization (9/16/2024): Successful high risk PCI of degenerated sequential SVG to RPDA/RPL--proximal and distal segments--using large-caliber drug-eluting stents.  Residual stenosis of vein graft supplying RPL not treated. Successful high risk PCI of degenerated SVG to OM with large caliber YAMILETH  Cardiac catheterization for NSTEMI (1/27/2025): Severe in-stent restenosis of SVG to RCA status post cutting balloon angioplasty alone.  SVG to OM stent patent.  Staged laser atherectomy and drug-coated balloon angioplasty of SVG to RCA, 2/17/2025      Essential hypertension [I10]  Yes     Priority: Medium     Target blood pressure <130/80 mmHg      Hyperlipidemia LDL goal <70 [E78.5]  Yes     Priority: Low            Patient underwent cardiac catheterization with Dr Parviz Koenig.  There was a 90% Denovo mid LAD stenosis that was treated with drug-eluting stent placement.  There was an 80% in-stent restenosis of the SVG to the RPDA and RPLS for which future staged intervention was recommended.  He has just completed his echocardiogram.  He denies any further chest pain.             Vital Sign Min/Max for last 24 hours  Temp  Min: 94.8 °F (34.9 °C)  Max: 98.1 °F (36.7 °C)   BP  Min: 107/66   Max: 154/90   Pulse  Min: 43  Max: 82   Resp  Min: 16  Max: 18   SpO2  Min: 84 %  Max: 99 %   Flow (L/min) (Oxygen Therapy)  Min: 2  Max: 2    No intake or output data in the 24 hours ending 05/21/25 0848        Physical Exam  Constitutional:       General: He is awake.   Cardiovascular:      Rate and Rhythm: Normal rate and regular rhythm.   Pulmonary:      Effort: Pulmonary effort is normal.      Breath sounds: Normal breath sounds.   Skin:     General: Skin is warm and dry.   Neurological:      Mental Status: He is alert and oriented to person, place, and time.   Psychiatric:         Behavior: Behavior is cooperative.         Tele: Normal sinus rhythm    Results Review (reviewed the patient's recent labs in the electronic medical record):     EKG (5/20/2025): Sinus rhythm with first-degree AV block.  T wave abnormality anterior lateral leads    Echo (12/22/2024): LVEF 51-55% with grade 2 diastolic dysfunction.    Results from last 7 days   Lab Units 05/21/25  0425 05/20/25  1839 05/20/25  1042 05/19/25  1512   SODIUM mmol/L 139  --  136 141   POTASSIUM mmol/L 3.8 4.4 3.5 4.3   CHLORIDE mmol/L 103  --  102 104   BUN mg/dL 12  --  15 13   CREATININE mg/dL 0.78  --  0.80 0.93   MAGNESIUM mg/dL 2.4  --  1.8 1.9       Results from last 7 days   Lab Units 05/19/25  1719 05/19/25  1512   HSTROP T ng/L 32* 32*       Results from last 7 days   Lab Units 05/19/25  1512   WBC 10*3/mm3 7.42   HEMOGLOBIN g/dL 12.9*   HEMATOCRIT % 41.4   PLATELETS 10*3/mm3 180       Lab Results   Component Value Date    HGBA1C 5.10 05/19/2025       Lab Results   Component Value Date    CHOL 115 05/19/2025    CHLPL 138 06/15/2020    TRIG 80 05/19/2025    HDL 33 (L) 05/19/2025    LDL 66 05/19/2025                NSTEMI/unstable angina  Transferred from outside facility 5/19/2025 with chest pain and elevated troponin  Status post cath 5/20/2025 with PCI to LAD  Potential staged PCI of in-stent restenosis of SVG to RCA   DAPT with aspirin and  Plavix  Ranexa 500 mg twice daily      Chronic diastolic heart failure  Last echo normal LVEF with grade 2 diastolic dysfunction  Currently appears euvolemic    Hypertension  Losartan 25 mg daily  Current /60        Hyperlipidemia  Lipitor 20 mg daily           Will review echocardiogram once available  Keep n.p.o. for staged PCI of 80% restenosis of SVG to RCA to be performed today.    Continue DAPT with aspirin and Plavix    Electronically signed by ABDIAZIZ Cruz, 05/21/25, 8:48 AM EDT.

## 2025-05-22 LAB
QT INTERVAL: 454 MS
QTC INTERVAL: 530 MS

## 2025-05-22 NOTE — DISCHARGE SUMMARY
Discharge Summary  [place], [place]    Patient Name: Albin Andino  : 1940  MRN: [id number]    Date of Admission: 2025  Date of Discharge:  2025    IDENTIFICATION:  Albin Andino is a 84 y.o. male who lives in [facility] Problems    Diagnosis  POA    **Coronary artery disease involving native coronary artery of native heart with unstable angina pectoris [I25.110]  Yes     Priority: High     CABG in the 1980s  PCI  -   Cardiac catheterization (2024): Occluded native circulation.  Patent LIMA to LAD.  Severely degenerated SVG to RCA and SVG to OM  Cardiac catheterization (2024): Successful high risk PCI of degenerated sequential SVG to RPDA/RPL--proximal and distal segments--using large-caliber drug-eluting stents.  Residual stenosis of vein graft supplying RPL not treated. Successful high risk PCI of degenerated SVG to OM with large caliber YAMILETH  Cardiac catheterization for NSTEMI (2025): Severe in-stent restenosis of SVG to RCA status post cutting balloon angioplasty alone.  SVG to OM stent patent.  Staged laser atherectomy and drug-coated balloon angioplasty of SVG to RCA, 2025  Cardiac catheterization for unstable angina (2025): 90% de keli mid LAD stenosis status post YAMILETH x 1.  90% recurrent ISR of SVG to RPDA and RPL.  Patent previously stented SVG to OM1  Staged intravascular lithotripsy and drug-coated balloon angioplasty of SVG to RPDA/RPL, 2025      Essential hypertension [I10]  Yes     Target blood pressure <130/80 mmHg      Hyperlipidemia LDL goal <70 [E78.5]  Yes       Summary of Hospitalization:  84-year-old gentleman well-known to our service for history of coronary artery disease, previous bypass who has recently been experiencing recurrent episodes of progressive angina with class IV symptoms.  Last , the patient underwent PCI of SVG to RPDA and SVG to OM using YAMILETH.  He had recurrent  stable angina and was found to have restenosis of the SVG to RPDA stent.  This was treated with drug-coated balloon angioplasty in February 2025.    The patient developed recurrent unstable angina and initially presented to [facility].  He ruled in for myocardial infarction and was transferred to [facility] for evaluation.  Cardiac catheterization was performed on 5/21/2025.  At that time, he underwent PCI of a previously underrecognized stenosis in the LAD distal to the LIMA anastomosis.  Additionally, he had recurrent ISR of the SVG to RPDA.  He came back in staged fashion and underwent intravascular lithotripsy and drug-coated balloon angioplasty of this lesion.    Patient tolerated both procedures well and was discharged home.  He was noted to be bradycardic during hospitalization and metoprolol was discontinued and replaced with ranolazine.  He will remain on long-acting nitrate therapy.  Patient should remain on DAPT and statin therapy.    Procedures Performed  PCI with YAMILETH placement to LAD distal to the LIMA anastomosis  PCI of recurrent ISR of SVG to RPDA/RPL utilizing intravascular lithotripsy and drug-coated balloon angioplasty           Pertinent Test Results:  Results from last 7 days   Lab Units 05/21/25  1304 05/21/25  0425 05/20/25  1839 05/20/25  1042 05/19/25  1512   WBC 10*3/mm3  --   --   --   --  7.42   HEMOGLOBIN g/dL  --   --   --   --  12.9*   HEMATOCRIT %  --   --   --   --  41.4   PLATELETS 10*3/mm3  --   --   --   --  180   SODIUM mmol/L  --  139  --  136 141   POTASSIUM mmol/L 4.4 3.8 4.4 3.5 4.3   CHLORIDE mmol/L  --  103  --  102 104   CO2 mmol/L  --  24.0  --  24.0 27.0   BUN mg/dL  --  12  --  15 13   CREATININE mg/dL  --  0.78  --  0.80 0.93   GLUCOSE mg/dL  --  85  --  128* 96   CALCIUM mg/dL  --  9.0  --  9.1 9.3     Results from last 7 days   Lab Units 05/19/25  1512   BILIRUBIN mg/dL 1.6*   ALK PHOS U/L 99   ALT (SGPT) U/L 15   AST (SGOT) U/L 23      Results from last  7 days   Lab Units 05/19/25  1512   CHOLESTEROL mg/dL 115   TRIGLYCERIDES mg/dL 80   HDL CHOL mg/dL 33*   LDL CHOL mg/dL 66     Results from last 7 days   Lab Units 05/19/25  1512   HEMOGLOBIN A1C % 5.10                Results for orders placed during the hospital encounter of 05/19/25    Adult Transthoracic Echo Complete W/ Cont if Necessary Per Protocol    Interpretation Summary    Left ventricular systolic function is normal. Estimated left ventricular EF = 55%    The left atrial cavity is moderately dilated.    Mild to moderate mitral regurgitation is present.    Estimated right ventricular systolic pressure from tricuspid regurgitation is normal (<35 mmHg).        Physical Exam at Discharge    Vital Signs  Temp:  [95 °F (35 °C)-97.8 °F (36.6 °C)] 97.8 °F (36.6 °C)  Heart Rate:  [75-82] 75  Resp:  [12-16] 12  BP: (116-164)/(69-93) 134/76    Constitutional:       Appearance: Healthy appearance.   Eyes:      General: No scleral icterus.  Neck:      Thyroid: No thyroid mass.      Vascular: No carotid bruit or JVD. JVD normal.   Pulmonary:      Effort: Pulmonary effort is normal.      Breath sounds: Normal breath sounds.   Cardiovascular:      Normal rate. Regular rhythm.      Murmurs: There is no murmur.      No gallop.    Edema:     Peripheral edema absent.   Skin:     General: Skin is warm. There is no cyanosis.   Neurological:      General: No focal deficit present.      Mental Status: Alert.   Psychiatric:         Attention and Perception: Attention normal.          Discharge Disposition  Home or Self Care         Discharge Medications        New Medications        Instructions Start Date   ranolazine 500 MG 12 hr tablet  Commonly known as: RANEXA   500 mg, Oral, 2 Times Daily             Continue These Medications        Instructions Start Date   aspirin 81 MG EC tablet   81 mg, Oral, Daily      atorvastatin 20 MG tablet  Commonly known as: LIPITOR   20 mg, Oral, Nightly      cholecalciferol 25 MCG (1000 UT)  tablet  Commonly known as: VITAMIN D3   1,000 Units, 2 Times Daily      clopidogrel 75 MG tablet  Commonly known as: PLAVIX   75 mg, Oral, Daily      cyanocobalamin 500 MCG tablet  Commonly known as: VITAMIN B-12   500 mcg, Daily      folic acid 400 MCG tablet  Commonly known as: FOLVITE   400 mcg, Daily      gabapentin 600 MG tablet  Commonly known as: NEURONTIN   1,200 mg, Oral, Every Evening      ICaps Areds 2 capsule   1 capsule, 2 Times Daily      isosorbide mononitrate 60 MG 24 hr tablet  Commonly known as: IMDUR   60 mg, Oral, Daily      Linzess 72 MCG capsule capsule  Generic drug: linaclotide   1 capsule, As Needed      losartan 25 MG tablet  Commonly known as: COZAAR   25 mg, Oral, Daily      nitroglycerin 0.4 MG SL tablet  Commonly known as: NITROSTAT   0.4 mg, Oral, Every 5 Minutes PRN      vitamin B-6 100 MG tablet  Commonly known as: PYRIDOXINE   100 mg, Daily      Vitamin C 500 MG capsule   500 mg, Daily             Stop These Medications      metoprolol succinate XL 50 MG 24 hr tablet  Commonly known as: TOPROL-XL     pantoprazole 40 MG EC tablet  Commonly known as: PROTONIX                Diet Instructions       Diet: Cardiac Diets; Healthy Heart (2-3 Na+); Regular (IDDSI 7); Thin (IDDSI 0)      Discharge Diet: Cardiac Diets    Cardiac Diet: Healthy Heart (2-3 Na+)    Texture: Regular (IDDSI 7)    Fluid Consistency: Thin (IDDSI 0)            Activity at Discharge: Ad fabrizio.    Future Appointments   Date Time Provider Department Center   5/28/2025  9:45 AM Inessa Dominguez APRN [facility]   3/27/2026  2:45 PM Niels Cruz IV, MD E [facility]     Additional Instructions for the Follow-ups that You Need to Schedule       Ambulatory Referral to Cardiac Rehab   As directed                   GRACE Cruz MD [facility], Psychiatric  Interventional and General Cardiology      Time: Discharge 25 min

## 2025-05-22 NOTE — OUTREACH NOTE
Prep Survey      Flowsheet Row Responses   [facility] facility patient discharged from? [facility]   Is LACE score < 7 ? No   Eligibility Readm Mgmt   Discharge diagnosis NSTEMI (non-ST elevated myocardial infarction) (I21.4)   Does the patient have one of the following disease processes/diagnoses(primary or secondary)? Acute MI (STEMI,NSTEMI)   Does the patient have Home health ordered? No   Is there a DME ordered? No   Prep survey completed? Yes            MARLIN VASQUEZ - Registered Nurse

## 2025-05-28 ENCOUNTER — READMISSION MANAGEMENT (OUTPATIENT)
Dept: CALL CENTER | Facility: HOSPITAL | Age: 85
End: 2025-05-28
Payer: MEDICARE

## 2025-05-28 ENCOUNTER — OFFICE VISIT (OUTPATIENT)
Dept: CARDIOLOGY | Facility: HOSPITAL | Age: 85
End: 2025-05-28
Payer: MEDICARE

## 2025-05-28 VITALS
DIASTOLIC BLOOD PRESSURE: 60 MMHG | WEIGHT: 168.44 LBS | BODY MASS INDEX: 22.32 KG/M2 | HEIGHT: 73 IN | OXYGEN SATURATION: 96 % | RESPIRATION RATE: 16 BRPM | SYSTOLIC BLOOD PRESSURE: 116 MMHG | HEART RATE: 61 BPM

## 2025-05-28 DIAGNOSIS — E78.5 HYPERLIPIDEMIA LDL GOAL <70: ICD-10-CM

## 2025-05-28 DIAGNOSIS — I25.10 CORONARY ARTERY DISEASE INVOLVING NATIVE CORONARY ARTERY OF NATIVE HEART WITHOUT ANGINA PECTORIS: Primary | ICD-10-CM

## 2025-05-28 DIAGNOSIS — I10 ESSENTIAL HYPERTENSION: ICD-10-CM

## 2025-05-28 RX ORDER — ATORVASTATIN CALCIUM 20 MG/1
20 TABLET, FILM COATED ORAL NIGHTLY
Qty: 90 TABLET | Refills: 3 | Status: SHIPPED | OUTPATIENT
Start: 2025-05-28

## 2025-05-28 NOTE — PROGRESS NOTES
"Chief Complaint  Establish Care, Hypertension, and Coronary Artery Disease    Subjective    History of Present Illness {  Problem List  Visit  Diagnosis   Encounters  Notes  Medications  Labs  Result Review Imaging  Media :23}       History of Present Illness   84-year-old male presents the office today for ongoing evaluation of his CAD. Patient underwent heart cath per Dr. Koenig 5/20/2025 due to unstable angina: 90% de keli mid LAD stenosis and is status post YAMILETH x 1, 80% proximal SVG in-stent restenosis in the sequential SVG to RPDA and RPL S.  Patent LIMA to a diseased LAD.  Patent sequential SVG to diagonal and OM branch. Patient was taken back to the Cath Lab with Dr. Cruz on 5/22/2025 and underwent successful staged PCI of the SVG to the RPDA restenosis utilizing intravascular lithotripsy and drug-coated balloon angioplasty.  He reports he is feeling well currently denies chest pain, dyspnea, palpitations, presyncope or syncope.  During recent hospital stay patient was experiencing bradycardia and  metoprolol was stopped and Ranexa was initiated.  Plans to do cardiac rehab in [place].  Objective     Vital Signs:   Vitals:    05/28/25 1000 05/28/25 1002   BP: 125/85 116/60   BP Location: Left arm Left arm   Patient Position: Standing Sitting   Cuff Size: Adult Adult   Pulse: 75 61   Resp:  16   SpO2: 96% 96%   Weight:  76.4 kg (168 lb 7 oz)   Height:  185.4 cm (72.99\")     Body mass index is 22.23 kg/m².  Physical Exam  Vitals and nursing note reviewed.   Constitutional:       Appearance: Normal appearance.   HENT:      Head: Normocephalic.   Eyes:      Pupils: Pupils are equal, round, and reactive to light.   Cardiovascular:      Rate and Rhythm: Normal rate and regular rhythm.      Pulses: Normal pulses.      Heart sounds: Normal heart sounds. No murmur heard.  Pulmonary:      Effort: Pulmonary effort is normal.      Breath sounds: Normal breath sounds.   Abdominal:      General: Bowel " sounds are normal.      Palpations: Abdomen is soft.   Musculoskeletal:         General: Normal range of motion.      Cervical back: Normal range of motion.      Right lower leg: No edema.      Left lower leg: No edema.   Skin:     General: Skin is warm and dry.      Capillary Refill: Capillary refill takes less than 2 seconds.      Comments: Bilateral radial sites clear without ecchymosis, tenderness   Neurological:      Mental Status: He is alert and oriented to person, place, and time.   Psychiatric:         Mood and Affect: Mood normal.         Thought Content: Thought content normal.              Result Review  Data Reviewed:{ Labs  Result Review  Imaging  Med Tab  Media :23}     Cardiac Catheterization/Vascular Study (05/20/2025 15:05)  Adult Transthoracic Echo Complete W/ Cont if Necessary Per Protocol (05/21/2025 10:04)  Cardiac Catheterization/Vascular Study (05/21/2025 15:07)  Optical Coherence Tomography (05/21/2025 15:07)         Assessment and Plan {CC Problem List  Visit Diagnosis  ROS  Review (Popup)  Health Maintenance  Quality  BestPractice  Medications  SmartSets  SnapShot Encounters  Media :23}   1. Coronary artery disease involving native coronary artery of native heart without angina pectoris  Currently without angina  Stable on aspirin, Lipitor, Plavix, Ranexa, imdur  Metoprolol stopped during recent stay due to bradycardia    2. Essential hypertension  Stable on losartan     3. Hyperlipidemia LDL goal <70    - atorvastatin (LIPITOR) 20 MG tablet; Take 1 tablet by mouth Every Night.  Dispense: 90 tablet; Refill: 3          Follow Up {Instructions Charge Capture  Follow-up Communications :23}   Return if symptoms worsen or fail to improve.    Patient was given instructions and counseling regarding his condition or for health maintenance advice. Please see specific information pulled into the AVS if appropriate.  Patient was instructed to call the [facility] with  any questions, concerns, or worsening symptoms.

## 2025-05-28 NOTE — OUTREACH NOTE
AMI Week 1 Survey      Flowsheet Row Responses   [facility] patient discharged from? [facility]   Does the patient have one of the following disease processes/diagnoses(primary or secondary)? Acute MI (STEMI,NSTEMI)   Week 1 attempt successful? Yes   Call start time 1623   Call end time 1625   Discharge diagnosis NSTEMI (non-ST elevated myocardial infarction) (I21.4)   Meds reviewed with patient/caregiver? Yes   Is the patient having any side effects they believe may be caused by any medication additions or changes? No   Does the patient have all prescriptions related to this admission filled (includes statins,anticoagulants,HTN meds,anti-arrhythmia meds) Yes   Is the patient taking all medications as directed (includes completed medication regime)? Yes   Does the patient have a primary care provider?  Yes   Does the patient have an appointment with their PCP,cardiologist,or clinic within 7 days of discharge? Yes   Has the patient kept scheduled appointments due by today? Yes   Has home health visited the patient within 72 hours of discharge? N/A   Psychosocial issues? No   Did the patient receive a copy of their discharge instructions? Yes   Nursing interventions Reviewed instructions with patient   What is the patient's perception of their health status since discharge? Improving   Nursing interventions Nurse provided patient education   Is the patient/caregiver able to teach back signs and symptoms of when to call for help immediately: Irregular or rapid heart rate, Dizziness or lightheadedness, Nausea or vomiting, Sudden sweating or clammy skin, Shortness of breath at any time, Sudden discomfort in arms, back, neck or jaw, Sudden chest discomfort   Is the pateint /caregiver able to teach back the importance of cardiac rehab? No   Is the patient/caregiver able to teach back ways to prevent a second heart attack: Take medications, Follow up with MD   Is the patient/caregiver able to teach back the hierarchy  of who to call/visit for symptoms/problems? PCP, Specialist, Home health nurse, Urgent Care, ED, 911 Yes   Week 1 call completed? Yes   Graduated/Revoked comments Wife reports Pt is doing very well and saw his cardio Dr today   Call end time [id number]            ALMITA JORDAN - Registered Nurse

## 2025-06-05 ENCOUNTER — TELEPHONE (OUTPATIENT)
Dept: CARDIOLOGY | Facility: CLINIC | Age: 85
End: 2025-06-05

## 2025-06-05 NOTE — TELEPHONE ENCOUNTER
Caller: Desmond Dunaway    Relationship: Emergency Contact    Best call back number: [phone]    What form or medical record are you requesting: ORDER FOR CARDIAC REHAB    Who is requesting this form or medical record from you: DESMOND DUNAWAY    How would you like to receive the form or medical records (pick-up, mail, fax): FAX  If fax, what is the fax number: [phone] ATTN: GAYLE      Timeframe paperwork needed: ASAP    Additional notes: REHAB CENTER NAME IS [facility]

## 2025-06-18 ENCOUNTER — TELEPHONE (OUTPATIENT)
Dept: CARDIOLOGY | Facility: CLINIC | Age: 85
End: 2025-06-18
Payer: MEDICARE

## 2025-06-18 NOTE — TELEPHONE ENCOUNTER
Patient's daughter called asking for a cardiac rehab order sent to [facility]. I spoke with Bella at cardiac rehab there. Faxed to [phone]. I spoke with his daughter as well and she is aware. He is scheduled to start tomorrow.

## 2025-07-30 ENCOUNTER — TELEPHONE (OUTPATIENT)
Dept: CARDIOLOGY | Facility: CLINIC | Age: 85
End: 2025-07-30
Payer: MEDICARE

## 2025-07-30 NOTE — TELEPHONE ENCOUNTER
Karoline at PCP office called to report that he was at cardiac rehab in [place] today and reported 's. They asked him to go to the ER, which he declined and went to PCP office. Office states that his BP was 173/89, HR 92. She has a rhythm strip but doesn't think they did an EKG. She will fax what was sent to her. Reports dizziness with standing. Denies chest pain or shortness of breath. Intolerant to beta blockers in the past. ABDIAZIZ Wilson has agreed to see the patient and will have her office reach out to schedule him.

## 2025-07-31 ENCOUNTER — OFFICE VISIT (OUTPATIENT)
Dept: CARDIOLOGY | Facility: HOSPITAL | Age: 85
End: 2025-07-31
Payer: MEDICARE

## 2025-07-31 ENCOUNTER — HOSPITAL ENCOUNTER (OUTPATIENT)
Dept: CARDIOLOGY | Facility: HOSPITAL | Age: 85
Discharge: HOME | End: 2025-07-31
Payer: MEDICARE

## 2025-07-31 VITALS
WEIGHT: 175.4 LBS | OXYGEN SATURATION: 98 % | DIASTOLIC BLOOD PRESSURE: 78 MMHG | HEART RATE: 82 BPM | HEIGHT: 73 IN | SYSTOLIC BLOOD PRESSURE: 134 MMHG | RESPIRATION RATE: 18 BRPM | BODY MASS INDEX: 23.25 KG/M2

## 2025-07-31 DIAGNOSIS — R00.0 TACHYCARDIA: ICD-10-CM

## 2025-07-31 DIAGNOSIS — I25.10 CORONARY ARTERY DISEASE INVOLVING NATIVE CORONARY ARTERY OF NATIVE HEART WITHOUT ANGINA PECTORIS: ICD-10-CM

## 2025-07-31 DIAGNOSIS — R00.0 TACHYCARDIA: Primary | ICD-10-CM

## 2025-07-31 PROCEDURE — 93005 ELECTROCARDIOGRAM TRACING: CPT | Performed by: NURSE PRACTITIONER

## 2025-07-31 RX ORDER — FINASTERIDE 5 MG/1
5 TABLET, FILM COATED ORAL DAILY
COMMUNITY

## 2025-07-31 RX ORDER — ISOSORBIDE DINITRATE 30 MG/1
30 TABLET ORAL 2 TIMES DAILY
COMMUNITY

## 2025-07-31 RX ORDER — BENAZEPRIL HYDROCHLORIDE 5 MG/1
5 TABLET ORAL DAILY
COMMUNITY

## 2025-07-31 NOTE — PROGRESS NOTES
[facility] Heart Monitor Documentation    Albin Andino  1940  [id number]  07/31/25      [] ZIO XT Patch  Model [id number] Prescribed for  Days    Serial Number: (N + 9 Digits) N   Apply-By Date on Box:   USPS Tracking Number:   USPS Tracking        [x] [employer]ian MINI PLUS Mobile Cardiac Telemetry  Model BGMINIPLUS Prescribed for 14 Days    Serial Number: (BGM + 7 Digits) [id number]  Shipped-By Date on Box: 07/01/2025  UPS Tracking Number: [id number]  UPS Tracking      [] [employer] MINI Holter Monitor  Model BGMINIEL Prescribed for  Days    Serial Number: (7 Digits)   Shipped-By Date on Box:   UPS Tracking Number: [id number]  UPS Tracking        This monitor was applied to the patient's chest and checked for proper functioning.  Mr. Albin Andino was instructed in the proper use of this monitor.  He was given the opportunity to ask questions and left the office with the device 's instruction manual.    Yessi Mehta, [facility], 14:55 EDT, 07/31/25                  [facility]

## 2025-07-31 NOTE — PROGRESS NOTES
Chief Complaint  Rapid Heart Rate    Subjective      History of Present Illness {CC  Problem List  Visit  Diagnosis   Encounters  Notes  Medications  Labs  Result Review Imaging  Media :23}     Albin Starkeyjuvenal Andino, 85 y.o. male presents to [facility] Val[facility] Fibrillation/arrhythmia clinic for Rapid Heart Rate.    Cardiac problem list:   Coronary artery disease involving native coronary artery of native heart with unstable angina pectoris [I25.110]   Yes       Priority: High       CABG in the 1980s  PCI 2006 - 2007  Cardiac catheterization (9/6/2024): Occluded native circulation.  Patent LIMA to LAD.  Severely degenerated SVG to RCA and SVG to OM  Cardiac catheterization (9/16/2024): Successful high risk PCI of degenerated sequential SVG to RPDA/RPL--proximal and distal segments--using large-caliber drug-eluting stents.  Residual stenosis of vein graft supplying RPL not treated. Successful high risk PCI of degenerated SVG to OM with large caliber YAMILETH  Cardiac catheterization for NSTEMI (1/27/2025): Severe in-stent restenosis of SVG to RCA status post cutting balloon angioplasty alone.  SVG to OM stent patent.  Staged laser atherectomy and drug-coated balloon angioplasty of SVG to RCA, 2/17/2025  Cardiac catheterization for unstable angina (5/20/2025): 90% de keli mid LAD stenosis status post YAMILETH x 1.  90% recurrent ISR of SVG to RPDA and RPL.  Patent previously stented SVG to OM1  Staged intravascular lithotripsy and drug-coated balloon angioplasty of SVG to RPDA/RPL, 5/21/2025       Essential hypertension [I10]   Yes       Target blood pressure <130/80 mmHg       Hyperlipidemia LDL goal <70 [E78.5]         HPI:  Patient presents today for evaluation of tachycardia after primary cardiology was contacted yesterday regarding patient having tachycardia at cardiac rehab.  Patient was scheduled in heart valve center for further evaluation.   Recently successful staged PCI of SVG to PDA with   "Anthony 5/22/2025.  Echocardiogram May 2025 with preserved LVEF, mild to moderate MR, LA cavity moderately dilated. ECG today with sinus rhythm with first-degree AVB, RBBB, ventricular rate 79 bpm; appears no significant change from prior ECG.  Cardiac rhythm strips supplied from cardiac rehab appear to show normal sinus rhythm with isolated PVCs.    Presents today feeling well overall from cardiovascular standpoint.  He reports that he was overall unaware of elevated heart rates at cardiac rehab; specifically no palpitation/tachypalpitation. Participating in cardiac rehab three days per week with no exertional chest pain, dyspnea, or palpitations.      Objective     Vital Signs:   Vitals:    07/31/25 1351 07/31/25 1434   BP: 161/75 134/78   BP Location: Left arm    Patient Position: Sitting    Cuff Size: Adult    Pulse: 82    Resp: 18    SpO2: 98%    Weight: 79.6 kg (175 lb 6.4 oz)    Height: 185.4 cm (73\")      Body mass index is 23.14 kg/m².  Physical Exam  Vitals and nursing note reviewed.   Constitutional:       Appearance: Normal appearance.   HENT:      Head: Normocephalic.   Eyes:      Extraocular Movements: Extraocular movements intact.   Neck:      Vascular: No carotid bruit.   Cardiovascular:      Rate and Rhythm: Normal rate and regular rhythm.      Pulses: Normal pulses.      Heart sounds: Normal heart sounds, S1 normal and S2 normal. No murmur heard.  Pulmonary:      Effort: Pulmonary effort is normal. No respiratory distress.      Breath sounds: Normal breath sounds.   Musculoskeletal:      Cervical back: Neck supple.      Right lower leg: No edema.      Left lower leg: No edema.   Skin:     General: Skin is warm and dry.   Neurological:      General: No focal deficit present.      Mental Status: He is alert.   Psychiatric:         Mood and Affect: Mood normal.         Behavior: Behavior normal.         Thought Content: Thought content normal.       Data Reviewed:{ Labs  Result Review  Imaging  " "Med Tab  Media :23}     ECG 12 Lead (07/31/2025 14:02)  Cardiac Catheterization/Vascular Study (05/21/2025 15:07)  Adult Transthoracic Echo Complete W/ Cont if Necessary Per Protocol (05/21/2025 10:04)  TSH, High Sensitivity (02/06/2025 09:40)       Lab Results   Component Value Date    GLUCOSE 85 05/21/2025    CALCIUM 9.0 05/21/2025     05/21/2025    K 4.4 05/21/2025    CO2 24.0 05/21/2025     05/21/2025    BUN 12 05/21/2025    CREATININE 0.78 05/21/2025    EGFR 86.9 05/21/2025    BCR 15.4 05/21/2025    ANIONGAP 12.0 05/21/2025     No results found for: \"TSH\"  Lab Results   Component Value Date    WBC 7.42 05/19/2025    HGB 12.9 (L) 05/19/2025    HCT 41.4 05/19/2025    MCV 86.6 05/19/2025     05/19/2025       Assessment & Plan   Assessment and Plan {CC Problem List  Visit Diagnosis  ROS  Review (Popup)  Health Maintenance  Quality  BestPractice  Medications  SmartSets  SnapShot Encounters  Media :23}     1. Tachycardia  -Recently reported tachycardiac cardiac rehab.  Cardiac rhythm strips that were faxed appear to show normal sinus rhythm with isolated PVCs.  -Patient reports he was overall asymptomatic with tachycardic episode; specifically denies any palpitation/tachypalpitation, lightheadedness/near syncope/syncope.  -ECG today with sinus rhythm with first-degree AVB, RBBB, ventricular rate 79 bpm; appears no significant change from prior ECG.    -Given recent tachycardia episode will complete 14-day MCOT for arrhythmia surveillance.  - Cardiac Event Monitor (CHLOE) or Mobile Cardiac Outpatient Telemetry ([facility]); Future  - Will message cardiology to help arrange for follow-up    2. Coronary artery disease involving native coronary artery of native heart without angina pectoris  -Recently successful staged PCI of SVG to PDA with Dr. Cruz 5/22/2025.  -Echocardiogram May 2025 with preserved LVEF, mild to moderate MR, LA cavity moderately dilated.   - Denies chest pain/anginal " symptoms while participating in cardiac rehab  - Continue DAPT, Imdur, Ranexa, atorvastatin as prescribed      Follow Up {Instructions Charge Capture  Follow-up Communications :23}     Return if symptoms worsen or fail to improve.      Patient was given instructions and counseling regarding his condition or for health maintenance advice. Please see specific information pulled into the AVS if appropriate. Patient was instructed to call the [facility] with any questions, concerns, or worsening symptoms.    Dictated Utilizing Dragon Dictation   Please note that portions of this note were completed with a voice recognition program. Part of this note may be an electronic transcription/translation of spoken language to printed text using the Dragon Dictation System.

## 2025-08-01 LAB
QT INTERVAL: 450 MS
QTC INTERVAL: 516 MS

## 2025-08-26 ENCOUNTER — RESULTS FOLLOW-UP (OUTPATIENT)
Dept: CARDIOLOGY | Facility: HOSPITAL | Age: 85
End: 2025-08-26
Payer: MEDICARE

## 2025-08-26 DIAGNOSIS — R00.0 TACHYCARDIA: Primary | ICD-10-CM

## 2025-08-27 ENCOUNTER — TELEPHONE (OUTPATIENT)
Dept: CARDIOLOGY | Facility: CLINIC | Age: 85
End: 2025-08-27
Payer: MEDICARE

## (undated) DEVICE — CATH BALN INTRAVASC/LITHO C2PLUS 4X12MM

## (undated) DEVICE — CATH DIAG EXPO M/ PK 6FR FL4/FR4 PIG 3PK

## (undated) DEVICE — MODEL BT2000 P/N 700287-012KIT CONTENTS: MANIFOLD WITH SALINE AND CONTRAST PORTS, SALINE TUBING WITH SPIKE AND HAND SYRINGE, TRANSDUCER: Brand: BT2000 AUTOMATED MANIFOLD KIT

## (undated) DEVICE — NC TREK NEO™ CORONARY DILATATION CATHETER 4.50 MM X 20 MM / RAPID-EXCHANGE: Brand: NC TREK NEO™

## (undated) DEVICE — NDL ANGIOGR ADV THN SMOTH SGLWALL 21G 1.5

## (undated) DEVICE — GW PERIPH GUIDERIGHT STD/EXCHNG/J/TIP SS 0.035IN 5X260CM

## (undated) DEVICE — DEV INFL MONARCH 25W

## (undated) DEVICE — CATH DIAG EXPO .045 AR1 5F 100CM

## (undated) DEVICE — PK CATH CARD 10

## (undated) DEVICE — GUIDE CATHETER: Brand: MACH1™

## (undated) DEVICE — CATH DIAG EXPO .045 MPA1 5F 100CM

## (undated) DEVICE — GW PRESSUREWIRE X WIRELESS FFR 175CM

## (undated) DEVICE — CVR PROB ULTRASND/TRANSD W/GEL 7X11IN STRL

## (undated) DEVICE — DEV EPS SPIDERFX 5MM OTW320/RX190CM

## (undated) DEVICE — COPILOT BLEEDBACK CONTROL VALVE: Brand: COPILOT

## (undated) DEVICE — ADULT, W/LG. BACK PAD, RADIOTRANSPARENT ELEMENT AND LEAD WIRE COMPATIBLE W/: Brand: DEFIBRILLATION ELECTRODES

## (undated) DEVICE — LIMB HOLDER, WRIST/ANKLE: Brand: DEROYAL

## (undated) DEVICE — MODEL AT P65, P/N 701554-001KIT CONTENTS: HAND CONTROLLER, 3-WAY HIGH-PRESSURE STOPCOCK WITH ROTATING END AND PREMIUM HIGH-PRESSURE TUBING: Brand: ANGIOTOUCH® KIT

## (undated) DEVICE — PERCLOSE™ PROSTYLE™ SUTURE-MEDIATED CLOSURE AND REPAIR SYSTEM: Brand: PERCLOSE™ PROSTYLE™

## (undated) DEVICE — PACLITAXEL-COATED BALLOON CATHETER: Brand: AGENT™

## (undated) DEVICE — NC TREK NEO™ CORONARY DILATATION CATHETER 5.00 MM X 12 MM / RAPID-EXCHANGE: Brand: NC TREK NEO™

## (undated) DEVICE — TR BAND RADIAL ARTERY COMPRESSION DEVICE: Brand: TR BAND

## (undated) DEVICE — ST ACC MICROPUNCTURE .018 TRANSLSS/SS/TP 5F/10CM 21G/7CM

## (undated) DEVICE — NC TREK NEO™ CORONARY DILATATION CATHETER 4.00 MM X 20 MM / RAPID-EXCHANGE: Brand: NC TREK NEO™

## (undated) DEVICE — MINI TREK CORONARY DILATATION CATHETER 1.50 MM X 20 MM / RAPID-EXCHANGE: Brand: MINI TREK

## (undated) DEVICE — PINNACLE INTRODUCER SHEATH: Brand: PINNACLE

## (undated) DEVICE — TREK CORONARY DILATATION CATHETER 3.0 MM X 15 MM / RAPID-EXCHANGE: Brand: TREK

## (undated) DEVICE — ST INF PRI SMRTSTE 20DRP 2VLV 24ML 117

## (undated) DEVICE — NC TREK NEO™ CORONARY DILATATION CATHETER 3.00 MM X 12 MM / RAPID-EXCHANGE: Brand: NC TREK NEO™

## (undated) DEVICE — GLIDESHEATH SLENDER STAINLESS STEEL KIT: Brand: GLIDESHEATH SLENDER

## (undated) DEVICE — ST EXT IV SMRTSTE 2VLV FIX M LL 6ML 41

## (undated) DEVICE — SYS CLS VASC/VENI VASCADE MVP 6TO12F

## (undated) DEVICE — Device: Brand: ASAHI SION BLUE

## (undated) DEVICE — GLIDESHEATH BASIC HYDROPHILIC COATED INTRODUCER SHEATH: Brand: GLIDESHEATH

## (undated) DEVICE — EMBOLIC PROTECTION SYSTEM: Brand: FILTERWIRE EZ™

## (undated) DEVICE — GUIDELINER CATHETERS ARE INTENDED TO BE USED IN CONJUNCTION WITH GUIDE CATHETERS TO ACCESS DISCRETE REGIONS OF THE CORONARY AND/OR PERIPHERAL VASCULATURE, AND TO FACILITATE PLACEMENT OF INTERVENTIONAL DEVICES.: Brand: GUIDELINER® V3 CATHETER

## (undated) DEVICE — KT CATH IMG DRAGONFLY/OPSTAR 2.7F 135CM

## (undated) DEVICE — STPCK 3/WY HP M/RA W/OFF/HNDL 1050PSI STRL

## (undated) DEVICE — CATH IMG IVUS EAGLE EYE PLATIN RX DIGITAL .014IN 5FR

## (undated) DEVICE — CATH DIAG EXPO .045 FL3.5 5F 100CM

## (undated) DEVICE — MICROSURGICAL DILATATION DEVICE: Brand: WOLVERINE CORONARY CUTTING BALLOON

## (undated) DEVICE — KT VLV HEMO MAP ACC PLS LG/BORE MTL/INTRO W/TORQ/DEV

## (undated) DEVICE — CATH DIAG EXPO M/ PK 5F FL4/FR4 PIG

## (undated) DEVICE — CATH DIAG EXPO .045 AL1 5F 100CM

## (undated) DEVICE — MINI TREK CORONARY DILATATION CATHETER 2.0 MM X 30 MM / RAPID-EXCHANGE: Brand: MINI TREK

## (undated) DEVICE — TREK CORONARY DILATATION CATHETER 3.0 MM X 30 MM / RAPID-EXCHANGE: Brand: TREK

## (undated) DEVICE — NC TREK NEO™ CORONARY DILATATION CATHETER 2.50 X 15 MM / RAPID-EXCHANGE: Brand: NC TREK NEO™

## (undated) DEVICE — TREK CORONARY DILATATION CATHETER 2.50 MM X 30 MM / RAPID-EXCHANGE: Brand: TREK

## (undated) DEVICE — Device: Brand: ELCA CORONARY LASER ATHERECTOMY CATHETER

## (undated) DEVICE — CATH DIAG EXPO .056 FL3.5 6F 100CM

## (undated) DEVICE — GW PERIPH VASC ADX J/TP SS .035 150CM 3MM

## (undated) DEVICE — ANGIOGRAPHIC CATHETER: Brand: EXPO™

## (undated) DEVICE — TREK CORONARY DILATATION CATHETER 4.50 MM X 15 MM / RAPID-EXCHANGE: Brand: TREK